# Patient Record
Sex: FEMALE | Race: WHITE | NOT HISPANIC OR LATINO
[De-identification: names, ages, dates, MRNs, and addresses within clinical notes are randomized per-mention and may not be internally consistent; named-entity substitution may affect disease eponyms.]

---

## 2017-01-31 ENCOUNTER — APPOINTMENT (OUTPATIENT)
Dept: DERMATOLOGY | Facility: CLINIC | Age: 82
End: 2017-01-31

## 2018-05-21 ENCOUNTER — EMERGENCY (EMERGENCY)
Facility: HOSPITAL | Age: 83
LOS: 1 days | Discharge: HOME | End: 2018-05-21
Attending: STUDENT IN AN ORGANIZED HEALTH CARE EDUCATION/TRAINING PROGRAM
Payer: MEDICARE

## 2018-05-21 VITALS
HEART RATE: 81 BPM | TEMPERATURE: 98 F | RESPIRATION RATE: 20 BRPM | DIASTOLIC BLOOD PRESSURE: 72 MMHG | HEIGHT: 66 IN | SYSTOLIC BLOOD PRESSURE: 110 MMHG | OXYGEN SATURATION: 97 % | WEIGHT: 156.97 LBS

## 2018-05-21 VITALS
OXYGEN SATURATION: 99 % | HEART RATE: 80 BPM | SYSTOLIC BLOOD PRESSURE: 112 MMHG | RESPIRATION RATE: 16 BRPM | DIASTOLIC BLOOD PRESSURE: 71 MMHG | TEMPERATURE: 98 F

## 2018-05-21 DIAGNOSIS — Z95.0 PRESENCE OF CARDIAC PACEMAKER: Chronic | ICD-10-CM

## 2018-05-21 LAB
ALBUMIN SERPL ELPH-MCNC: 3 G/DL — LOW (ref 3.3–5.2)
ALP SERPL-CCNC: 63 U/L — SIGNIFICANT CHANGE UP (ref 40–120)
ALT FLD-CCNC: 11 U/L — SIGNIFICANT CHANGE UP
ANION GAP SERPL CALC-SCNC: 14 MMOL/L — SIGNIFICANT CHANGE UP (ref 5–17)
AST SERPL-CCNC: 33 U/L — HIGH
BASOPHILS # BLD AUTO: 0 K/UL — SIGNIFICANT CHANGE UP (ref 0–0.2)
BASOPHILS NFR BLD AUTO: 0.3 % — SIGNIFICANT CHANGE UP (ref 0–2)
BILIRUB SERPL-MCNC: 0.6 MG/DL — SIGNIFICANT CHANGE UP (ref 0.4–2)
BUN SERPL-MCNC: 16 MG/DL — SIGNIFICANT CHANGE UP (ref 8–20)
CALCIUM SERPL-MCNC: 9.2 MG/DL — SIGNIFICANT CHANGE UP (ref 8.6–10.2)
CHLORIDE SERPL-SCNC: 100 MMOL/L — SIGNIFICANT CHANGE UP (ref 98–107)
CK SERPL-CCNC: 55 U/L — SIGNIFICANT CHANGE UP (ref 25–170)
CO2 SERPL-SCNC: 25 MMOL/L — SIGNIFICANT CHANGE UP (ref 22–29)
CREAT SERPL-MCNC: 0.74 MG/DL — SIGNIFICANT CHANGE UP (ref 0.5–1.3)
EOSINOPHIL # BLD AUTO: 0.1 K/UL — SIGNIFICANT CHANGE UP (ref 0–0.5)
EOSINOPHIL NFR BLD AUTO: 0.9 % — SIGNIFICANT CHANGE UP (ref 0–6)
GLUCOSE SERPL-MCNC: 89 MG/DL — SIGNIFICANT CHANGE UP (ref 70–115)
HCT VFR BLD CALC: 42.4 % — SIGNIFICANT CHANGE UP (ref 37–47)
HGB BLD-MCNC: 13.5 G/DL — SIGNIFICANT CHANGE UP (ref 12–16)
LYMPHOCYTES # BLD AUTO: 2.6 K/UL — SIGNIFICANT CHANGE UP (ref 1–4.8)
LYMPHOCYTES # BLD AUTO: 22.4 % — SIGNIFICANT CHANGE UP (ref 20–55)
MCHC RBC-ENTMCNC: 30.1 PG — SIGNIFICANT CHANGE UP (ref 27–31)
MCHC RBC-ENTMCNC: 31.8 G/DL — LOW (ref 32–36)
MCV RBC AUTO: 94.6 FL — SIGNIFICANT CHANGE UP (ref 81–99)
MONOCYTES # BLD AUTO: 1 K/UL — HIGH (ref 0–0.8)
MONOCYTES NFR BLD AUTO: 9.2 % — SIGNIFICANT CHANGE UP (ref 3–10)
NEUTROPHILS # BLD AUTO: 7.7 K/UL — SIGNIFICANT CHANGE UP (ref 1.8–8)
NEUTROPHILS NFR BLD AUTO: 67.1 % — SIGNIFICANT CHANGE UP (ref 37–73)
PLATELET # BLD AUTO: 193 K/UL — SIGNIFICANT CHANGE UP (ref 150–400)
POTASSIUM SERPL-MCNC: 5.4 MMOL/L — HIGH (ref 3.5–5.3)
POTASSIUM SERPL-SCNC: 5.4 MMOL/L — HIGH (ref 3.5–5.3)
PROT SERPL-MCNC: 6.9 G/DL — SIGNIFICANT CHANGE UP (ref 6.6–8.7)
RBC # BLD: 4.48 M/UL — SIGNIFICANT CHANGE UP (ref 4.4–5.2)
RBC # FLD: 14.2 % — SIGNIFICANT CHANGE UP (ref 11–15.6)
SODIUM SERPL-SCNC: 139 MMOL/L — SIGNIFICANT CHANGE UP (ref 135–145)
TROPONIN T SERPL-MCNC: <0.01 NG/ML — SIGNIFICANT CHANGE UP (ref 0–0.06)
WBC # BLD: 11.4 K/UL — HIGH (ref 4.8–10.8)
WBC # FLD AUTO: 11.4 K/UL — HIGH (ref 4.8–10.8)

## 2018-05-21 PROCEDURE — 84484 ASSAY OF TROPONIN QUANT: CPT

## 2018-05-21 PROCEDURE — 93010 ELECTROCARDIOGRAM REPORT: CPT

## 2018-05-21 PROCEDURE — 71046 X-RAY EXAM CHEST 2 VIEWS: CPT

## 2018-05-21 PROCEDURE — 85027 COMPLETE CBC AUTOMATED: CPT

## 2018-05-21 PROCEDURE — 71046 X-RAY EXAM CHEST 2 VIEWS: CPT | Mod: 26

## 2018-05-21 PROCEDURE — 93005 ELECTROCARDIOGRAM TRACING: CPT

## 2018-05-21 PROCEDURE — 36415 COLL VENOUS BLD VENIPUNCTURE: CPT

## 2018-05-21 PROCEDURE — 80053 COMPREHEN METABOLIC PANEL: CPT

## 2018-05-21 PROCEDURE — 82550 ASSAY OF CK (CPK): CPT

## 2018-05-21 PROCEDURE — 99284 EMERGENCY DEPT VISIT MOD MDM: CPT | Mod: 25

## 2018-05-21 PROCEDURE — 99285 EMERGENCY DEPT VISIT HI MDM: CPT

## 2018-05-21 RX ORDER — SODIUM CHLORIDE 9 MG/ML
3 INJECTION INTRAMUSCULAR; INTRAVENOUS; SUBCUTANEOUS ONCE
Qty: 0 | Refills: 0 | Status: COMPLETED | OUTPATIENT
Start: 2018-05-21 | End: 2018-05-21

## 2018-05-21 RX ORDER — LIDOCAINE 4 G/100G
10 CREAM TOPICAL ONCE
Qty: 0 | Refills: 0 | Status: COMPLETED | OUTPATIENT
Start: 2018-05-21 | End: 2018-05-21

## 2018-05-21 RX ADMIN — SODIUM CHLORIDE 3 MILLILITER(S): 9 INJECTION INTRAMUSCULAR; INTRAVENOUS; SUBCUTANEOUS at 20:32

## 2018-05-21 RX ADMIN — Medication 30 MILLILITER(S): at 20:32

## 2018-05-21 RX ADMIN — LIDOCAINE 10 MILLILITER(S): 4 CREAM TOPICAL at 20:32

## 2018-05-21 NOTE — ED PROVIDER NOTE - MEDICAL DECISION MAKING DETAILS
Pt with likely throat discomfort causing chest pain. Due to cardiac risk factors will evaluated for ACS.

## 2018-05-21 NOTE — ED ADULT TRIAGE NOTE - CHIEF COMPLAINT QUOTE
Patient brought in by ambulance A/Ox3, c/o midsternal chest pain that started last night, patient given 4 baby aspirin.

## 2018-05-21 NOTE — ED ADULT NURSE NOTE - OBJECTIVE STATEMENT
Assumed pt care at 1900.  PT awake, alert and oriented x3 c/o chest pain last night, resolved at this time.  Pt describes pain as "sitting in her throat" non radiating.  Denies shortness of breath.  Respirations even and unlabored.  Skin warm and dry, color good.  Moving all extremities well.  No edema.  No signs of acute distress at this time.  Patient placed on cardiac monitor, paced rhythm of 67.

## 2018-05-21 NOTE — ED PROVIDER NOTE - OBJECTIVE STATEMENT
83 y/o F pt with a pmhx of Crohn's disease, cardiac disease, incontinence, chronic back pain, high cholesterol, rheumatoid arthritis and pacemaker presents to the ED c/o chest pain that onset yesterday. Admits to associated chest congestion, difficulty breathing and producing  phlegm. Localizes the pain to the L upper sternal region. Describes a green phlegm that she is coughing up. Difficulty breathing and pain is exacerbated when she takes a deep breath. Denies radiation of pain, improving factors, difficulty speaking, sob, HA, fever, chills, cough, rash, recent travel or any other complaints. Allergic to sulfur drugs.   Cardiologist: Dr. Martinez  PMD: Dr. Stein 83 y/o F pt with a pmhx of Crohn's disease, cardiac disease, incontinence, chronic back pain, high cholesterol, rheumatoid arthritis and pacemaker presents to the ED c/o constant chest pain that onset yesterday. Admits to associated chest congestion, discomfort with deep breathing in her throat and sporadically producing phlegm. Localizes the pain just above the sternum. Describes a green phlegm that she is coughing up. Difficulty breathing and pain is exacerbated when she takes a deep breath. Denies radiation of pain, improving factors, difficulty speaking, sob, HA, fever, chills, cough, rash, recent travel or any other complaints. Allergic to sulfur drugs.   Cardiologist: Dr. Martinez  PMD: Dr. Stein

## 2018-05-21 NOTE — ED ADULT NURSE REASSESSMENT NOTE - NS ED NURSE REASSESS COMMENT FT1
Pt able to ambulate safely and steadily w/out assistance, denies dizziness/weakness upon standing, IV removed, pt d/c home w/ family, d/c by previous RN Lavern.

## 2018-06-16 ENCOUNTER — EMERGENCY (EMERGENCY)
Facility: HOSPITAL | Age: 83
LOS: 1 days | Discharge: HOME | End: 2018-06-16
Attending: EMERGENCY MEDICINE
Payer: MEDICARE

## 2018-06-16 VITALS
HEART RATE: 90 BPM | HEIGHT: 66 IN | WEIGHT: 175.05 LBS | OXYGEN SATURATION: 95 % | SYSTOLIC BLOOD PRESSURE: 138 MMHG | DIASTOLIC BLOOD PRESSURE: 88 MMHG | TEMPERATURE: 98 F | RESPIRATION RATE: 16 BRPM

## 2018-06-16 VITALS
HEART RATE: 86 BPM | TEMPERATURE: 98 F | SYSTOLIC BLOOD PRESSURE: 138 MMHG | DIASTOLIC BLOOD PRESSURE: 81 MMHG | OXYGEN SATURATION: 95 % | RESPIRATION RATE: 18 BRPM

## 2018-06-16 DIAGNOSIS — Z95.0 PRESENCE OF CARDIAC PACEMAKER: Chronic | ICD-10-CM

## 2018-06-16 LAB
ALBUMIN SERPL ELPH-MCNC: 2.9 G/DL — LOW (ref 3.3–5.2)
ALP SERPL-CCNC: 66 U/L — SIGNIFICANT CHANGE UP (ref 40–120)
ALT FLD-CCNC: 12 U/L — SIGNIFICANT CHANGE UP
ANION GAP SERPL CALC-SCNC: 14 MMOL/L — SIGNIFICANT CHANGE UP (ref 5–17)
APTT BLD: 28.2 SEC — SIGNIFICANT CHANGE UP (ref 27.5–37.4)
AST SERPL-CCNC: 31 U/L — SIGNIFICANT CHANGE UP
BILIRUB SERPL-MCNC: 0.5 MG/DL — SIGNIFICANT CHANGE UP (ref 0.4–2)
BUN SERPL-MCNC: 14 MG/DL — SIGNIFICANT CHANGE UP (ref 8–20)
CALCIUM SERPL-MCNC: 8.8 MG/DL — SIGNIFICANT CHANGE UP (ref 8.6–10.2)
CHLORIDE SERPL-SCNC: 100 MMOL/L — SIGNIFICANT CHANGE UP (ref 98–107)
CO2 SERPL-SCNC: 25 MMOL/L — SIGNIFICANT CHANGE UP (ref 22–29)
CREAT SERPL-MCNC: 0.59 MG/DL — SIGNIFICANT CHANGE UP (ref 0.5–1.3)
GLUCOSE SERPL-MCNC: 105 MG/DL — SIGNIFICANT CHANGE UP (ref 70–115)
HCT VFR BLD CALC: 41 % — SIGNIFICANT CHANGE UP (ref 37–47)
HGB BLD-MCNC: 12.8 G/DL — SIGNIFICANT CHANGE UP (ref 12–16)
INR BLD: 1.12 RATIO — SIGNIFICANT CHANGE UP (ref 0.88–1.16)
MCHC RBC-ENTMCNC: 29.8 PG — SIGNIFICANT CHANGE UP (ref 27–31)
MCHC RBC-ENTMCNC: 31.2 G/DL — LOW (ref 32–36)
MCV RBC AUTO: 95.3 FL — SIGNIFICANT CHANGE UP (ref 81–99)
PLATELET # BLD AUTO: 252 K/UL — SIGNIFICANT CHANGE UP (ref 150–400)
POTASSIUM SERPL-MCNC: 4.6 MMOL/L — SIGNIFICANT CHANGE UP (ref 3.5–5.3)
POTASSIUM SERPL-SCNC: 4.6 MMOL/L — SIGNIFICANT CHANGE UP (ref 3.5–5.3)
PROT SERPL-MCNC: 7.1 G/DL — SIGNIFICANT CHANGE UP (ref 6.6–8.7)
PROTHROM AB SERPL-ACNC: 12.3 SEC — SIGNIFICANT CHANGE UP (ref 9.8–12.7)
RBC # BLD: 4.3 M/UL — LOW (ref 4.4–5.2)
RBC # FLD: 14.1 % — SIGNIFICANT CHANGE UP (ref 11–15.6)
SODIUM SERPL-SCNC: 139 MMOL/L — SIGNIFICANT CHANGE UP (ref 135–145)
TROPONIN T SERPL-MCNC: <0.01 NG/ML — SIGNIFICANT CHANGE UP (ref 0–0.06)
WBC # BLD: 12.6 K/UL — HIGH (ref 4.8–10.8)
WBC # FLD AUTO: 12.6 K/UL — HIGH (ref 4.8–10.8)

## 2018-06-16 PROCEDURE — 71250 CT THORAX DX C-: CPT | Mod: 26

## 2018-06-16 PROCEDURE — 71045 X-RAY EXAM CHEST 1 VIEW: CPT

## 2018-06-16 PROCEDURE — 80053 COMPREHEN METABOLIC PANEL: CPT

## 2018-06-16 PROCEDURE — 36415 COLL VENOUS BLD VENIPUNCTURE: CPT

## 2018-06-16 PROCEDURE — 85730 THROMBOPLASTIN TIME PARTIAL: CPT

## 2018-06-16 PROCEDURE — 84484 ASSAY OF TROPONIN QUANT: CPT

## 2018-06-16 PROCEDURE — 96374 THER/PROPH/DIAG INJ IV PUSH: CPT

## 2018-06-16 PROCEDURE — 85610 PROTHROMBIN TIME: CPT

## 2018-06-16 PROCEDURE — 99284 EMERGENCY DEPT VISIT MOD MDM: CPT | Mod: 25

## 2018-06-16 PROCEDURE — 93005 ELECTROCARDIOGRAM TRACING: CPT

## 2018-06-16 PROCEDURE — 71045 X-RAY EXAM CHEST 1 VIEW: CPT | Mod: 26

## 2018-06-16 PROCEDURE — 99285 EMERGENCY DEPT VISIT HI MDM: CPT

## 2018-06-16 PROCEDURE — 85027 COMPLETE CBC AUTOMATED: CPT

## 2018-06-16 PROCEDURE — 93010 ELECTROCARDIOGRAM REPORT: CPT

## 2018-06-16 PROCEDURE — 72125 CT NECK SPINE W/O DYE: CPT

## 2018-06-16 PROCEDURE — 71250 CT THORAX DX C-: CPT

## 2018-06-16 PROCEDURE — 72125 CT NECK SPINE W/O DYE: CPT | Mod: 26

## 2018-06-16 RX ORDER — KETOROLAC TROMETHAMINE 30 MG/ML
15 SYRINGE (ML) INJECTION ONCE
Qty: 0 | Refills: 0 | Status: DISCONTINUED | OUTPATIENT
Start: 2018-06-16 | End: 2018-06-16

## 2018-06-16 RX ORDER — LIDOCAINE 4 G/100G
1 CREAM TOPICAL ONCE
Qty: 0 | Refills: 0 | Status: COMPLETED | OUTPATIENT
Start: 2018-06-16 | End: 2018-06-16

## 2018-06-16 RX ADMIN — Medication 15 MILLIGRAM(S): at 11:50

## 2018-06-16 RX ADMIN — Medication 15 MILLIGRAM(S): at 11:04

## 2018-06-16 NOTE — ED PROVIDER NOTE - MUSCULOSKELETAL, MLM
Spine appears normal, range of motion is not limited, tenderness to right chest wall, reproducible to palpation, no redness or signs of infection

## 2018-06-16 NOTE — ED PROVIDER NOTE - PROGRESS NOTE DETAILS
Pt states that she is feeling better.  CXR reviewed, based upon pt's clinical signs and sx less likely to be PNA, no fever, no white count, no cough Pt declines the Lidoderm patch Pt declined the Lidoderm patch Case discussed with radiologist. CT of chest showed subluxation of C4 on C5.  Radiologist recommending CT of neck.  Pt with no radiculopathy or neurological sx.  CT scan of chest as noted. Pt is resting comfortably.  Attempting to reach family  at the bedside. Labs and CT scans result discussed with both of them. Pt. will follow up with her PMD this monday.

## 2018-06-16 NOTE — ED PROVIDER NOTE - MEDICAL DECISION MAKING DETAILS
81 y/o with right chest pain, appears to be muscular, reproducible to palpation, will obtain labs, CXR, and reeval

## 2018-06-16 NOTE — ED PROVIDER NOTE - OBJECTIVE STATEMENT
81 y/o F, with hx of CAD, Chron' s disease, HLD, rheumatoid arteritis, with a pacemaker in place, presents to the ED c/o right sided chest pain, onset 3 days ago.  Pt states pain is constant and located beneath her right breast.  Pain is worse with movement, deep breathing, and touching.  Denies recent trauma or falls.  Pt states that she self medicated with Percocet at 6:30AM, with no relief.  Upon arrival EMS gave pt 4x 81mg Aspirin.  Pt states aspirin provided mild relief.  Denies similar sx in the past.  Denies calf pain, cough, SOB, back pain, N/V/D, fever, or chills.  Pt notes that for the past month she has been in and out of the hospital, for recent mental health issues.  PCP: Dr. Trav Stein

## 2018-06-16 NOTE — ED ADULT NURSE NOTE - OBJECTIVE STATEMENT
Patient states that she has been having mid sternal chest pain intermittently for the last 10 days. Patient states that this morning the pain was severe. Patient states that she took Oxycodone and the pain subsided. Patient denies any SOB at this time. NAD noted.

## 2018-06-16 NOTE — ED ADULT TRIAGE NOTE - CHIEF COMPLAINT QUOTE
C/O 10/10 CP x days, worse with inspiration. States took an oxycodone this morning and pain improved. Denies SOB, fevers, or chills.

## 2018-06-16 NOTE — ED PROVIDER NOTE - NS ED SCRIBE STATEMENT
Attending

## 2018-06-16 NOTE — ED ADULT NURSE NOTE - CHPI ED SYMPTOMS NEG
no cough/no back pain/no syncope/no dizziness/no diaphoresis/no chills/no fever/no nausea/no vomiting/no shortness of breath

## 2018-06-16 NOTE — ED PROVIDER NOTE - PMH
Cardiac disease  pacemaker 2007  Crohn disease    Depression    High cholesterol    Rheumatoid arteritis

## 2018-06-17 ENCOUNTER — INPATIENT (INPATIENT)
Facility: HOSPITAL | Age: 83
LOS: 8 days | Discharge: NURSING, REHAB OR LONG-TERM CARE | DRG: 546 | End: 2018-06-26
Attending: FAMILY MEDICINE | Admitting: HOSPITALIST
Payer: MEDICARE

## 2018-06-17 VITALS
WEIGHT: 151.9 LBS | DIASTOLIC BLOOD PRESSURE: 77 MMHG | RESPIRATION RATE: 18 BRPM | OXYGEN SATURATION: 92 % | SYSTOLIC BLOOD PRESSURE: 136 MMHG | HEART RATE: 86 BPM | HEIGHT: 66 IN | TEMPERATURE: 99 F

## 2018-06-17 DIAGNOSIS — Z95.0 PRESENCE OF CARDIAC PACEMAKER: Chronic | ICD-10-CM

## 2018-06-17 PROCEDURE — 99285 EMERGENCY DEPT VISIT HI MDM: CPT | Mod: 25

## 2018-06-17 RX ORDER — OXYCODONE AND ACETAMINOPHEN 5; 325 MG/1; MG/1
1 TABLET ORAL ONCE
Qty: 0 | Refills: 0 | Status: DISCONTINUED | OUTPATIENT
Start: 2018-06-17 | End: 2018-06-17

## 2018-06-17 NOTE — ED PROVIDER NOTE - OBJECTIVE STATEMENT
83 y/o F pt with hx of pacemaker, Crohn's disease, cardiac disease, depression, high cholesterol, rheumatoid arthritis presents to ED c/o b/l lateral chest wall pain. She states the pain started on right and now crosses across breast to other side. Pt was in ED yesterday for same symptoms. She states she was fine when she left last night but woke up in severe pain and it was constant all day. Pain is worse with movement and palpation. She took acetaminophen for pain relief. She was given Tramadol yesterday in ED with minimal relief. Denies fever, chills. Pt suffers from urinary incontinence.   Dr. Gupta

## 2018-06-17 NOTE — ED PROVIDER NOTE - CONSTITUTIONAL, MLM
normal... Well appearing, well nourished, awake, alert, oriented to person, place, time/situation and severe painful distress

## 2018-06-17 NOTE — ED PROVIDER NOTE - PROGRESS NOTE DETAILS
Recent ED visit noted. Patient with improved but not resolved pain. Patient will be admitted for further management. Will hold antibiotics pending pleural aspiration later today. Patient stable. Will continue to observe and treat pain.

## 2018-06-17 NOTE — ED PROVIDER NOTE - MEDICAL DECISION MAKING DETAILS
Will evaluate for cause of pt's pain by reviewing recent visit  and determine if intervention is needed for further pain

## 2018-06-17 NOTE — ED ADULT NURSE NOTE - CHPI ED SYMPTOMS NEG
no dizziness/no cough/no fever/no shortness of breath/no back pain/no vomiting/no chills/no diaphoresis/no syncope/no nausea

## 2018-06-17 NOTE — ED ADULT TRIAGE NOTE - CHIEF COMPLAINT QUOTE
Patient A&Ox4, complaining of pain to right flank area. Patient was in ED yesterday for same complaints, arrives with hospital gown on.

## 2018-06-18 DIAGNOSIS — K50.919 CROHN'S DISEASE, UNSPECIFIED, WITH UNSPECIFIED COMPLICATIONS: ICD-10-CM

## 2018-06-18 DIAGNOSIS — D72.829 ELEVATED WHITE BLOOD CELL COUNT, UNSPECIFIED: ICD-10-CM

## 2018-06-18 DIAGNOSIS — F32.89 OTHER SPECIFIED DEPRESSIVE EPISODES: ICD-10-CM

## 2018-06-18 DIAGNOSIS — Z29.9 ENCOUNTER FOR PROPHYLACTIC MEASURES, UNSPECIFIED: ICD-10-CM

## 2018-06-18 DIAGNOSIS — R07.9 CHEST PAIN, UNSPECIFIED: ICD-10-CM

## 2018-06-18 DIAGNOSIS — J90 PLEURAL EFFUSION, NOT ELSEWHERE CLASSIFIED: ICD-10-CM

## 2018-06-18 DIAGNOSIS — R07.82 INTERCOSTAL PAIN: ICD-10-CM

## 2018-06-18 DIAGNOSIS — R07.89 OTHER CHEST PAIN: ICD-10-CM

## 2018-06-18 LAB
ALBUMIN SERPL ELPH-MCNC: 3.2 G/DL — LOW (ref 3.3–5.2)
ALP SERPL-CCNC: 80 U/L — SIGNIFICANT CHANGE UP (ref 40–120)
ALT FLD-CCNC: 12 U/L — SIGNIFICANT CHANGE UP
ANION GAP SERPL CALC-SCNC: 15 MMOL/L — SIGNIFICANT CHANGE UP (ref 5–17)
AST SERPL-CCNC: 26 U/L — SIGNIFICANT CHANGE UP
BASOPHILS # BLD AUTO: 0 K/UL — SIGNIFICANT CHANGE UP (ref 0–0.2)
BASOPHILS NFR BLD AUTO: 0.1 % — SIGNIFICANT CHANGE UP (ref 0–2)
BILIRUB SERPL-MCNC: 0.5 MG/DL — SIGNIFICANT CHANGE UP (ref 0.4–2)
BUN SERPL-MCNC: 15 MG/DL — SIGNIFICANT CHANGE UP (ref 8–20)
CALCIUM SERPL-MCNC: 9 MG/DL — SIGNIFICANT CHANGE UP (ref 8.6–10.2)
CHLORIDE SERPL-SCNC: 99 MMOL/L — SIGNIFICANT CHANGE UP (ref 98–107)
CK SERPL-CCNC: 47 U/L — SIGNIFICANT CHANGE UP (ref 25–170)
CO2 SERPL-SCNC: 25 MMOL/L — SIGNIFICANT CHANGE UP (ref 22–29)
CREAT SERPL-MCNC: 0.61 MG/DL — SIGNIFICANT CHANGE UP (ref 0.5–1.3)
EOSINOPHIL # BLD AUTO: 0.1 K/UL — SIGNIFICANT CHANGE UP (ref 0–0.5)
EOSINOPHIL NFR BLD AUTO: 0.8 % — SIGNIFICANT CHANGE UP (ref 0–6)
GLUCOSE SERPL-MCNC: 116 MG/DL — HIGH (ref 70–115)
HCT VFR BLD CALC: 43.3 % — SIGNIFICANT CHANGE UP (ref 37–47)
HGB BLD-MCNC: 13.9 G/DL — SIGNIFICANT CHANGE UP (ref 12–16)
INR BLD: 1.23 RATIO — HIGH (ref 0.88–1.16)
LDH SERPL L TO P-CCNC: 218 U/L — HIGH (ref 98–192)
LIDOCAIN IGE QN: 10 U/L — LOW (ref 22–51)
LYMPHOCYTES # BLD AUTO: 1.3 K/UL — SIGNIFICANT CHANGE UP (ref 1–4.8)
LYMPHOCYTES # BLD AUTO: 8.8 % — LOW (ref 20–55)
MCHC RBC-ENTMCNC: 30.7 PG — SIGNIFICANT CHANGE UP (ref 27–31)
MCHC RBC-ENTMCNC: 32.1 G/DL — SIGNIFICANT CHANGE UP (ref 32–36)
MCV RBC AUTO: 95.6 FL — SIGNIFICANT CHANGE UP (ref 81–99)
MONOCYTES # BLD AUTO: 0.8 K/UL — SIGNIFICANT CHANGE UP (ref 0–0.8)
MONOCYTES NFR BLD AUTO: 5.6 % — SIGNIFICANT CHANGE UP (ref 3–10)
NEUTROPHILS # BLD AUTO: 12.3 K/UL — HIGH (ref 1.8–8)
NEUTROPHILS NFR BLD AUTO: 84.5 % — HIGH (ref 37–73)
PLATELET # BLD AUTO: 294 K/UL — SIGNIFICANT CHANGE UP (ref 150–400)
POTASSIUM SERPL-MCNC: 4.3 MMOL/L — SIGNIFICANT CHANGE UP (ref 3.5–5.3)
POTASSIUM SERPL-SCNC: 4.3 MMOL/L — SIGNIFICANT CHANGE UP (ref 3.5–5.3)
PROCALCITONIN SERPL-MCNC: 0.11 NG/ML — HIGH (ref 0–0.04)
PROT SERPL-MCNC: 7.3 G/DL — SIGNIFICANT CHANGE UP (ref 6.6–8.7)
PROTHROM AB SERPL-ACNC: 13.6 SEC — HIGH (ref 9.8–12.7)
RBC # BLD: 4.53 M/UL — SIGNIFICANT CHANGE UP (ref 4.4–5.2)
RBC # FLD: 14 % — SIGNIFICANT CHANGE UP (ref 11–15.6)
SODIUM SERPL-SCNC: 139 MMOL/L — SIGNIFICANT CHANGE UP (ref 135–145)
TROPONIN T SERPL-MCNC: <0.01 NG/ML — SIGNIFICANT CHANGE UP (ref 0–0.06)
WBC # BLD: 14.5 K/UL — HIGH (ref 4.8–10.8)
WBC # FLD AUTO: 14.5 K/UL — HIGH (ref 4.8–10.8)

## 2018-06-18 PROCEDURE — 71275 CT ANGIOGRAPHY CHEST: CPT | Mod: 26

## 2018-06-18 PROCEDURE — 12345: CPT | Mod: NC,GC

## 2018-06-18 PROCEDURE — 99223 1ST HOSP IP/OBS HIGH 75: CPT

## 2018-06-18 PROCEDURE — 93010 ELECTROCARDIOGRAM REPORT: CPT

## 2018-06-18 PROCEDURE — 71046 X-RAY EXAM CHEST 2 VIEWS: CPT | Mod: 26

## 2018-06-18 PROCEDURE — 99222 1ST HOSP IP/OBS MODERATE 55: CPT

## 2018-06-18 RX ORDER — OXYBUTYNIN CHLORIDE 5 MG
5 TABLET ORAL
Qty: 0 | Refills: 0 | Status: DISCONTINUED | OUTPATIENT
Start: 2018-06-18 | End: 2018-06-26

## 2018-06-18 RX ORDER — HYDROMORPHONE HYDROCHLORIDE 2 MG/ML
0.25 INJECTION INTRAMUSCULAR; INTRAVENOUS; SUBCUTANEOUS ONCE
Qty: 0 | Refills: 0 | Status: DISCONTINUED | OUTPATIENT
Start: 2018-06-18 | End: 2018-06-18

## 2018-06-18 RX ORDER — PANTOPRAZOLE SODIUM 20 MG/1
40 TABLET, DELAYED RELEASE ORAL
Qty: 0 | Refills: 0 | Status: DISCONTINUED | OUTPATIENT
Start: 2018-06-18 | End: 2018-06-26

## 2018-06-18 RX ORDER — DULOXETINE HYDROCHLORIDE 30 MG/1
20 CAPSULE, DELAYED RELEASE ORAL DAILY
Qty: 0 | Refills: 0 | Status: DISCONTINUED | OUTPATIENT
Start: 2018-06-18 | End: 2018-06-26

## 2018-06-18 RX ORDER — ZOLPIDEM TARTRATE 10 MG/1
5 TABLET ORAL AT BEDTIME
Qty: 0 | Refills: 0 | Status: DISCONTINUED | OUTPATIENT
Start: 2018-06-18 | End: 2018-06-24

## 2018-06-18 RX ORDER — HYDROMORPHONE HYDROCHLORIDE 2 MG/ML
0.5 INJECTION INTRAMUSCULAR; INTRAVENOUS; SUBCUTANEOUS EVERY 4 HOURS
Qty: 0 | Refills: 0 | Status: DISCONTINUED | OUTPATIENT
Start: 2018-06-18 | End: 2018-06-20

## 2018-06-18 RX ORDER — OXYCODONE HYDROCHLORIDE 5 MG/1
5 TABLET ORAL EVERY 4 HOURS
Qty: 0 | Refills: 0 | Status: DISCONTINUED | OUTPATIENT
Start: 2018-06-18 | End: 2018-06-20

## 2018-06-18 RX ORDER — MESALAMINE 400 MG
400 TABLET, DELAYED RELEASE (ENTERIC COATED) ORAL THREE TIMES A DAY
Qty: 0 | Refills: 0 | Status: DISCONTINUED | OUTPATIENT
Start: 2018-06-18 | End: 2018-06-26

## 2018-06-18 RX ORDER — ONDANSETRON 8 MG/1
4 TABLET, FILM COATED ORAL EVERY 4 HOURS
Qty: 0 | Refills: 0 | Status: DISCONTINUED | OUTPATIENT
Start: 2018-06-18 | End: 2018-06-26

## 2018-06-18 RX ORDER — KETOROLAC TROMETHAMINE 30 MG/ML
30 SYRINGE (ML) INJECTION ONCE
Qty: 0 | Refills: 0 | Status: DISCONTINUED | OUTPATIENT
Start: 2018-06-18 | End: 2018-06-18

## 2018-06-18 RX ORDER — OXYCODONE HYDROCHLORIDE 5 MG/1
10 TABLET ORAL EVERY 4 HOURS
Qty: 0 | Refills: 0 | Status: DISCONTINUED | OUTPATIENT
Start: 2018-06-18 | End: 2018-06-25

## 2018-06-18 RX ADMIN — PANTOPRAZOLE SODIUM 40 MILLIGRAM(S): 20 TABLET, DELAYED RELEASE ORAL at 06:36

## 2018-06-18 RX ADMIN — HYDROMORPHONE HYDROCHLORIDE 0.5 MILLIGRAM(S): 2 INJECTION INTRAMUSCULAR; INTRAVENOUS; SUBCUTANEOUS at 17:39

## 2018-06-18 RX ADMIN — DULOXETINE HYDROCHLORIDE 20 MILLIGRAM(S): 30 CAPSULE, DELAYED RELEASE ORAL at 13:00

## 2018-06-18 RX ADMIN — Medication 30 MILLIGRAM(S): at 02:54

## 2018-06-18 RX ADMIN — OXYCODONE AND ACETAMINOPHEN 1 TABLET(S): 5; 325 TABLET ORAL at 01:00

## 2018-06-18 RX ADMIN — Medication 400 MILLIGRAM(S): at 13:00

## 2018-06-18 RX ADMIN — OXYCODONE AND ACETAMINOPHEN 1 TABLET(S): 5; 325 TABLET ORAL at 00:04

## 2018-06-18 RX ADMIN — OXYCODONE HYDROCHLORIDE 10 MILLIGRAM(S): 5 TABLET ORAL at 21:20

## 2018-06-18 RX ADMIN — OXYCODONE HYDROCHLORIDE 10 MILLIGRAM(S): 5 TABLET ORAL at 17:39

## 2018-06-18 RX ADMIN — HYDROMORPHONE HYDROCHLORIDE 0.5 MILLIGRAM(S): 2 INJECTION INTRAMUSCULAR; INTRAVENOUS; SUBCUTANEOUS at 15:35

## 2018-06-18 RX ADMIN — OXYCODONE HYDROCHLORIDE 10 MILLIGRAM(S): 5 TABLET ORAL at 14:38

## 2018-06-18 RX ADMIN — Medication 400 MILLIGRAM(S): at 21:29

## 2018-06-18 RX ADMIN — Medication 5 MILLIGRAM(S): at 17:14

## 2018-06-18 RX ADMIN — Medication 30 MILLIGRAM(S): at 03:00

## 2018-06-18 RX ADMIN — Medication 5 MILLIGRAM(S): at 06:36

## 2018-06-18 RX ADMIN — OXYCODONE HYDROCHLORIDE 10 MILLIGRAM(S): 5 TABLET ORAL at 20:22

## 2018-06-18 RX ADMIN — HYDROMORPHONE HYDROCHLORIDE 0.25 MILLIGRAM(S): 2 INJECTION INTRAMUSCULAR; INTRAVENOUS; SUBCUTANEOUS at 05:25

## 2018-06-18 NOTE — H&P ADULT - NSHPPHYSICALEXAM_GEN_ALL_CORE
General appearance: NAD, Awake, Alert  HEENT: NCAT, Conjunctiva clear, EOMI, Pupils reactive  Neck: Supple, No JVD, No tenderness  Lungs:  Decreased Breath sounds B/L R > L  Cardiovascular: S1S2, Regular rhythm  Abdomen: Soft, Nontender, Nondistended, No guarding/rebound, Positive bowel sounds  Extremities: No clubbing, No cyanosis, No edema, No calf tenderness  Neuro: Strength equal bilaterally, No tremors  Skin: No new Rash  Psychiatric: Appropriate mood, Normal affect

## 2018-06-18 NOTE — PROGRESS NOTE ADULT - SUBJECTIVE AND OBJECTIVE BOX
CC: Rt sided chest Pain (18 Jun 2018 06:06)    HPI: 83 y/o Female with PMHx of Crohn's disease, Depression presented with severe Right sided chest wall pain - pt was evaluated in ER day prior for same pain - pleuritic in nature, + tenderness on exam and with movements - found to have moderate right pleural effusion with atelectasis on CT chest. Pt minimally mobile as per her as she does not want to.    INTERVAL HPI/OVERNIGHT EVENTS: decreased pain now, + depression, "will consider thoracocentesis in 24h"  Other ROS reviewed and neg     Vital Signs Last 24 Hrs  T(C): 36.7 (18 Jun 2018 09:51), Max: 37 (17 Jun 2018 23:06)  T(F): 98 (18 Jun 2018 09:51), Max: 98.6 (17 Jun 2018 23:06)  HR: 60 (18 Jun 2018 09:51) (60 - 95)  BP: 98/53 (18 Jun 2018 09:51) (98/53 - 145/74)  RR: 18 (18 Jun 2018 09:51) (18 - 20)  SpO2: 96% (18 Jun 2018 09:51) (92% - 96%)    CARDIAC MARKERS ( 18 Jun 2018 01:14 )  x     / <0.01 ng/mL / 47 U/L / x     / x                            13.9   14.5  )-----------( 294      ( 18 Jun 2018 01:14 )             43.3     18 Jun 2018 01:14    139    |  99     |  15.0   ----------------------------<  116    4.3     |  25.0   |  0.61     Ca    9.0        18 Jun 2018 01:14    TPro  7.3    /  Alb  3.2    /  TBili  0.5    /  DBili  x      /  AST  26     /  ALT  12     /  AlkPhos  80     18 Jun 2018 01:14    PT/INR - ( 18 Jun 2018 01:14 )   PT: 13.6 sec;   INR: 1.23 ratio      LIVER FUNCTIONS - ( 18 Jun 2018 01:14 )  Alb: 3.2 g/dL / Pro: 7.3 g/dL / ALK PHOS: 80 U/L / ALT: 12 U/L / AST: 26 U/L / GGT: x           MEDICATIONS  (STANDING):  DULoxetine 20 milliGRAM(s) Oral daily  mesalamine DR Capsule 400 milliGRAM(s) Oral three times a day  oxybutynin 5 milliGRAM(s) Oral two times a day  pantoprazole    Tablet 40 milliGRAM(s) Oral before breakfast    MEDICATIONS  (PRN):  HYDROmorphone  Injectable 0.5 milliGRAM(s) IV Push every 4 hours PRN Severe Pain (7 - 10)  ondansetron Injectable 4 milliGRAM(s) IV Push every 4 hours PRN Nausea and/or Vomiting  oxyCODONE    IR 5 milliGRAM(s) Oral every 4 hours PRN Mild Pain (1 - 3)  oxyCODONE    IR 10 milliGRAM(s) Oral every 4 hours PRN Moderate Pain (4 - 6)  zolpidem 5 milliGRAM(s) Oral at bedtime PRN Insomnia    RADIOLOGY & ADDITIONAL TESTS: perosnally visualized    PHYSICAL EXAM:    General: elderly female in no acute distress  Eyes: PERRLA, EOMI; conjunctiva and sclera clear  Head: Normocephalic; atraumatic  ENMT: No nasal discharge; airway clear  Neck: Supple; non tender; no masses  Respiratory: decreased BS Bibasilarly R>L with bibasilar crackles, + tenderness on palpation of right chest  Cardiovascular: Regular rate and rhythm. S1 and S2  Gastrointestinal: Soft non-tender non-distended; Normal bowel sounds  Genitourinary: No costovertebral angle tenderness  Extremities: Normal range of motion, No clubbing, cyanosis or edema  Vascular: Peripheral pulses palpable 2+ bilaterally  Neurological: Alert and oriented x4  Skin: Warm and dry.   Musculoskeletal: Normal gait, tone, without deformities  Psychiatric: Cooperative

## 2018-06-18 NOTE — CONSULT NOTE ADULT - ASSESSMENT
82 year old female with pmhx noted above a/w right sided chest pain and SOB found to have bilateral pleural effusion R>L.

## 2018-06-18 NOTE — PROGRESS NOTE ADULT - PROBLEM SELECTOR PLAN 2
-  Repeat CTA chest today pending  - ER already called CT surgery to evaluate for Possible empyema  - Depending on CTA chest results, consider diagnostic/therapeutic Pleural tap  - saturating well on room air  - Hold off on Abx, as Pt is afebrile and Normotensive - likely reactive from pain

## 2018-06-18 NOTE — H&P ADULT - PROBLEM SELECTOR PLAN 4
- continue Home Cymbalta - continue Home medication. We don't have Apriso in Hospital, and so started pentasa while she is in Hospital.  - currently not in flare

## 2018-06-18 NOTE — H&P ADULT - PROBLEM SELECTOR PROBLEM 3
Crohn's disease with complication, unspecified gastrointestinal tract location Leukocytosis, unspecified type

## 2018-06-18 NOTE — H&P ADULT - PROBLEM SELECTOR PLAN 3
- continue Home medication pentasa  - currently not in flare - likely reactive from pain  - wait for CTA chest results  - Hold off on Abx, as Pt afebrile and stable  - check serum Procalcitonin levels  - repeat CBC In am

## 2018-06-18 NOTE — H&P ADULT - PROBLEM SELECTOR PLAN 2
-  Repeat CTA chest today pending  - ER already called CT surgery to evaluate for Possible empyema  - Depending on CTA chest results, consider diagnostic/therapeutic Pleural tap  - saturating well on room air  - Hold off on Abx, as Pt is afebrile and Normotensive

## 2018-06-18 NOTE — PROGRESS NOTE ADULT - PROBLEM SELECTOR PLAN 3
- likely reactive from pain  - wait for CTA chest results  - Hold off on Abx, as Pt afebrile and stable  - check serum Procalcitonin levels  - repeat CBC In am no exacerbation, continue pentasa

## 2018-06-18 NOTE — PROGRESS NOTE ADULT - PROBLEM SELECTOR PLAN 4
- continue Home medication. We don't have Apriso in Hospital, and so started pentasa while she is in Hospital.  - currently not in flare continue Cymbalta

## 2018-06-18 NOTE — H&P ADULT - NSHPSOCIALHISTORY_GEN_ALL_CORE
Pt is  and Lives at Home with her   Denies smoking cigarettes  Denies alcohol  Denies using recreational drugs

## 2018-06-18 NOTE — PROGRESS NOTE ADULT - ASSESSMENT
Ms Johnson, she is a very pleasant 83 y/o Female with the Past medical H/O Crohn's disease, depression, presented to  to ED c/o Severe Rt sided  chest wall pain. She states that she was in ED yesterday for same symptoms.  She had CT chest with out contrast, which showed Moderate Rt sided Pleural effusion. She states she was fine when she left last night but woke up in severe pain and it was constant all day. Pain is worse with movement and palpation. She took Tylenol for pain relief. She was given Tramadol yesterday in ED with minimal relief. Denies fever, chills.  Today workup in ER showed Increasing WBC counts 14.5. After giving Toradol, IV Dilaudid and Oxycodone IR, her Pain was relieved.   Because her pain was not controlled outpatient and with increasing wbc counts, Hospitalist consulted for admission 83 y/o Female with PMHx of Crohn's disease, Depression presented with severe Right sided chest wall pain - pt was evaluated in ER day prior for same pain - pleuritic in nature, + tenderness on exam and with movements - found to have moderate right pleural effusion with atelectasis on CT chest. Pt minimally mobile as per her as she does not want to.

## 2018-06-18 NOTE — PROGRESS NOTE ADULT - PROBLEM SELECTOR PLAN 1
- Rt sided Chest wall pain  - CT chest from yesterday showing Moderate Pl effusion  - Repeat CTA chest today pending  - ER already called CT surgery to evaluate for Possible empyema  - Depending on CTA chest results, consider diagnostic/therapeutic Pleural tap  - saturating well on room air  - Hold off on Abx, as Pt is afebrile and Normotensive on right, pleuritic etiology with no fever, negative procalcitonin and mildly elevated WBC - moderate pleural effusion on CT noted - as per CTS - IR for diagnostic and therapeutic thoracocentesis, no abx for now, f/u BCx

## 2018-06-18 NOTE — PROGRESS NOTE ADULT - PROBLEM SELECTOR PROBLEM 3
Leukocytosis, unspecified type Crohn's disease with complication, unspecified gastrointestinal tract location

## 2018-06-18 NOTE — CONSULT NOTE ADULT - SUBJECTIVE AND OBJECTIVE BOX
HISTORY OF PRESENT ILLNESS:  82y Female with a pmhx of depression, cardiac disease s/p PPM, HLD, RA, crohn disease who presents and is being admitted with 9 days of right sided chest pain. Patient was seen in ED day prior with same presentation. States pain with palpation and movement. Pt found to have moderate right pleural effusion with atelectasis on CT chest.    PAST MEDICAL & SURGICAL HISTORY:  Depression  Cardiac disease: pacemaker 2007  High cholesterol  Rheumatoid arteritis  Crohn disease  Pacemaker    SOCIAL HISTORY:  Smoker: [ ] Yes  [ ] No        PACK YEARS:                         WHEN QUIT?  ETOH use: [ ] Yes  [ ] No              FREQUENCY / QUANTITY:  Ilicit Drug use:  [ ] Yes  [ ] No  Occupation:  Live with:  Assisted device use:    FAMILY HISTORY:  No pertinent family history in first degree relatives    MEDICATIONS  (STANDING):  DULoxetine 20 milliGRAM(s) Oral daily  mesalamine DR Capsule 400 milliGRAM(s) Oral three times a day  oxybutynin 5 milliGRAM(s) Oral two times a day  pantoprazole    Tablet 40 milliGRAM(s) Oral before breakfast    MEDICATIONS  (PRN):  HYDROmorphone  Injectable 0.5 milliGRAM(s) IV Push every 4 hours PRN Severe Pain (7 - 10)  ondansetron Injectable 4 milliGRAM(s) IV Push every 4 hours PRN Nausea and/or Vomiting  oxyCODONE    IR 5 milliGRAM(s) Oral every 4 hours PRN Mild Pain (1 - 3)  oxyCODONE    IR 10 milliGRAM(s) Oral every 4 hours PRN Moderate Pain (4 - 6)  zolpidem 5 milliGRAM(s) Oral at bedtime PRN Insomnia      Allergies  sulfa drugs (Anaphylaxis)      Review of Systems  CONSTITUTIONAL:  Fevers[ ] chills[ ] sweats[ ] fatigue[ ] weight loss[ ] weight gain [ ]                                     NEGATIVE [x]   NEURO:  parathesias[ ] seizures [ ]  syncope [ ]  confusion [ ]                                                                                NEGATIVE[x]   EYES: glasses[ ]  blurry vision[ ]  discharge[ ] pain[ ] glaucoma [ ]                                                                          NEGATIVE[x]   CV:  chest pain[x] palpitations[ ] DAVIS [ ] diaphoresis [ ]                                                                                           NEGATIVE[ ]   RESPIRATORY:  wheezing[ ] SOB[x] cough [x] sputum[ ] hemoptysis[ ]                                                                  NEGATIVE[ ]   GI:  nausea[ ]  vommiting [ ]  diarrhea[ ] constipation [ ] melena [ ]                                                                      NEGATIVE[x]   : hematuria[ ]  dysuria[ ] urgency[ ] incontinence[ ]                                                                                            NEGATIVE[x]   MUSKULOSKELETAL:  arthritis[ ]  joint swelling [ ] muscle weakness [ ]                                                                NEGATIVE[x]   SKIN/BREAST:  rash[ ] itching [ ]  hair loss[ ] masses[ ]                                                                                              NEGATIVE[x]       PHYSICAL EXAM  Vital Signs Last 24 Hrs  T(C): 36.7 (18 Jun 2018 09:51), Max: 37 (17 Jun 2018 23:06)  T(F): 98 (18 Jun 2018 09:51), Max: 98.6 (17 Jun 2018 23:06)  HR: 60 (18 Jun 2018 09:51) (60 - 95)  BP: 98/53 (18 Jun 2018 09:51) (98/53 - 145/74)  RR: 18 (18 Jun 2018 09:51) (18 - 20)  SpO2: 96% (18 Jun 2018 09:51) (92% - 96%) on RA    CONSTITUTIONAL:                                                                          WNL[x]   Neuro: WNL[x] Normal exam oriented to person/place & time with no focal motor or sensory  deficits. Other __________                    Eyes: WNL[x]   Normal exam of conjunctiva & lids, pupils equally reactive. Other______________________________     Neck: WNL[x]  Normal exam of jugular veins, trachea & thyroid. Other_________________________________________  Chest: WNL[x] Normal lung exam with good air movement absence of wheezes, rales, or rhonchi: Other Diminished breath sounds R>L                                                                                CV:  Auscultation: normal [x] S3[ ] S4[ ] Irregular [ ] Rub[ ] Clicks[ ]    Murmurs none:[x]systolic [ ]  diastolic [ ] holosystolic [ ]                                                                                  GI: WNL[x] Normal exam of abdomen, liver & spleen with no noted masses or tenderness. Other______________________                                                                                                        Extremities: WNL[x] Normal no evidence of cyanosis or deformity Edema: none[ ]trace[ ]1+[ ]2+[ ]3+[ ]4+[ ]  Lower Extremity Pulses: Right[1+] Left[1+]Varicosities[ ]  SKIN :WNL[x] Normal exam to inspection & palation. Other:____________________                                                          LABS:                        13.9   14.5  )-----------( 294      ( 18 Jun 2018 01:14 )             43.3     06-18    139  |  99  |  15.0  ----------------------------<  116<H>  4.3   |  25.0  |  0.61    Ca    9.0      18 Jun 2018 01:14    TPro  7.3  /  Alb  3.2<L>  /  TBili  0.5  /  DBili  x   /  AST  26  /  ALT  12  /  AlkPhos  80  06-18    PT/INR - ( 18 Jun 2018 01:14 )   PT: 13.6 sec;   INR: 1.23 ratio       CARDIAC MARKERS ( 18 Jun 2018 01:14 )  x     / <0.01 ng/mL / 47 U/L / x     / x          CXR:  < from: Xray Chest 2 Views PA/Lat (06.18.18 @ 00:50) >  Comparisons:  Chest x-ray dated 6/16/2018    Findings:     Airspace disease right lower lobe, atelectasis and right   pleural effusion. Lungs otherwise clear. No change in pacemaker wire   positions in right atrium and ventricle.  .    The pulmonary vasculature   and aorta are normal for age. Heart size is unremarkable.     The thorax is normal for age.    Impression: Pacemaker. Airspace disease right lower lobe and right   pleural effusion suggesting pneumonia.          CT Chest:  < from: CT Angio Chest w/ IV Cont (06.18.18 @ 06:09) >  TECHNIQUE:    Axial computed tomographic angiography images of the chest with   intravenous   contrast using CT angiography protocol. Patient received 72 cc   intravenous Omnipaque 350 with no immediate adverse reaction.   Coronal and sagittal reformatted images were created and reviewed.    MIP reconstructed images were created and reviewed.     COMPARISON:    CT CHEST 2018-06-16 12:25     FINDINGS:     Limited due to respiratory motion artifact     Pulmonary arteries: No definite pulmonary emboli.    Aorta: No definite aortic aneurysm. No aortic dissection.    Lungs: Mild subsegmental atelectasis. Question mild interstitial edema   No   masses.    Pleural space:  Small right and trace left pleural effusions No   pneumothorax.    Heart: Moderate cardiomegaly. Cardiac pacemaker leads noted No   pericardial   effusion.    Bones/joints: Unremarkable. No acute fracture.    Soft tissues: Unremarkable.    Lymph nodes: Unremarkable. No enlarged lymph nodes.     IMPRESSION:     No definite pulmonary emboli    Moderate cardiomegaly and question mild edema. Right greater than left   pleural   effusions.    Subsegmental atelectasis right lower lobe    Filmsand report from [facility] were reviewed and approved.    CARLOS ALBERTO PARK M.D., ATTENDING RADIOLOGIST  This document has been electronically signed. Jun 18 2018  9:05AM        EKG  < from: 12 Lead ECG (06.16.18 @ 08:46) >  Ventricular Rate 85 BPM  Atrial Rate 80 BPM  QRS Duration 186 ms  Q-T Interval 434 ms  QTC Calculation(Bezet) 516 ms  R Axis -71 degrees  T Axis 96 degrees  Diagnosis Line *** Poor data quality, interpretation may be adversely affected  Ventricular-paced rhythm  Abnormal ECG HISTORY OF PRESENT ILLNESS:  82y Female with a pmhx of depression, cardiac disease s/p PPM, HLD, RA, crohn disease who presents and is being admitted with 9 days of right sided chest pain. Patient was seen in ED day prior with same presentation. States pain with palpation and movement. Pt found to have moderate right pleural effusion with atelectasis on CT chest.    PAST MEDICAL & SURGICAL HISTORY:  Depression  Cardiac disease: pacemaker 2007  High cholesterol  Rheumatoid arteritis  Crohn disease  Pacemaker    SOCIAL HISTORY:  Smoker: [ ] Yes  [x] No        PACK YEARS:                         WHEN QUIT?  ETOH use: [ ] Yes  [x] No              FREQUENCY / QUANTITY:  Ilicit Drug use:  [ ] Yes  [x] No    FAMILY HISTORY:  No pertinent family history in first degree relatives    MEDICATIONS  (STANDING):  DULoxetine 20 milliGRAM(s) Oral daily  mesalamine DR Capsule 400 milliGRAM(s) Oral three times a day  oxybutynin 5 milliGRAM(s) Oral two times a day  pantoprazole    Tablet 40 milliGRAM(s) Oral before breakfast    MEDICATIONS  (PRN):  HYDROmorphone  Injectable 0.5 milliGRAM(s) IV Push every 4 hours PRN Severe Pain (7 - 10)  ondansetron Injectable 4 milliGRAM(s) IV Push every 4 hours PRN Nausea and/or Vomiting  oxyCODONE    IR 5 milliGRAM(s) Oral every 4 hours PRN Mild Pain (1 - 3)  oxyCODONE    IR 10 milliGRAM(s) Oral every 4 hours PRN Moderate Pain (4 - 6)  zolpidem 5 milliGRAM(s) Oral at bedtime PRN Insomnia      Allergies  sulfa drugs (Anaphylaxis)      Review of Systems  CONSTITUTIONAL:  Fevers[ ] chills[ ] sweats[ ] fatigue[ ] weight loss[ ] weight gain [ ]                                     NEGATIVE [x]   NEURO:  parathesias[ ] seizures [ ]  syncope [ ]  confusion [ ]                                                                                NEGATIVE[x]   EYES: glasses[ ]  blurry vision[ ]  discharge[ ] pain[ ] glaucoma [ ]                                                                          NEGATIVE[x]   CV:  chest pain[x] palpitations[ ] DAVIS [ ] diaphoresis [ ]                                                                                           NEGATIVE[ ]   RESPIRATORY:  wheezing[ ] SOB[x] cough [x] sputum[ ] hemoptysis[ ]                                                                  NEGATIVE[ ]   GI:  nausea[ ]  vommiting [ ]  diarrhea[ ] constipation [ ] melena [ ]                                                                      NEGATIVE[x]   : hematuria[ ]  dysuria[ ] urgency[ ] incontinence[ ]                                                                                            NEGATIVE[x]   MUSKULOSKELETAL:  arthritis[ ]  joint swelling [ ] muscle weakness [ ]                                                                NEGATIVE[x]   SKIN/BREAST:  rash[ ] itching [ ]  hair loss[ ] masses[ ]                                                                                              NEGATIVE[x]       PHYSICAL EXAM  Vital Signs Last 24 Hrs  T(C): 36.7 (18 Jun 2018 09:51), Max: 37 (17 Jun 2018 23:06)  T(F): 98 (18 Jun 2018 09:51), Max: 98.6 (17 Jun 2018 23:06)  HR: 60 (18 Jun 2018 09:51) (60 - 95)  BP: 98/53 (18 Jun 2018 09:51) (98/53 - 145/74)  RR: 18 (18 Jun 2018 09:51) (18 - 20)  SpO2: 96% (18 Jun 2018 09:51) (92% - 96%) on RA    CONSTITUTIONAL:                                                                          WNL[x]   Neuro: WNL[x] Normal exam oriented to person/place & time with no focal motor or sensory  deficits. Other __________                    Eyes: WNL[x]   Normal exam of conjunctiva & lids, pupils equally reactive. Other______________________________     Neck: WNL[x]  Normal exam of jugular veins, trachea & thyroid. Other_________________________________________  Chest: WNL[x] Normal lung exam with good air movement absence of wheezes, rales, or rhonchi: Other Diminished breath sounds R>L                                                                                CV:  Auscultation: normal [x] S3[ ] S4[ ] Irregular [ ] Rub[ ] Clicks[ ]    Murmurs none:[x]systolic [ ]  diastolic [ ] holosystolic [ ]                                                                                  GI: WNL[x] Normal exam of abdomen, liver & spleen with no noted masses or tenderness. Other______________________                                                                                                        Extremities: WNL[x] Normal no evidence of cyanosis or deformity Edema: none[ ]trace[ ]1+[ ]2+[ ]3+[ ]4+[ ]  Lower Extremity Pulses: Right[1+] Left[1+]Varicosities[ ]  SKIN :WNL[x] Normal exam to inspection & palation. Other:____________________                                                          LABS:                        13.9   14.5  )-----------( 294      ( 18 Jun 2018 01:14 )             43.3     06-18    139  |  99  |  15.0  ----------------------------<  116<H>  4.3   |  25.0  |  0.61    Ca    9.0      18 Jun 2018 01:14    TPro  7.3  /  Alb  3.2<L>  /  TBili  0.5  /  DBili  x   /  AST  26  /  ALT  12  /  AlkPhos  80  06-18    PT/INR - ( 18 Jun 2018 01:14 )   PT: 13.6 sec;   INR: 1.23 ratio       CARDIAC MARKERS ( 18 Jun 2018 01:14 )  x     / <0.01 ng/mL / 47 U/L / x     / x          CXR:  < from: Xray Chest 2 Views PA/Lat (06.18.18 @ 00:50) >  Comparisons:  Chest x-ray dated 6/16/2018    Findings:     Airspace disease right lower lobe, atelectasis and right   pleural effusion. Lungs otherwise clear. No change in pacemaker wire   positions in right atrium and ventricle.  .    The pulmonary vasculature   and aorta are normal for age. Heart size is unremarkable.     The thorax is normal for age.    Impression: Pacemaker. Airspace disease right lower lobe and right   pleural effusion suggesting pneumonia.        CT Chest:  < from: CT Angio Chest w/ IV Cont (06.18.18 @ 06:09) >  TECHNIQUE:    Axial computed tomographic angiography images of the chest with   intravenous   contrast using CT angiography protocol. Patient received 72 cc   intravenous Omnipaque 350 with no immediate adverse reaction.   Coronal and sagittal reformatted images were created and reviewed.    MIP reconstructed images were created and reviewed.     COMPARISON:    CT CHEST 2018-06-16 12:25     FINDINGS:     Limited due to respiratory motion artifact     Pulmonary arteries: No definite pulmonary emboli.    Aorta: No definite aortic aneurysm. No aortic dissection.    Lungs: Mild subsegmental atelectasis. Question mild interstitial edema   No   masses.    Pleural space:  Small right and trace left pleural effusions No   pneumothorax.    Heart: Moderate cardiomegaly. Cardiac pacemaker leads noted No   pericardial   effusion.    Bones/joints: Unremarkable. No acute fracture.    Soft tissues: Unremarkable.    Lymph nodes: Unremarkable. No enlarged lymph nodes.     IMPRESSION:     No definite pulmonary emboli    Moderate cardiomegaly and question mild edema. Right greater than left   pleural   effusions.    Subsegmental atelectasis right lower lobe    Filmsand report from [facility] were reviewed and approved.    CARLOS ALBERTO PARK M.D., ATTENDING RADIOLOGIST  This document has been electronically signed. Jun 18 2018  9:05AM        EKG  < from: 12 Lead ECG (06.16.18 @ 08:46) >  Ventricular Rate 85 BPM  Atrial Rate 80 BPM  QRS Duration 186 ms  Q-T Interval 434 ms  QTC Calculation(Bezet) 516 ms  R Axis -71 degrees  T Axis 96 degrees  Diagnosis Line *** Poor data quality, interpretation may be adversely affected  Ventricular-paced rhythm  Abnormal ECG

## 2018-06-18 NOTE — CONSULT NOTE ADULT - PROBLEM SELECTOR RECOMMENDATION 9
Continue care as per admitting team. Thoracic surgery recommending IR thoracentesis for drainage of right sided effusion. Plan discussed with both Dr. Quintero and Dr. Dick. Thoracic surgery will continue to follow patient.

## 2018-06-18 NOTE — H&P ADULT - PROBLEM SELECTOR PLAN 1
- Rt sided Chest wall pain  - CT chest from yesterday showing Moderate Pl effusion  - Repeat CTA chest today pending  - ER already called CT surgery to evaluate for Possible empyema  - Depending on CTA chest results, consider diagnostic/therapeutic Pleural tap  - saturating well on room air  - Hold off on Abx, as Pt is afebrile and Normotensive

## 2018-06-18 NOTE — H&P ADULT - ASSESSMENT
Ms Johnson, she is a very pleasant 81 y/o Female with the Past medical H/O Crohn's disease, depression, presented to  to ED c/o Severe Rt sided  chest wall pain. She states that she was in ED yesterday for same symptoms.  She had CT chest with out contrast, which showed Moderate Rt sided Pleural effusion. She states she was fine when she left last night but woke up in severe pain and it was constant all day. Pain is worse with movement and palpation. She took Tylenol for pain relief. She was given Tramadol yesterday in ED with minimal relief. Denies fever, chills.  Today workup in ER showed Increasing WBC counts 14.5. After giving Toradol, IV Dilaudid and Oxycodone IR, her Pain was relieved.   Because her pain was not controlled outpatient and with increasing wbc counts, Hospitalist consulted for admission

## 2018-06-19 ENCOUNTER — RESULT REVIEW (OUTPATIENT)
Age: 83
End: 2018-06-19

## 2018-06-19 LAB
-  STREPTOCOCCUS SP. (NOT GRP A, B OR S PNEUMONIAE): SIGNIFICANT CHANGE UP
ALBUMIN FLD-MCNC: 1.8 G/DL — SIGNIFICANT CHANGE UP
B PERT IGG+IGM PNL SER: ABNORMAL
COLOR FLD: ABNORMAL
EOSINOPHIL # FLD: 4 % — SIGNIFICANT CHANGE UP
FLUID INTAKE SUBSTANCE CLASS: SIGNIFICANT CHANGE UP
FLUID SEGMENTED GRANULOCYTES: 84 % — SIGNIFICANT CHANGE UP
GLUCOSE FLD-MCNC: 79 MG/DL — SIGNIFICANT CHANGE UP
GRAM STN FLD: SIGNIFICANT CHANGE UP
HCT VFR BLD CALC: 39.4 % — SIGNIFICANT CHANGE UP (ref 37–47)
HGB BLD-MCNC: 12.2 G/DL — SIGNIFICANT CHANGE UP (ref 12–16)
LDH SERPL L TO P-CCNC: 220 U/L — HIGH (ref 98–192)
LDH SERPL L TO P-CCNC: 974 U/L — SIGNIFICANT CHANGE UP
LYMPHOCYTES # FLD: 7 % — SIGNIFICANT CHANGE UP
MCHC RBC-ENTMCNC: 29.5 PG — SIGNIFICANT CHANGE UP (ref 27–31)
MCHC RBC-ENTMCNC: 31 G/DL — LOW (ref 32–36)
MCV RBC AUTO: 95.2 FL — SIGNIFICANT CHANGE UP (ref 81–99)
METHOD TYPE: SIGNIFICANT CHANGE UP
MONOS+MACROS # FLD: 5 % — SIGNIFICANT CHANGE UP
PH FLD: 8.5 — SIGNIFICANT CHANGE UP
PLATELET # BLD AUTO: 266 K/UL — SIGNIFICANT CHANGE UP (ref 150–400)
PROT FLD-MCNC: 3.5 G/DL — SIGNIFICANT CHANGE UP
PROT SERPL-MCNC: 6.7 G/DL — SIGNIFICANT CHANGE UP (ref 6.6–8.7)
RBC # BLD: 4.14 M/UL — LOW (ref 4.4–5.2)
RBC # FLD: 14.2 % — SIGNIFICANT CHANGE UP (ref 11–15.6)
RCV VOL RI: 2050 /UL — HIGH (ref 0–5)
SPECIMEN SOURCE: SIGNIFICANT CHANGE UP
TOTAL NUCLEATED CELL COUNT, BODY FLUID: 3056 /UL — HIGH (ref 0–5)
TUBE TYPE: SIGNIFICANT CHANGE UP
WBC # BLD: 11.7 K/UL — HIGH (ref 4.8–10.8)
WBC # FLD AUTO: 11.7 K/UL — HIGH (ref 4.8–10.8)

## 2018-06-19 PROCEDURE — 99223 1ST HOSP IP/OBS HIGH 75: CPT

## 2018-06-19 PROCEDURE — 88342 IMHCHEM/IMCYTCHM 1ST ANTB: CPT | Mod: 26

## 2018-06-19 PROCEDURE — 71045 X-RAY EXAM CHEST 1 VIEW: CPT | Mod: 26

## 2018-06-19 PROCEDURE — 99233 SBSQ HOSP IP/OBS HIGH 50: CPT | Mod: GC

## 2018-06-19 PROCEDURE — 88341 IMHCHEM/IMCYTCHM EA ADD ANTB: CPT | Mod: 26

## 2018-06-19 PROCEDURE — 88305 TISSUE EXAM BY PATHOLOGIST: CPT | Mod: 26

## 2018-06-19 PROCEDURE — 88112 CYTOPATH CELL ENHANCE TECH: CPT | Mod: 26

## 2018-06-19 PROCEDURE — 32555 ASPIRATE PLEURA W/ IMAGING: CPT | Mod: RT

## 2018-06-19 RX ORDER — CEFTRIAXONE 500 MG/1
2 INJECTION, POWDER, FOR SOLUTION INTRAMUSCULAR; INTRAVENOUS EVERY 24 HOURS
Qty: 0 | Refills: 0 | Status: DISCONTINUED | OUTPATIENT
Start: 2018-06-20 | End: 2018-06-26

## 2018-06-19 RX ORDER — CEFTRIAXONE 500 MG/1
INJECTION, POWDER, FOR SOLUTION INTRAMUSCULAR; INTRAVENOUS
Qty: 0 | Refills: 0 | Status: DISCONTINUED | OUTPATIENT
Start: 2018-06-19 | End: 2018-06-26

## 2018-06-19 RX ORDER — CEFTRIAXONE 500 MG/1
2 INJECTION, POWDER, FOR SOLUTION INTRAMUSCULAR; INTRAVENOUS ONCE
Qty: 0 | Refills: 0 | Status: COMPLETED | OUTPATIENT
Start: 2018-06-19 | End: 2018-06-19

## 2018-06-19 RX ORDER — HYDROMORPHONE HYDROCHLORIDE 2 MG/ML
0.5 INJECTION INTRAMUSCULAR; INTRAVENOUS; SUBCUTANEOUS ONCE
Qty: 0 | Refills: 0 | Status: DISCONTINUED | OUTPATIENT
Start: 2018-06-19 | End: 2018-06-19

## 2018-06-19 RX ORDER — AZITHROMYCIN 500 MG/1
500 TABLET, FILM COATED ORAL DAILY
Qty: 0 | Refills: 0 | Status: DISCONTINUED | OUTPATIENT
Start: 2018-06-19 | End: 2018-06-26

## 2018-06-19 RX ADMIN — OXYCODONE HYDROCHLORIDE 10 MILLIGRAM(S): 5 TABLET ORAL at 04:56

## 2018-06-19 RX ADMIN — CEFTRIAXONE 100 GRAM(S): 500 INJECTION, POWDER, FOR SOLUTION INTRAMUSCULAR; INTRAVENOUS at 16:36

## 2018-06-19 RX ADMIN — Medication 5 MILLIGRAM(S): at 18:00

## 2018-06-19 RX ADMIN — AZITHROMYCIN 500 MILLIGRAM(S): 500 TABLET, FILM COATED ORAL at 17:59

## 2018-06-19 RX ADMIN — Medication 400 MILLIGRAM(S): at 05:06

## 2018-06-19 RX ADMIN — PANTOPRAZOLE SODIUM 40 MILLIGRAM(S): 20 TABLET, DELAYED RELEASE ORAL at 05:06

## 2018-06-19 RX ADMIN — OXYCODONE HYDROCHLORIDE 10 MILLIGRAM(S): 5 TABLET ORAL at 23:01

## 2018-06-19 RX ADMIN — OXYCODONE HYDROCHLORIDE 10 MILLIGRAM(S): 5 TABLET ORAL at 22:22

## 2018-06-19 RX ADMIN — Medication 400 MILLIGRAM(S): at 22:22

## 2018-06-19 RX ADMIN — Medication 400 MILLIGRAM(S): at 13:34

## 2018-06-19 RX ADMIN — Medication 5 MILLIGRAM(S): at 05:06

## 2018-06-19 RX ADMIN — OXYCODONE HYDROCHLORIDE 10 MILLIGRAM(S): 5 TABLET ORAL at 05:50

## 2018-06-19 RX ADMIN — HYDROMORPHONE HYDROCHLORIDE 0.5 MILLIGRAM(S): 2 INJECTION INTRAMUSCULAR; INTRAVENOUS; SUBCUTANEOUS at 11:52

## 2018-06-19 RX ADMIN — DULOXETINE HYDROCHLORIDE 20 MILLIGRAM(S): 30 CAPSULE, DELAYED RELEASE ORAL at 18:00

## 2018-06-19 NOTE — PROGRESS NOTE ADULT - ASSESSMENT
81 y/o Female with PMHx of Crohn's disease, Depression, Cardiac Dx s/p PPM presented with severe Right sided chest wall pain - pt was evaluated in ER day prior for same pain - pleuritic in nature, + tenderness on exam and with movements - found to have moderate right pleural effusion with atelectasis on CT chest. Pt minimally mobile as per her as she does not want to as she extremely depressed: "I want it all to be over". Underwent thoracocentesis on 6/19 with 500cc of clear yellow fluid under local anesthesia.

## 2018-06-19 NOTE — BRIEF OPERATIVE NOTE - PROCEDURE
<<-----Click on this checkbox to enter Procedure Thoracentesis of right pleural cavity with ultrasound guidance with insertion of chest tube  06/19/2018    Active  FABIAN

## 2018-06-19 NOTE — PROGRESS NOTE ADULT - SUBJECTIVE AND OBJECTIVE BOX
CC: Rt sided chest Pain (18 Jun 2018 06:06)    HPI: 81 y/o Female with PMHx of Crohn's disease, Depression presented with severe Right sided chest wall pain - pt was evaluated in ER day prior for same pain - pleuritic in nature, + tenderness on exam and with movements - found to have moderate right pleural effusion with atelectasis on CT chest. Pt minimally mobile as per her as she does not want to.    INTERVAL HPI/OVERNIGHT EVENTS: pain controlled, + severe depression, agreed for thoracocentesis after second attempt and encouragement  Other ROS reviewed and neg     Vital Signs Last 24 Hrs  T(C): 37 (19 Jun 2018 12:43), Max: 37 (19 Jun 2018 12:43)  T(F): 98.6 (19 Jun 2018 12:43), Max: 98.6 (19 Jun 2018 12:43)  HR: 78 (19 Jun 2018 12:43) (52 - 93)  BP: 112/68 (19 Jun 2018 12:43) (105/65 - 137/80)  RR: 18 (19 Jun 2018 12:43) (18 - 18)  SpO2: 98% (19 Jun 2018 12:43) (92% - 99%)    CARDIAC MARKERS ( 18 Jun 2018 01:14 )  x     / <0.01 ng/mL / 47 U/L / x     / x                          12.2   11.7  )-----------( 266      ( 19 Jun 2018 08:08 )             39.4     18 Jun 2018 01:14    139    |  99     |  15.0   ----------------------------<  116    4.3     |  25.0   |  0.61     Ca    9.0        18 Jun 2018 01:14    TPro  7.3    /  Alb  3.2    /  TBili  0.5    /  DBili  x      /  AST  26     /  ALT  12     /  AlkPhos  80     18 Jun 2018 01:14    PT/INR - ( 18 Jun 2018 01:14 )   PT: 13.6 sec;   INR: 1.23 ratio      LIVER FUNCTIONS - ( 18 Jun 2018 01:14 )  Alb: 3.2 g/dL / Pro: 7.3 g/dL / ALK PHOS: 80 U/L / ALT: 12 U/L / AST: 26 U/L / GGT: x           MEDICATIONS  (STANDING):  DULoxetine 20 milliGRAM(s) Oral daily  mesalamine DR Capsule 400 milliGRAM(s) Oral three times a day  oxybutynin 5 milliGRAM(s) Oral two times a day  pantoprazole    Tablet 40 milliGRAM(s) Oral before breakfast    MEDICATIONS  (PRN):  HYDROmorphone  Injectable 0.5 milliGRAM(s) IV Push every 4 hours PRN Severe Pain (7 - 10)  ondansetron Injectable 4 milliGRAM(s) IV Push every 4 hours PRN Nausea and/or Vomiting  oxyCODONE    IR 5 milliGRAM(s) Oral every 4 hours PRN Mild Pain (1 - 3)  oxyCODONE    IR 10 milliGRAM(s) Oral every 4 hours PRN Moderate Pain (4 - 6)  zolpidem 5 milliGRAM(s) Oral at bedtime PRN Insomnia    RADIOLOGY & ADDITIONAL TESTS: perosnally visualized    PHYSICAL EXAM:    General: elderly female in no acute distress  Eyes: PERRLA, EOMI; conjunctiva and sclera clear  Head: Normocephalic; atraumatic  ENMT: No nasal discharge; airway clear  Neck: Supple; non tender; no masses  Respiratory: decreased BS Bibasilarly R>L with bibasilar crackles, + tenderness on palpation of right chest  Cardiovascular: Regular rate and rhythm. S1 and S2  Gastrointestinal: Soft non-tender non-distended; Normal bowel sounds  Genitourinary: No costovertebral angle tenderness  Extremities: Normal range of motion, No clubbing, cyanosis or edema  Vascular: Peripheral pulses palpable 2+ bilaterally  Neurological: Alert and oriented x4  Skin: Warm and dry.   Musculoskeletal: Normal gait, tone, without deformities  Psychiatric: depressed

## 2018-06-19 NOTE — PROGRESS NOTE ADULT - PROBLEM SELECTOR PLAN 1
on right, pleuritic etiology with no fever, negative procalcitonin and mildly elevated WBC - moderate pleural effusion on CT noted - as per CTS - IR for diagnostic and therapeutic thoracocentesis done 6/19 with 500 cc of clear yellow fluid drained, no abx for now, f/u BCx positive - will have ID evaluation

## 2018-06-19 NOTE — CONSULT NOTE ADULT - SUBJECTIVE AND OBJECTIVE BOX
[facility]  INFECTIOUS DISEASES AND INTERNAL MEDICINE at [place]  =======================================================  Nathan Campbell MD  Diplomates American Board of Internal Medicine and Infectious Diseases  =======================================================    MRN-[id number]  KARSTEN HALE     CC: ID was called for positive blood culture    HPI:  83y/o woman with PMH of  major depression, RA, Crohn's disease and cardiac disease s/p pace maker was admitted on 6/16 with R sided pleuritic chest pain and was found to have a large pleural effusion.   On admission had mild leukocytosis around 14k and thoracostenosis of pleural fluid showed high WBC neutrophil predominant.   Today complaining of mild SOB but no chest pain or cough. no fever or chills at home. no URI at home.   No recent travel. Never been smoker.   Blood culture from admission grew 1/2 strep viridans.     PAST MEDICAL & SURGICAL HISTORY:  Depression  Cardiac disease: pacemaker 2007  High cholesterol  Rheumatoid arteritis  Crohn disease  Pacemaker    Social Hx: no smoking, or drinking habits    FAMILY HISTORY:  No pertinent family history in first degree relatives    Allergies  sulfa drugs (Anaphylaxis)    Antibiotics:  None     REVIEW OF SYSTEMS:  CONSTITUTIONAL:  No Fever or chills  HEENT:  No diplopia or blurred vision.  No earache, sore throat or runny nose.  CARDIOVASCULAR:  No chest pain or SOB.  RESPIRATORY:  No cough, +shortness of breath, no PND or orthopnea.  GASTROINTESTINAL:  No nausea, vomiting or diarrhea.  GENITOURINARY:  No dysuria, frequency or urgency. No Blood in urine  MUSCULOSKELETAL:  no joint aches, no muscle pain  SKIN:  No change in skin, hair or nails.  NEUROLOGIC:  No paresthesias, fasciculations, seizures or weakness.  PSYCHIATRIC:  depressed mood   ENDOCRINE:  No heat or cold intolerance, polyuria or polydipsia.  HEMATOLOGICAL:  No easy bruising or bleeding.     Physical Exam:  Vital Signs Last 24 Hrs  T(C): 37 (19 Jun 2018 12:43), Max: 37 (19 Jun 2018 12:43)  T(F): 98.6 (19 Jun 2018 12:43), Max: 98.6 (19 Jun 2018 12:43)  HR: 78 (19 Jun 2018 12:43) (52 - 93)  BP: 112/68 (19 Jun 2018 12:43) (105/65 - 137/80)  RR: 18 (19 Jun 2018 12:43) (18 - 18)  SpO2: 98% (19 Jun 2018 12:43) (92% - 99%)  GEN: NAD  HEENT: normocephalic and atraumatic. EOMI. PERRL.    NECK: Supple.  No lymphadenopathy   LUNGS: decreased BS in R lower half of lung.   HEART: Regular rate and rhythm without murmur.  ABDOMEN: Soft, nontender, and nondistended.  Positive bowel sounds.    : No CVA tenderness  EXTREMITIES: Without any cyanosis, clubbing, rash, lesions or edema.  NEUROLOGIC: grossly intact.  PSYCHIATRIC: depressed mood, doesn't talk much, eyes closed during interview   SKIN: No ulceration or induration present.    Labs:  06-18    139  |  99  |  15.0  ----------------------------<  116<H>  4.3   |  25.0  |  0.61    Ca    9.0      18 Jun 2018 01:14    TPro  7.3  /  Alb  3.2<L>  /  TBili  0.5  /  DBili  x   /  AST  26  /  ALT  12  /  AlkPhos  80  06-18                          12.2   11.7  )-----------( 266      ( 19 Jun 2018 08:08 )             39.4     PT/INR - ( 18 Jun 2018 01:14 )   PT: 13.6 sec;   INR: 1.23 ratio      LIVER FUNCTIONS - ( 18 Jun 2018 01:14 )  Alb: 3.2 g/dL / Pro: 7.3 g/dL / ALK PHOS: 80 U/L / ALT: 12 U/L / AST: 26 U/L / GGT: x           CARDIAC MARKERS ( 18 Jun 2018 01:14 )  x     / <0.01 ng/mL / 47 U/L / x     / x        RECENT CULTURES:  06-18 @ 07:01 .Blood Blood Blood Culture PCR    Growth in aerobic bottle: Gram Positive Cocci in Pairs and Chains  Aerobic Bottle: 22:54 Hours to positivity  Anaerobic Bottle: No growth to date    All imaging and other data have been reviewed.

## 2018-06-19 NOTE — CONSULT NOTE ADULT - ASSESSMENT
83y/o woman with PMH of major depression, RA, Crohn's disease and cardiac disease s/p pace maker was admitted on 6/16 with R sided pleuritic chest pain and was found to have a large pleural effusion.    Blood culture from admission grew 1/2 strep viridans. It could be contamination, if we had 2 sets of blood culture, it would be easier to decide between contamination and real infection.   At this point prefer to treat for bacteremia and possible pneumonia causing pleural effusion.   No fever but mild leukocytosis.     1-Bacteremia:  -Start ceftriaxine 2gm daily  -send blood culture today.   -TTE if negative BALTA    2-Pleural effusion:  -The effusion has high PMN it could be infectious, PH was high that goes against infection but no prot and LDH from fluid was sent(they are more helpful to differentiate between exudate and transudate based on Light's criteria).  -Possible undelying pneumonia  -Will be treated with ceftriaxone for 2 weeks for bacteremia and will add azithromycin 500mg daily for 5 days.   -Follow up pleural fluid culture.   -Will treat for possible CAP if no improvement in large pleural effusion then will need more work up.

## 2018-06-19 NOTE — CHART NOTE - NSCHARTNOTEFT_GEN_A_CORE
Pt seen and examined.  S/p thoracocentesis in IR to drain R pleural effusion.  Post op cxr stable, no ptx.  Pt reports sob improved.  D/w dr Quintero.  Cts will sign off, please reconsult if needed.

## 2018-06-20 DIAGNOSIS — R41.0 DISORIENTATION, UNSPECIFIED: ICD-10-CM

## 2018-06-20 LAB
ANION GAP SERPL CALC-SCNC: 10 MMOL/L — SIGNIFICANT CHANGE UP (ref 5–17)
BUN SERPL-MCNC: 20 MG/DL — SIGNIFICANT CHANGE UP (ref 8–20)
CALCIUM SERPL-MCNC: 9.1 MG/DL — SIGNIFICANT CHANGE UP (ref 8.6–10.2)
CHLORIDE SERPL-SCNC: 97 MMOL/L — LOW (ref 98–107)
CO2 SERPL-SCNC: 30 MMOL/L — HIGH (ref 22–29)
CREAT SERPL-MCNC: 0.68 MG/DL — SIGNIFICANT CHANGE UP (ref 0.5–1.3)
CRP SERPL-MCNC: 26.8 MG/DL — HIGH (ref 0–0.4)
ERYTHROCYTE [SEDIMENTATION RATE] IN BLOOD: 48 MM/HR — HIGH (ref 0–20)
GLUCOSE SERPL-MCNC: 89 MG/DL — SIGNIFICANT CHANGE UP (ref 70–115)
HCT VFR BLD CALC: 38.2 % — SIGNIFICANT CHANGE UP (ref 37–47)
HGB BLD-MCNC: 11.8 G/DL — LOW (ref 12–16)
MCHC RBC-ENTMCNC: 29.9 PG — SIGNIFICANT CHANGE UP (ref 27–31)
MCHC RBC-ENTMCNC: 30.9 G/DL — LOW (ref 32–36)
MCV RBC AUTO: 97 FL — SIGNIFICANT CHANGE UP (ref 81–99)
PLATELET # BLD AUTO: 297 K/UL — SIGNIFICANT CHANGE UP (ref 150–400)
POTASSIUM SERPL-MCNC: 4.9 MMOL/L — SIGNIFICANT CHANGE UP (ref 3.5–5.3)
POTASSIUM SERPL-SCNC: 4.9 MMOL/L — SIGNIFICANT CHANGE UP (ref 3.5–5.3)
RBC # BLD: 3.94 M/UL — LOW (ref 4.4–5.2)
RBC # FLD: 14 % — SIGNIFICANT CHANGE UP (ref 11–15.6)
SODIUM SERPL-SCNC: 137 MMOL/L — SIGNIFICANT CHANGE UP (ref 135–145)
WBC # BLD: 10.3 K/UL — SIGNIFICANT CHANGE UP (ref 4.8–10.8)
WBC # FLD AUTO: 10.3 K/UL — SIGNIFICANT CHANGE UP (ref 4.8–10.8)

## 2018-06-20 PROCEDURE — 99233 SBSQ HOSP IP/OBS HIGH 50: CPT | Mod: GC

## 2018-06-20 PROCEDURE — 99223 1ST HOSP IP/OBS HIGH 75: CPT

## 2018-06-20 PROCEDURE — 99232 SBSQ HOSP IP/OBS MODERATE 35: CPT

## 2018-06-20 RX ORDER — SACCHAROMYCES BOULARDII 250 MG
250 POWDER IN PACKET (EA) ORAL
Qty: 0 | Refills: 0 | Status: DISCONTINUED | OUTPATIENT
Start: 2018-06-20 | End: 2018-06-26

## 2018-06-20 RX ORDER — ENOXAPARIN SODIUM 100 MG/ML
40 INJECTION SUBCUTANEOUS EVERY 24 HOURS
Qty: 0 | Refills: 0 | Status: DISCONTINUED | OUTPATIENT
Start: 2018-06-20 | End: 2018-06-26

## 2018-06-20 RX ORDER — INDOMETHACIN 50 MG
25 CAPSULE ORAL
Qty: 0 | Refills: 0 | Status: DISCONTINUED | OUTPATIENT
Start: 2018-06-20 | End: 2018-06-26

## 2018-06-20 RX ADMIN — PANTOPRAZOLE SODIUM 40 MILLIGRAM(S): 20 TABLET, DELAYED RELEASE ORAL at 05:34

## 2018-06-20 RX ADMIN — Medication 250 MILLIGRAM(S): at 12:13

## 2018-06-20 RX ADMIN — OXYCODONE HYDROCHLORIDE 5 MILLIGRAM(S): 5 TABLET ORAL at 22:00

## 2018-06-20 RX ADMIN — ENOXAPARIN SODIUM 40 MILLIGRAM(S): 100 INJECTION SUBCUTANEOUS at 12:12

## 2018-06-20 RX ADMIN — Medication 25 MILLIGRAM(S): at 18:25

## 2018-06-20 RX ADMIN — Medication 400 MILLIGRAM(S): at 05:33

## 2018-06-20 RX ADMIN — Medication 25 MILLIGRAM(S): at 18:12

## 2018-06-20 RX ADMIN — OXYCODONE HYDROCHLORIDE 10 MILLIGRAM(S): 5 TABLET ORAL at 10:00

## 2018-06-20 RX ADMIN — HYDROMORPHONE HYDROCHLORIDE 0.5 MILLIGRAM(S): 2 INJECTION INTRAMUSCULAR; INTRAVENOUS; SUBCUTANEOUS at 12:13

## 2018-06-20 RX ADMIN — AZITHROMYCIN 500 MILLIGRAM(S): 500 TABLET, FILM COATED ORAL at 12:13

## 2018-06-20 RX ADMIN — Medication 5 MILLIGRAM(S): at 05:33

## 2018-06-20 RX ADMIN — CEFTRIAXONE 100 GRAM(S): 500 INJECTION, POWDER, FOR SOLUTION INTRAMUSCULAR; INTRAVENOUS at 14:44

## 2018-06-20 RX ADMIN — DULOXETINE HYDROCHLORIDE 20 MILLIGRAM(S): 30 CAPSULE, DELAYED RELEASE ORAL at 12:13

## 2018-06-20 RX ADMIN — HYDROMORPHONE HYDROCHLORIDE 0.5 MILLIGRAM(S): 2 INJECTION INTRAMUSCULAR; INTRAVENOUS; SUBCUTANEOUS at 12:54

## 2018-06-20 RX ADMIN — Medication 250 MILLIGRAM(S): at 18:12

## 2018-06-20 RX ADMIN — OXYCODONE HYDROCHLORIDE 10 MILLIGRAM(S): 5 TABLET ORAL at 08:33

## 2018-06-20 RX ADMIN — Medication 400 MILLIGRAM(S): at 14:44

## 2018-06-20 RX ADMIN — Medication 400 MILLIGRAM(S): at 21:30

## 2018-06-20 RX ADMIN — Medication 5 MILLIGRAM(S): at 18:12

## 2018-06-20 RX ADMIN — OXYCODONE HYDROCHLORIDE 5 MILLIGRAM(S): 5 TABLET ORAL at 21:30

## 2018-06-20 NOTE — PHYSICAL THERAPY INITIAL EVALUATION ADULT - CRITERIA FOR SKILLED THERAPEUTIC INTERVENTIONS
functional limitations in following categories/anticipated discharge recommendation/impairments found/therapy frequency/rehab potential/predicted duration of therapy intervention

## 2018-06-20 NOTE — PROGRESS NOTE ADULT - SUBJECTIVE AND OBJECTIVE BOX
CC: Rt sided chest Pain (18 Jun 2018 06:06)    HPI: 83 y/o Female with PMHx of Crohn's disease, Depression presented with severe Right sided chest wall pain - pt was evaluated in ER day prior for same pain - pleuritic in nature, + tenderness on exam and with movements - found to have moderate right pleural effusion with atelectasis on CT chest. Pt minimally mobile as per her as she does not want to.    INTERVAL HPI/OVERNIGHT EVENTS: pain controlled, + severe depression, agreed for thoracocentesis after second attempt and encouragement  Other ROS reviewed and neg     Vital Signs Last 24 Hrs  T(C): 37.1 (20 Jun 2018 08:23), Max: 37.2 (19 Jun 2018 22:25)  T(F): 98.7 (20 Jun 2018 08:23), Max: 99 (19 Jun 2018 22:25)  HR: 75 (20 Jun 2018 08:23) (64 - 87)  BP: 122/63 (20 Jun 2018 08:23) (105/65 - 122/63)  RR: 18 (20 Jun 2018 08:23) (16 - 18)  SpO2: 92% (20 Jun 2018 04:41) (92% - 99%)                        11.8   10.3  )-----------( 297      ( 20 Jun 2018 07:49 )             38.2     20 Jun 2018 07:49    137    |  97     |  20.0   ----------------------------<  89     4.9     |  30.0   |  0.68     Ca    9.1        20 Jun 2018 07:49    TPro  6.7    /  Alb  x      /  TBili  x      /  DBili  x      /  AST  x      /  ALT  x      /  AlkPhos  x      19 Jun 2018 17:05    LIVER FUNCTIONS - ( 19 Jun 2018 17:05 )  Alb: x     / Pro: 6.7 g/dL / ALK PHOS: x     / ALT: x     / AST: x     / GGT: x           MEDICATIONS  (STANDING):  DULoxetine 20 milliGRAM(s) Oral daily  mesalamine DR Capsule 400 milliGRAM(s) Oral three times a day  oxybutynin 5 milliGRAM(s) Oral two times a day  pantoprazole    Tablet 40 milliGRAM(s) Oral before breakfast    MEDICATIONS  (PRN):  HYDROmorphone  Injectable 0.5 milliGRAM(s) IV Push every 4 hours PRN Severe Pain (7 - 10)  ondansetron Injectable 4 milliGRAM(s) IV Push every 4 hours PRN Nausea and/or Vomiting  oxyCODONE    IR 5 milliGRAM(s) Oral every 4 hours PRN Mild Pain (1 - 3)  oxyCODONE    IR 10 milliGRAM(s) Oral every 4 hours PRN Moderate Pain (4 - 6)  zolpidem 5 milliGRAM(s) Oral at bedtime PRN Insomnia    RADIOLOGY & ADDITIONAL TESTS: perosnally visualized    PHYSICAL EXAM:    General: elderly female in no acute distress  Eyes: PERRLA, EOMI; conjunctiva and sclera clear  Head: Normocephalic; atraumatic  ENMT: No nasal discharge; airway clear  Neck: Supple; non tender; no masses  Respiratory: decreased BS Bibasilarly R>L with bibasilar crackles, + tenderness on palpation of right chest  Cardiovascular: Regular rate and rhythm. S1 and S2  Gastrointestinal: Soft non-tender non-distended; Normal bowel sounds  Genitourinary: No costovertebral angle tenderness  Extremities: Normal range of motion, No clubbing, cyanosis or edema  Vascular: Peripheral pulses palpable 2+ bilaterally  Neurological: Alert and oriented x4  Skin: Warm and dry.   Musculoskeletal: Normal gait, tone, without deformities  Psychiatric: depressed CC: Rt sided chest Pain (18 Jun 2018 06:06)    HPI: 83 y/o Female with PMHx of Crohn's disease, Depression presented with severe Right sided chest wall pain - pt was evaluated in ER day prior for same pain - pleuritic in nature, + tenderness on exam and with movements - found to have moderate right pleural effusion with atelectasis on CT chest. Pt minimally mobile as per her as she does not want to.    INTERVAL HPI/OVERNIGHT EVENTS: pain controlled, + severe depression, s/p thoracocentesis, forgetful, continue to c/o CP on and off, refusing indomethacin due to mucosal ulcerations in the past  Other ROS reviewed and neg     Vital Signs Last 24 Hrs  T(C): 37.1 (20 Jun 2018 08:23), Max: 37.2 (19 Jun 2018 22:25)  T(F): 98.7 (20 Jun 2018 08:23), Max: 99 (19 Jun 2018 22:25)  HR: 75 (20 Jun 2018 08:23) (64 - 87)  BP: 122/63 (20 Jun 2018 08:23) (105/65 - 122/63)  RR: 18 (20 Jun 2018 08:23) (16 - 18)  SpO2: 92% (20 Jun 2018 04:41) (92% - 99%)                        11.8   10.3  )-----------( 297      ( 20 Jun 2018 07:49 )             38.2     20 Jun 2018 07:49    137    |  97     |  20.0   ----------------------------<  89     4.9     |  30.0   |  0.68     Ca    9.1        20 Jun 2018 07:49    TPro  6.7    /  Alb  x      /  TBili  x      /  DBili  x      /  AST  x      /  ALT  x      /  AlkPhos  x      19 Jun 2018 17:05    LIVER FUNCTIONS - ( 19 Jun 2018 17:05 )  Alb: x     / Pro: 6.7 g/dL / ALK PHOS: x     / ALT: x     / AST: x     / GGT: x           MEDICATIONS  (STANDING):  azithromycin   Tablet 500 milliGRAM(s) Oral daily  cefTRIAXone   IVPB 2 Gram(s) IV Intermittent every 24 hours  cefTRIAXone   IVPB      DULoxetine 20 milliGRAM(s) Oral daily  enoxaparin Injectable 40 milliGRAM(s) SubCutaneous every 24 hours  indomethacin 25 milliGRAM(s) Oral two times a day  mesalamine DR Capsule 400 milliGRAM(s) Oral three times a day  oxybutynin 5 milliGRAM(s) Oral two times a day  pantoprazole    Tablet 40 milliGRAM(s) Oral before breakfast    MEDICATIONS  (PRN):  HYDROmorphone  Injectable 0.5 milliGRAM(s) IV Push every 4 hours PRN Severe Pain (7 - 10)  ondansetron Injectable 4 milliGRAM(s) IV Push every 4 hours PRN Nausea and/or Vomiting  oxyCODONE    IR 5 milliGRAM(s) Oral every 4 hours PRN Mild Pain (1 - 3)  oxyCODONE    IR 10 milliGRAM(s) Oral every 4 hours PRN Moderate Pain (4 - 6)  zolpidem 5 milliGRAM(s) Oral at bedtime PRN Insomnia    RADIOLOGY & ADDITIONAL TESTS: perosnally visualized    PHYSICAL EXAM:    General: elderly female in no acute distress  Eyes: PERRLA, EOMI; conjunctiva and sclera clear  Head: Normocephalic; atraumatic  ENMT: No nasal discharge; airway clear  Neck: Supple; non tender; no masses  Respiratory: decreased BS Bibasilarly R>L with bibasilar crackles, + tenderness on palpation of right chest  Cardiovascular: Regular rate and rhythm. S1 and S2  Gastrointestinal: Soft non-tender non-distended; Normal bowel sounds  Genitourinary: No costovertebral angle tenderness  Extremities: Normal range of motion, No clubbing, cyanosis or edema  Vascular: Peripheral pulses palpable 2+ bilaterally  Neurological: Alert and oriented x4  Skin: Warm and dry.   Musculoskeletal: Normal tone, without deformities  Psychiatric: depressed

## 2018-06-20 NOTE — PROGRESS NOTE ADULT - SUBJECTIVE AND OBJECTIVE BOX
[facility]  INFECTIOUS DISEASES AND INTERNAL MEDICINE at [place]  =======================================================  Nathan Campbell MD  Diplomates American Board of Internal Medicine and Infectious Diseases  =======================================================    KARSTEN HALE [id number]    Follow up:  81y/o woman with PMH of  major depression, RA, Crohn's disease and cardiac disease s/p pace maker was admitted on 6/16 with R sided pleuritic chest pain and was found to have a large pleural effusion.   s/p 500cc therapeutic and diagnostic thoracostenosis, with yellow fluid removed.    Blood culture from admission grew 1/2 strep viridans.   She breaths normally with no distress, and has no complaint today.     PAST MEDICAL & SURGICAL HISTORY:  Depression  Cardiac disease: pacemaker 2007  High cholesterol  Rheumatoid arteritis  Crohn disease  Pacemaker    Social Hx: no smoking, or drinking habits    FAMILY HISTORY:  No pertinent family history in first degree relatives    Allergies  sulfa drugs (Anaphylaxis)    Antibiotics:  None     REVIEW OF SYSTEMS:  CONSTITUTIONAL:  No Fever or chills  HEENT:  No diplopia or blurred vision.  No earache, sore throat or runny nose.  CARDIOVASCULAR:  No chest pain or SOB.  RESPIRATORY:  No cough, +shortness of breath, no PND or orthopnea.  GASTROINTESTINAL:  No nausea, vomiting or diarrhea.  GENITOURINARY:  No dysuria, frequency or urgency. No Blood in urine  MUSCULOSKELETAL:  no joint aches, no muscle pain  SKIN:  No change in skin, hair or nails.  NEUROLOGIC:  No paresthesias, fasciculations, seizures or weakness.  PSYCHIATRIC:  depressed mood   ENDOCRINE:  No heat or cold intolerance, polyuria or polydipsia.  HEMATOLOGICAL:  No easy bruising or bleeding.     Physical Exam:  Vital Signs Last 24 Hrs  T(C): 37.1 (20 Jun 2018 08:23), Max: 37.2 (19 Jun 2018 22:25)  T(F): 98.7 (20 Jun 2018 08:23), Max: 99 (19 Jun 2018 22:25)  HR: 75 (20 Jun 2018 08:23) (64 - 87)  BP: 122/63 (20 Jun 2018 08:23) (110/64 - 122/63)  RR: 18 (20 Jun 2018 08:23) (16 - 18)  SpO2: 92% (20 Jun 2018 04:41) (92% - 94%)  GEN: NAD  HEENT: normocephalic and atraumatic. EOMI. PERRL.    NECK: Supple.  No lymphadenopathy   LUNGS: decreased BS in R lower half of lung.   HEART: Regular rate and rhythm without murmur.  ABDOMEN: Soft, nontender, and nondistended.  Positive bowel sounds.    : No CVA tenderness  EXTREMITIES: Without any cyanosis, clubbing, rash, lesions or edema.  NEUROLOGIC: grossly intact.  PSYCHIATRIC: depressed mood, doesn't talk much, eyes closed during interview   SKIN: No ulceration or induration present.      Labs:  06-20    137  |  97<L>  |  20.0  ----------------------------<  89  4.9   |  30.0<H>  |  0.68    Ca    9.1      20 Jun 2018 07:49    TPro  6.7  /  Alb  x   /  TBili  x   /  DBili  x   /  AST  x   /  ALT  x   /  AlkPhos  x   06-19                        11.8   10.3  )-----------( 297      ( 20 Jun 2018 07:49 )             38.2     LIVER FUNCTIONS - ( 19 Jun 2018 17:05 )  Alb: x     / Pro: 6.7 g/dL / ALK PHOS: x     / ALT: x     / AST: x     / GGT: x           RECENT CULTURES:  06-19 @ 12:50 .Body Fluid Pleural Fluid     No growth at 1 day.  Culture in progress    Few White blood cells  No organisms seen    06-18 @ 07:03 .Blood Blood     No growth at 48 hours    06-18 @ 07:01 .Blood Blood Blood Culture PCR    Growth in aerobic bottle: Streptococcus mutans Susceptibility to follow.  Aerobic Bottle: 22:54 Hours to positivity  Anaerobic Bottle: No growth to date    All imaging and data are reviewed.

## 2018-06-20 NOTE — BEHAVIORAL HEALTH ASSESSMENT NOTE - NSBHCONSULTMEDS_PSY_A_CORE FT
No changes recommended at this time.  May consider short term course of low dose antipsychotic if patient becomes agitated or delirium worsens

## 2018-06-20 NOTE — PHYSICAL THERAPY INITIAL EVALUATION ADULT - GAIT PATTERN USED, PT EVAL
unsteadiness throughout, assist for safety, verbal cues for sequencing,  O2 sat p ambulation 87% on room air, 94% when placed back on 2.5 L of O2 in semifowler position in bed

## 2018-06-20 NOTE — PROGRESS NOTE ADULT - ASSESSMENT
81y/o woman with PMH of major depression, RA, Crohn's disease and cardiac disease s/p pace maker, was admitted on 6/16 with R sided pleuritic chest pain and was found to have a large pleural effusion possibly  related to RA since procalcitonin is noraml and she is not septic. fluid culture is pending.   Blood culture from admission grew 1/2 strep viridans.     1-Bacteremia:  -cont ceftriaxine 2gm daily  -repeat blood culture result pending.   -TTE     2-Pleural effusion:  -follow up the fluid culture and prot and LDH.   -Questionable underlying pneumonia since procalcitonin is not high.   -Cont ceftriaxone for now since leukocytosis is resolving there is still possibility of infectious causes.   -ceftriaxone covers bacteremia and pneumonia both.

## 2018-06-20 NOTE — PROGRESS NOTE ADULT - ATTENDING COMMENTS
Full code Full code  Hx of RA - awaiting call back from pt's rheumatologist Dr. Leija  Message left to

## 2018-06-20 NOTE — BEHAVIORAL HEALTH ASSESSMENT NOTE - SUMMARY
Patient is an 82 year old, female; domiciled with ; with past psychiatric history of depression, not currently following psychiatrist or therapist; no known past psychiatric  hospitalizations; no known suicide attempts; no known history of violence or arrests; no active substance abuse or known history of complicated withdrawal;  PMHx of Crohn's disease,  Cardiac Dx s/p PPM presented with severe Right sided chest wall pain,. pleuretic in nature.  Patient found to have moderate right pleural effusion with atelectasis on CT chest. Pt made a statement to the effect that she minimally mobile she does not want to as she extremely depressed: "I want it all to be over". Asked to evaluate for depressive sx's and possible suicidal ideation .  Patient with current delirium superimposed on likely dementia.  She is intermittently confused, perseverative with fluctuating attention, distractibility.  She scored a 10/30 on MMSE.  She denies any current S/H I/I/P and is not a reliable historian.  No psychotic sx's were elicited.  She has been compliant with treatment. She has not been aggressive or agitated.  Passive suicidal statements are likely made in context of confusion and not an indicator of acute dangerousness.  Will continue to evaluate patient's mental status.

## 2018-06-20 NOTE — BEHAVIORAL HEALTH ASSESSMENT NOTE - OTHER PAST PSYCHIATRIC HISTORY (INCLUDE DETAILS REGARDING ONSET, COURSE OF ILLNESS, INPATIENT/OUTPATIENT TREATMENT)
Unclear past psychiatric history of depression, no known inpatient hospitalizations, no known suicide attempts.  Currently on Cymbalta and Ambien prescribed by PMD. Patient reports she has followed by psychiatrists in the past.

## 2018-06-20 NOTE — BEHAVIORAL HEALTH ASSESSMENT NOTE - NSBHCHARTREVIEWVS_PSY_A_CORE FT
Vital Signs Last 24 Hrs  T(C): 36.8 (20 Jun 2018 15:48), Max: 37.2 (19 Jun 2018 22:25)  T(F): 98.3 (20 Jun 2018 15:48), Max: 99 (19 Jun 2018 22:25)  HR: 95 (20 Jun 2018 15:48) (64 - 95)  BP: 113/71 (20 Jun 2018 15:48) (110/64 - 122/63)  BP(mean): --  RR: 18 (20 Jun 2018 15:48) (16 - 18)  SpO2: 95% (20 Jun 2018 15:48) (92% - 95%)

## 2018-06-20 NOTE — BEHAVIORAL HEALTH ASSESSMENT NOTE - NSBHCHARTREVIEWLAB_PSY_A_CORE FT
11.8   10.3  )-----------( 297      ( 20 Jun 2018 07:49 )             38.2     06-20    137  |  97<L>  |  20.0  ----------------------------<  89  4.9   |  30.0<H>  |  0.68    Ca    9.1      20 Jun 2018 07:49    TPro  6.7  /  Alb  x   /  TBili  x   /  DBili  x   /  AST  x   /  ALT  x   /  AlkPhos  x   06-19    LIVER FUNCTIONS - ( 19 Jun 2018 17:05 )  Alb: x     / Pro: 6.7 g/dL / ALK PHOS: x     / ALT: x     / AST: x     / GGT: x

## 2018-06-20 NOTE — BEHAVIORAL HEALTH ASSESSMENT NOTE - HPI (INCLUDE ILLNESS QUALITY, SEVERITY, DURATION, TIMING, CONTEXT, MODIFYING FACTORS, ASSOCIATED SIGNS AND SYMPTOMS)
Patient is an 82 year old, female; domiciled with ; with past psychiatric history of depression, not currently following psychiatrist or therapist; no known past psychiatric  hospitalizations; no known suicide attempts; no known history of violence or arrests; no active substance abuse or known history of complicated withdrawal;  PMHx of Crohn's disease,  Cardiac Dx s/p PPM presented with severe Right sided chest wall pain,. pleuretic in nature.  Patient found to have moderate right pleural effusion with atelectasis on CT chest. Pt made a statement to the effect that she minimally mobile she does not want to as she extremely depressed: "I want it all to be over". Asked to evaluate for depressive sx's and possible suicidal ideation .      Patient has been reportedly confused with nurse.  She often answer questions when nurse is speaking to other patient. She has been AOx2 and as per nurse unable to state name of hospital.  She  has not been aggressive, or agitated and has been compliant with treatment.  Her baseline cognitive functioning is currently unclear. Patient is unable to describe events that brought her to hospital. She focused on making statements about her  and that she had some "cardiovascular circulation" issues.  Patient scored a 10/30 in MMSE.  She was able to correctly name year and month.  She was unable to state name of hospital.  She repeated 1/3 words after 5 minutes.  She was unable to spell WORLD backwards. She was unable to follow 3 step command, unable to write a sentence, and unable to copy intersecting pentagons.  On interview, she was perseverative and easily distractible.  She was unable to sustain attention, and at times fell back to sleep.  She exhibited poor eye contact.       She admits that she felt depressed at times but is unable to specify how long.  She denies any S/H I/I/P.  She states she would not hurt herself because of her children and grandchildren.  Concerning other psychiatric symptoms, pt any aggressive or violent behavior towards others. Pt denies any episodes of bizarre happiness, unusual energy, unusual nightime excitation or other common symptoms of ayala. Pt denies hearing voices or seeing things.  No delusions were elicited.  Patient denies any anxiety at this time.         Attempted to call pt's  to get more information about her baseline functioning and left voicemail as he is currently unavailable. Patient is taking Cymbalta 20 mg daily and Ambien 5 mg prescribed by PMD

## 2018-06-20 NOTE — PROGRESS NOTE ADULT - PROBLEM SELECTOR PLAN 1
on right, pleuritic etiology with no fever, negative procalcitonin and mildly elevated WBC - moderate pleural effusion on CT noted - as per CTS - IR for diagnostic and therapeutic thoracocentesis done 6/19 with 500 cc of clear yellow fluid drained, no abx for now, f/u BCx positive - will have ID evaluation on right, pleuritic etiology with no fever, negative procalcitonin and mildly elevated WBC - moderate pleural effusion on CT noted - as per CTS - IR for diagnostic and therapeutic thoracocentesis done 6/19 with 500 cc of clear yellow fluid drained, started on IV abx as BCx was positive, but fluid is more consistent with rheumatoid effusion - will continue IV abx until flud Cx and repeat BCx are back to determine if further IV abx needed, discussed with ID

## 2018-06-21 LAB
-  CEFTRIAXONE: SIGNIFICANT CHANGE UP
-  CLINDAMYCIN: SIGNIFICANT CHANGE UP
-  ERYTHROMYCIN: SIGNIFICANT CHANGE UP
-  PENICILLIN: SIGNIFICANT CHANGE UP
-  VANCOMYCIN: SIGNIFICANT CHANGE UP
ANION GAP SERPL CALC-SCNC: 15 MMOL/L — SIGNIFICANT CHANGE UP (ref 5–17)
BUN SERPL-MCNC: 20 MG/DL — SIGNIFICANT CHANGE UP (ref 8–20)
CALCIUM SERPL-MCNC: 9.4 MG/DL — SIGNIFICANT CHANGE UP (ref 8.6–10.2)
CHLORIDE SERPL-SCNC: 98 MMOL/L — SIGNIFICANT CHANGE UP (ref 98–107)
CO2 SERPL-SCNC: 27 MMOL/L — SIGNIFICANT CHANGE UP (ref 22–29)
CREAT SERPL-MCNC: 0.68 MG/DL — SIGNIFICANT CHANGE UP (ref 0.5–1.3)
GLUCOSE SERPL-MCNC: 103 MG/DL — SIGNIFICANT CHANGE UP (ref 70–115)
HCT VFR BLD CALC: 37 % — SIGNIFICANT CHANGE UP (ref 37–47)
HGB BLD-MCNC: 11.5 G/DL — LOW (ref 12–16)
MCHC RBC-ENTMCNC: 29.6 PG — SIGNIFICANT CHANGE UP (ref 27–31)
MCHC RBC-ENTMCNC: 31.1 G/DL — LOW (ref 32–36)
MCV RBC AUTO: 95.1 FL — SIGNIFICANT CHANGE UP (ref 81–99)
METHOD TYPE: SIGNIFICANT CHANGE UP
PLATELET # BLD AUTO: 321 K/UL — SIGNIFICANT CHANGE UP (ref 150–400)
POTASSIUM SERPL-MCNC: 4.9 MMOL/L — SIGNIFICANT CHANGE UP (ref 3.5–5.3)
POTASSIUM SERPL-SCNC: 4.9 MMOL/L — SIGNIFICANT CHANGE UP (ref 3.5–5.3)
RBC # BLD: 3.89 M/UL — LOW (ref 4.4–5.2)
RBC # FLD: 13.8 % — SIGNIFICANT CHANGE UP (ref 11–15.6)
SODIUM SERPL-SCNC: 140 MMOL/L — SIGNIFICANT CHANGE UP (ref 135–145)
WBC # BLD: 8.7 K/UL — SIGNIFICANT CHANGE UP (ref 4.8–10.8)
WBC # FLD AUTO: 8.7 K/UL — SIGNIFICANT CHANGE UP (ref 4.8–10.8)

## 2018-06-21 PROCEDURE — 99223 1ST HOSP IP/OBS HIGH 75: CPT

## 2018-06-21 PROCEDURE — 99233 SBSQ HOSP IP/OBS HIGH 50: CPT | Mod: GC

## 2018-06-21 PROCEDURE — 93306 TTE W/DOPPLER COMPLETE: CPT | Mod: 26

## 2018-06-21 PROCEDURE — 90792 PSYCH DIAG EVAL W/MED SRVCS: CPT

## 2018-06-21 PROCEDURE — 99232 SBSQ HOSP IP/OBS MODERATE 35: CPT

## 2018-06-21 RX ADMIN — Medication 25 MILLIGRAM(S): at 17:03

## 2018-06-21 RX ADMIN — Medication 400 MILLIGRAM(S): at 21:29

## 2018-06-21 RX ADMIN — Medication 400 MILLIGRAM(S): at 17:03

## 2018-06-21 RX ADMIN — DULOXETINE HYDROCHLORIDE 20 MILLIGRAM(S): 30 CAPSULE, DELAYED RELEASE ORAL at 17:03

## 2018-06-21 RX ADMIN — Medication 25 MILLIGRAM(S): at 18:36

## 2018-06-21 RX ADMIN — Medication 5 MILLIGRAM(S): at 17:03

## 2018-06-21 RX ADMIN — PANTOPRAZOLE SODIUM 40 MILLIGRAM(S): 20 TABLET, DELAYED RELEASE ORAL at 05:00

## 2018-06-21 RX ADMIN — CEFTRIAXONE 100 GRAM(S): 500 INJECTION, POWDER, FOR SOLUTION INTRAMUSCULAR; INTRAVENOUS at 17:02

## 2018-06-21 RX ADMIN — Medication 250 MILLIGRAM(S): at 05:00

## 2018-06-21 RX ADMIN — Medication 400 MILLIGRAM(S): at 05:00

## 2018-06-21 RX ADMIN — Medication 250 MILLIGRAM(S): at 17:03

## 2018-06-21 RX ADMIN — AZITHROMYCIN 500 MILLIGRAM(S): 500 TABLET, FILM COATED ORAL at 17:03

## 2018-06-21 RX ADMIN — ENOXAPARIN SODIUM 40 MILLIGRAM(S): 100 INJECTION SUBCUTANEOUS at 14:27

## 2018-06-21 RX ADMIN — Medication 5 MILLIGRAM(S): at 05:00

## 2018-06-21 RX ADMIN — Medication 25 MILLIGRAM(S): at 05:00

## 2018-06-21 NOTE — CONSULT NOTE ADULT - ASSESSMENT
82 year old female with hx of RA currently admitted for pleural effusion, which is possibly due to the RA (fluid studies overall c/w RA except for pH) though other etiologies also possible (e.g. malignancy).  Currently feeling much better.  No evidence of active arthritis at this time.   - Agree with indomethacin, though would monitor Cr closely.   - If effusion recurs, would start oral steroids (prednisone 20mg daily).    - f/u fluid cytology.

## 2018-06-21 NOTE — PROGRESS NOTE ADULT - SUBJECTIVE AND OBJECTIVE BOX
CC: Rt sided chest Pain (18 Jun 2018 06:06)    HPI: 83 y/o Female with PMHx of Crohn's disease, Depression presented with severe Right sided chest wall pain - pt was evaluated in ER day prior for same pain - pleuritic in nature, + tenderness on exam and with movements - found to have moderate right pleural effusion with atelectasis on CT chest. Pt minimally mobile as per her as she does not want to.    INTERVAL HPI/OVERNIGHT EVENTS: pain controlled, + severe depression, s/p thoracocentesis, forgetful, continue to c/o CP on and off, refusing indomethacin due to mucosal ulcerations in the past  Other ROS reviewed and neg     Vital Signs Last 24 Hrs  T(C): 36.7 (21 Jun 2018 08:18), Max: 36.9 (20 Jun 2018 22:00)  T(F): 98 (21 Jun 2018 08:18), Max: 98.4 (20 Jun 2018 22:00)  HR: 72 (21 Jun 2018 08:18) (72 - 95)  BP: 120/72 (21 Jun 2018 08:18) (113/71 - 124/68)  RR: 19 (21 Jun 2018 08:18) (18 - 19)  SpO2: 95% (20 Jun 2018 22:00) (86% - 95%)                                11.5   8.7   )-----------( 321      ( 21 Jun 2018 07:55 )             37.0     21 Jun 2018 07:55    140    |  98     |  20.0   ----------------------------<  103    4.9     |  27.0   |  0.68     Ca    9.4        21 Jun 2018 07:55    TPro  6.7    /  Alb  x      /  TBili  x      /  DBili  x      /  AST  x      /  ALT  x      /  AlkPhos  x      19 Jun 2018 17:05    LIVER FUNCTIONS - ( 19 Jun 2018 17:05 )  Alb: x     / Pro: 6.7 g/dL / ALK PHOS: x     / ALT: x     / AST: x     / GGT: x           MEDICATIONS  (STANDING):  azithromycin   Tablet 500 milliGRAM(s) Oral daily  cefTRIAXone   IVPB 2 Gram(s) IV Intermittent every 24 hours  cefTRIAXone   IVPB      DULoxetine 20 milliGRAM(s) Oral daily  enoxaparin Injectable 40 milliGRAM(s) SubCutaneous every 24 hours  indomethacin 25 milliGRAM(s) Oral two times a day  mesalamine DR Capsule 400 milliGRAM(s) Oral three times a day  oxybutynin 5 milliGRAM(s) Oral two times a day  pantoprazole    Tablet 40 milliGRAM(s) Oral before breakfast  saccharomyces boulardii 250 milliGRAM(s) Oral two times a day    MEDICATIONS  (PRN):  HYDROmorphone  Injectable 0.5 milliGRAM(s) IV Push every 4 hours PRN Severe Pain (7 - 10)  ondansetron Injectable 4 milliGRAM(s) IV Push every 4 hours PRN Nausea and/or Vomiting  oxyCODONE    IR 5 milliGRAM(s) Oral every 4 hours PRN Mild Pain (1 - 3)  oxyCODONE    IR 10 milliGRAM(s) Oral every 4 hours PRN Moderate Pain (4 - 6)  zolpidem 5 milliGRAM(s) Oral at bedtime PRN Insomnia    RADIOLOGY & ADDITIONAL TESTS: perosnally visualized    PHYSICAL EXAM:    General: elderly female in no acute distress  Eyes: PERRLA, EOMI; conjunctiva and sclera clear  Head: Normocephalic; atraumatic  ENMT: No nasal discharge; airway clear  Neck: Supple; non tender; no masses  Respiratory: decreased BS Bibasilarly R>L with bibasilar crackles, + tenderness on palpation of right chest  Cardiovascular: Regular rate and rhythm. S1 and S2  Gastrointestinal: Soft non-tender non-distended; Normal bowel sounds  Genitourinary: No costovertebral angle tenderness  Extremities: Normal range of motion, No clubbing, cyanosis or edema  Vascular: Peripheral pulses palpable 2+ bilaterally  Neurological: Alert and oriented x4  Skin: Warm and dry.   Musculoskeletal: Normal tone, without deformities  Psychiatric: depressed

## 2018-06-21 NOTE — CONSULT NOTE ADULT - SUBJECTIVE AND OBJECTIVE BOX
HISTORY OF PRESENT ILLNESS:    Patient unable to provide hx due to dementia.  History therefore obtained from chart and from most recent note from her rheumatologist.  HPI:  Ms Johnson, she is a very pleasant 83 y/o Female with the Past medical H/O Crohn's disease, depression, presented to  to ED c/o Severe Rt sided  chest wall paithatlast night but woke up in severe pain and it was constant all day. Pain was worse with movement and palpation. She took Tylenol for pain relief. She was given Tramadol in ED with minimal relief. Denies fever, chills.  Workup in ER showed Increasing WBC counts 14.5. After giving Toradol, IV Dilaudid and Oxycodone IR, her Pain was relieved.   Since admission, pt underwent thoracentesis.  Currently feeling "much better."  Of note, pt w/ long-standing hx of RA.  Was previously on MTX, Enbrel, Xeljanz though RA has been quiescent for the past 2 years off all DMARDs/biologics.  She denies any current joint pain.    PAST MEDICAL & SURGICAL HISTORY:  Depression  Cardiac disease: pacemaker 2007  High cholesterol  Rheumatoid arteritis  Crohn disease  Pacemaker      ROS: unable to obtain    MEDICATIONS  (STANDING):  azithromycin   Tablet 500 milliGRAM(s) Oral daily  cefTRIAXone   IVPB 2 Gram(s) IV Intermittent every 24 hours  cefTRIAXone   IVPB      DULoxetine 20 milliGRAM(s) Oral daily  enoxaparin Injectable 40 milliGRAM(s) SubCutaneous every 24 hours  indomethacin 25 milliGRAM(s) Oral two times a day  mesalamine DR Capsule 400 milliGRAM(s) Oral three times a day  oxybutynin 5 milliGRAM(s) Oral two times a day  pantoprazole    Tablet 40 milliGRAM(s) Oral before breakfast  saccharomyces boulardii 250 milliGRAM(s) Oral two times a day    MEDICATIONS  (PRN):  HYDROmorphone  Injectable 0.5 milliGRAM(s) IV Push every 4 hours PRN Severe Pain (7 - 10)  ondansetron Injectable 4 milliGRAM(s) IV Push every 4 hours PRN Nausea and/or Vomiting  oxyCODONE    IR 5 milliGRAM(s) Oral every 4 hours PRN Mild Pain (1 - 3)  oxyCODONE    IR 10 milliGRAM(s) Oral every 4 hours PRN Moderate Pain (4 - 6)  zolpidem 5 milliGRAM(s) Oral at bedtime PRN Insomnia      Allergies    sulfa drugs (Anaphylaxis)      FAMILY HISTORY:  No pertinent family history in first degree relatives      SOCIAL HISTORY:  Tobacco--   none            Vital Signs Last 24 Hrs  T(C): 36.6 (21 Jun 2018 15:43), Max: 36.9 (20 Jun 2018 22:00)  T(F): 97.9 (21 Jun 2018 15:43), Max: 98.4 (20 Jun 2018 22:00)  HR: 70 (21 Jun 2018 15:43) (70 - 77)  BP: 141/63 (21 Jun 2018 15:43) (120/72 - 141/63)  BP(mean): --  RR: 18 (21 Jun 2018 15:43) (18 - 19)  SpO2: 98% (21 Jun 2018 15:43) (95% - 98%)    PHYSICAL EXAM:  General :  NAD  HEENT--  no oral ulcers  Nodes--   LAD  Lungs--  decreased BS and crackles at B/L bases  Heart--  RRR, nlS1 &S2 normal;   Abdomen--  soft, NT, ND +BS  Skin:  no rashes  Musculoskeletal exam:  (+)swelling in B/L 2nd-5th MCP's though non-tender, no tender joints; normal ROM in all joints    LABS:                        11.5   8.7   )-----------( 321      ( 21 Jun 2018 07:55 )             37.0     06-21    140  |  98  |  20.0  ----------------------------<  103  4.9   |  27.0  |  0.68    Ca    9.4      21 Jun 2018 07:55      Cell Count, Body Fluid (06.19.18 @ 12:33)    Eosinophil Count - Body Fluid: 4 %    Monocyte/Macrophage Count - Body Fluid: 5 %    Fluid Segmented Granulocytes: 84 %    Fluid Type: Pleural    Body Fluid Appearance: Cloudy    BF Lymphocytes: 7 %    Tube Type: Tube 2    Color - Body Fluid: Straw    Total Nucleated Cell Count, Body Fluid: 3056 /uL    Total RBC Count: 2050 /uL    Protein Total, Fluid (06.19.18 @ 17:48)    Protein Total, Fluid: 3.5: Test Repeated  Reference Ranges have NOT been established for analytes in body fluids  because of variability in body fluid composition.  The  has not determined the efficacy of this test when  performed on fluid specimens. The performance characteristics of this  test [facility] g/dL    Glucose, Fluid (06.19.18 @ 17:48)    Glucose, Fluid: 79: Reference Ranges have NOT  been established for analytes in body fluids  because of variability in body fluid composition.  The  has not determined the efficacy of this test when  performed on fluid specimens. The performance characteristics of this  test were determined by [facility] [employer]. mg/dL    Lactate Dehydrogenase, Fluid (06.19.18 @ 17:48)    Lactate Dehydrogenase, Fluid: 974: Test Repeated  Reference Ranges have NOT been established for analytes in body fluids  because of variability in body fluid composition.  The  has not determined the efficacy of this test when  performed on fluid specimens. The performance characteristics of this  test were determined by [facility] [employer]. U/L    pH, Fluid (06.19.18 @ 12:33)    pH, Fluid: 8.5    Culture - Body Fluid with Gram Stain (06.19.18 @ 12:50)    Gram Stain:   Few White blood cells  No organisms seen    Specimen Source: .Body Fluid Pleural Fluid    Culture Results:   No growth at 2 days.  Culture in progress

## 2018-06-21 NOTE — PROGRESS NOTE ADULT - PROBLEM SELECTOR PLAN 1
on right, pleuritic etiology with no fever, negative procalcitonin and mildly elevated WBC - moderate pleural effusion on CT noted - as per CTS - IR for diagnostic and therapeutic thoracocentesis done 6/19 with 500 cc of clear yellow fluid drained, started on IV abx as BCx was positive, but fluid is more consistent with rheumatoid effusion - will continue IV abx until flud Cx and repeat BCx are back to determine if further IV abx needed, discussed with ID  - rheumatology evaluation, noted elevated ESR/CRP

## 2018-06-21 NOTE — PROGRESS NOTE ADULT - ASSESSMENT
83y/o woman with PMH of major depression, RA, Crohn's disease and cardiac disease s/p pace maker, was admitted on 6/16 with R sided pleuritic chest pain and was found to have a large pleural effusion possibly  related to RA since procalcitonin is normal and she is not septic. fluid culture is negative so far but based on LDH levels it is exudate.   Blood culture from admission grew 1/2 strep viridans.     1-Bacteremia:  -cont ceftriaxine 2gm daily  -repeat blood culture from 6/19 so far negative   -TTE negative for vegetation   -Last day of treatment for bacteremia would be 7/2/18  -Can place midline if plan for discharge     2-Pleural effusion:  -Questionable underlying pneumonia since procalcitonin is not high but at the same time based on LDH level it is exudate that can happen with RA too.   -ceftriaxone covers bacteremia and pneumonia both.   -Rheumatology follow up for possible serositis

## 2018-06-21 NOTE — PROGRESS NOTE ADULT - SUBJECTIVE AND OBJECTIVE BOX
[facility]  INFECTIOUS DISEASES AND INTERNAL MEDICINE at [place]  =======================================================  Nathan Campbell MD  Diplomates American Board of Internal Medicine and Infectious Diseases  =======================================================    KARSTEN HALE [id number]    Follow up:    83y/o woman with PMH of  major depression, RA, Crohn's disease and cardiac disease s/p pace maker was admitted on 6/16 with R sided pleuritic chest pain and was found to have a large pleural effusion.   s/p 500cc therapeutic and diagnostic thoracostenosis, with yellow fluid removed.    Blood culture from admission grew 1/2 strep viridans.   She is very weak and talking to herself, but seems comfortable. afebrile.      PAST MEDICAL & SURGICAL HISTORY:  Depression  Cardiac disease: pacemaker 2007  High cholesterol  Rheumatoid arteritis  Crohn disease  Pacemaker    Social Hx: no smoking, or drinking habits    FAMILY HISTORY:  No pertinent family history in first degree relatives    Allergies  sulfa drugs (Anaphylaxis)    Antibiotics:  None     REVIEW OF SYSTEMS:  CONSTITUTIONAL:  No Fever or chills  HEENT:  No diplopia or blurred vision.  No earache, sore throat or runny nose.  CARDIOVASCULAR:  No chest pain or SOB.  RESPIRATORY:  No cough, +shortness of breath, no PND or orthopnea.  GASTROINTESTINAL:  No nausea, vomiting or diarrhea.  GENITOURINARY:  No dysuria, frequency or urgency. No Blood in urine  MUSCULOSKELETAL:  no joint aches, no muscle pain  SKIN:  No change in skin, hair or nails.  NEUROLOGIC:  No paresthesias, fasciculations, seizures or weakness.  PSYCHIATRIC:  depressed mood   ENDOCRINE:  No heat or cold intolerance, polyuria or polydipsia.  HEMATOLOGICAL:  No easy bruising or bleeding.     Physical Exam:  Vital Signs Last 24 Hrs  T(C): 36.6 (21 Jun 2018 15:43), Max: 36.9 (20 Jun 2018 22:00)  T(F): 97.9 (21 Jun 2018 15:43), Max: 98.4 (20 Jun 2018 22:00)  HR: 70 (21 Jun 2018 15:43) (70 - 77)  BP: 141/63 (21 Jun 2018 15:43) (120/72 - 141/63)  RR: 18 (21 Jun 2018 15:43) (18 - 19)  SpO2: 98% (21 Jun 2018 15:43) (95% - 98%)  GEN: NAD  HEENT: normocephalic and atraumatic. EOMI. PERRL.    NECK: Supple.  No lymphadenopathy   LUNGS: decreased BS in R lower half of lung.   HEART: Regular rate and rhythm without murmur.  ABDOMEN: Soft, nontender, and nondistended.  Positive bowel sounds.    : No CVA tenderness  EXTREMITIES: Without any cyanosis, clubbing, rash, lesions or edema.  NEUROLOGIC: grossly intact.  PSYCHIATRIC: depressed mood, doesn't talk much, eyes closed during interview   SKIN: No ulceration or induration present.    Labs:  06-21    140  |  98  |  20.0  ----------------------------<  103  4.9   |  27.0  |  0.68    Ca    9.4      21 Jun 2018 07:55                        11.5   8.7   )-----------( 321      ( 21 Jun 2018 07:55 )             37.0       RECENT CULTURES:  06-19 @ 17:05 .Blood Blood     No growth at 48 hours    06-19 @ 12:50 .Body Fluid Pleural Fluid     No growth at 2 days.  Culture in progress    Few White blood cells  No organisms seen    06-18 @ 07:03 .Blood Blood     No growth at 48 hours    06-18 @ 07:01 .Blood Blood Streptococcus mutans  Blood Culture PCR    Growth in aerobic bottle: Streptococcus mutans  Aerobic Bottle: 22:54 Hours to positivity  Anaerobic Bottle: No growth to date      All imaging and data are reviewed.

## 2018-06-21 NOTE — PROGRESS NOTE ADULT - ATTENDING COMMENTS
Full code  Hx of RA - awaiting call back from pt's rheumatologist Dr. Leija   Message left to   Rheumatology consult requested

## 2018-06-22 ENCOUNTER — TRANSCRIPTION ENCOUNTER (OUTPATIENT)
Age: 83
End: 2018-06-22

## 2018-06-22 PROCEDURE — 99233 SBSQ HOSP IP/OBS HIGH 50: CPT | Mod: GC

## 2018-06-22 PROCEDURE — 99232 SBSQ HOSP IP/OBS MODERATE 35: CPT

## 2018-06-22 PROCEDURE — 71045 X-RAY EXAM CHEST 1 VIEW: CPT | Mod: 26

## 2018-06-22 PROCEDURE — 93010 ELECTROCARDIOGRAM REPORT: CPT

## 2018-06-22 RX ORDER — MESALAMINE 400 MG
1 TABLET, DELAYED RELEASE (ENTERIC COATED) ORAL
Qty: 0 | Refills: 0 | COMMUNITY
Start: 2018-06-22

## 2018-06-22 RX ORDER — ZOLPIDEM TARTRATE 10 MG/1
1 TABLET ORAL
Qty: 0 | Refills: 0 | COMMUNITY
Start: 2018-06-22

## 2018-06-22 RX ORDER — PANTOPRAZOLE SODIUM 20 MG/1
1 TABLET, DELAYED RELEASE ORAL
Qty: 0 | Refills: 0 | DISCHARGE
Start: 2018-06-22

## 2018-06-22 RX ORDER — DULOXETINE HYDROCHLORIDE 30 MG/1
1 CAPSULE, DELAYED RELEASE ORAL
Qty: 0 | Refills: 0 | DISCHARGE
Start: 2018-06-22

## 2018-06-22 RX ORDER — QUETIAPINE FUMARATE 200 MG/1
12.5 TABLET, FILM COATED ORAL AT BEDTIME
Qty: 0 | Refills: 0 | Status: DISCONTINUED | OUTPATIENT
Start: 2018-06-22 | End: 2018-06-24

## 2018-06-22 RX ORDER — SACCHAROMYCES BOULARDII 250 MG
1 POWDER IN PACKET (EA) ORAL
Qty: 0 | Refills: 0 | COMMUNITY
Start: 2018-06-22

## 2018-06-22 RX ORDER — QUETIAPINE FUMARATE 200 MG/1
12.5 TABLET, FILM COATED ORAL
Qty: 0 | Refills: 0 | COMMUNITY
Start: 2018-06-22

## 2018-06-22 RX ORDER — INDOMETHACIN 50 MG
1 CAPSULE ORAL
Qty: 0 | Refills: 0 | COMMUNITY
Start: 2018-06-22

## 2018-06-22 RX ADMIN — ENOXAPARIN SODIUM 40 MILLIGRAM(S): 100 INJECTION SUBCUTANEOUS at 12:12

## 2018-06-22 RX ADMIN — QUETIAPINE FUMARATE 12.5 MILLIGRAM(S): 200 TABLET, FILM COATED ORAL at 21:56

## 2018-06-22 RX ADMIN — Medication 25 MILLIGRAM(S): at 05:18

## 2018-06-22 RX ADMIN — Medication 250 MILLIGRAM(S): at 17:58

## 2018-06-22 RX ADMIN — DULOXETINE HYDROCHLORIDE 20 MILLIGRAM(S): 30 CAPSULE, DELAYED RELEASE ORAL at 12:12

## 2018-06-22 RX ADMIN — PANTOPRAZOLE SODIUM 40 MILLIGRAM(S): 20 TABLET, DELAYED RELEASE ORAL at 05:19

## 2018-06-22 RX ADMIN — Medication 250 MILLIGRAM(S): at 05:19

## 2018-06-22 RX ADMIN — CEFTRIAXONE 100 GRAM(S): 500 INJECTION, POWDER, FOR SOLUTION INTRAMUSCULAR; INTRAVENOUS at 15:14

## 2018-06-22 RX ADMIN — OXYCODONE HYDROCHLORIDE 10 MILLIGRAM(S): 5 TABLET ORAL at 20:55

## 2018-06-22 RX ADMIN — Medication 5 MILLIGRAM(S): at 05:19

## 2018-06-22 RX ADMIN — Medication 5 MILLIGRAM(S): at 17:58

## 2018-06-22 RX ADMIN — AZITHROMYCIN 500 MILLIGRAM(S): 500 TABLET, FILM COATED ORAL at 13:17

## 2018-06-22 RX ADMIN — Medication 400 MILLIGRAM(S): at 05:19

## 2018-06-22 RX ADMIN — Medication 25 MILLIGRAM(S): at 17:59

## 2018-06-22 RX ADMIN — Medication 25 MILLIGRAM(S): at 17:58

## 2018-06-22 RX ADMIN — OXYCODONE HYDROCHLORIDE 10 MILLIGRAM(S): 5 TABLET ORAL at 20:26

## 2018-06-22 RX ADMIN — Medication 400 MILLIGRAM(S): at 15:13

## 2018-06-22 RX ADMIN — Medication 400 MILLIGRAM(S): at 21:56

## 2018-06-22 NOTE — DISCHARGE NOTE ADULT - HOSPITAL COURSE
Assessment and Plan:   · Assessment		  81 y/o Female with PMHx of Crohn's disease, Depression, Cardiac Dx s/p PPM presented with severe Right sided chest wall pain - pt was evaluated in ER day prior for same pain - pleuritic in nature, + tenderness on exam and with movements - found to have moderate right pleural effusion with atelectasis on CT chest. Pt minimally mobile as per her as she does not want to as she extremely depressed: "I want it all to be over". Underwent thoracocentesis on 6/19 with 500cc of clear yellow fluid under local anesthesia with significant improvement on CXR and clinically and elevated LDH to 900's. Plan to treat with indomethacin as it's likely RA related pleural effusion and complete 2 week course of po abx as initial BC was positive for Strep Viridans. If fluid reaccumulates will need po steroids course.    Problem/Plan - 1:  ·  Problem: Chest wall pain.  Plan: on right, pleuritic etiology with no fever, negative procalcitonin and mildly elevated WBC - moderate pleural effusion on CT noted - as per CTS - IR for diagnostic and therapeutic thoracocentesis done 6/19 with 500 cc of clear yellow fluid drained, started on IV abx as BCx was positive, but fluid is more consistent with rheumatoid effusion - flud Cx and repeat BCx are negative, discussed with ID - will change to po vantin to complete 2 weeks course  - rheumatology evaluation appreciated, noted elevated ESR/CRP, continue indomethacin with PPI prophylaxis, if fluid reaccumulate will need po prednisone course.     Problem/Plan - 2:  ·  Problem: Leukocytosis, unspecified type.  Plan: - likely reactive from pain.     Problem/Plan - 3:  ·  Problem: Crohn's disease with complication, unspecified gastrointestinal tract location.  Plan: no exacerbation, continue pentasa.     Problem/Plan - 4:  ·  Problem: Other depression.  Plan: will obtain psychiatry evaluation as ? SI - advised haldol prn.     Problem/Plan - 5:  ·  Problem: Preventive measure.  Plan: - SQ Lovenox.     Attending Attestation:   Full code  Hx of RA - f/u with outpatient rheumatologist Dr. Leija   CXR reviewed - improved Right pleural effusion  EKG reviewed - V-paced, will start po seroquel at night with recommendation to titrate to effect  DC planning when JULIAN arranged.     45 minutes spent on total encounter; more than 50% of the visit was spent counseling and/or coordinating care by the attending physician.     Plan discussed with ID, RN, Psychiatry. Assessment and Plan:   · Assessment		  83 y/o Female with PMHx of Crohn's disease, Depression, Cardiac Dx s/p PPM presented with severe Right sided chest wall pain - pt was evaluated in ER day prior for same pain - pleuritic in nature, + tenderness on exam and with movements - found to have moderate right pleural effusion with atelectasis on CT chest. Pt minimally mobile as per her as she does not want to as she extremely depressed: "I want it all to be over". Underwent thoracocentesis on 6/19 with 500cc of clear yellow fluid under local anesthesia with significant improvement on CXR and clinically and elevated LDH to 900's. Plan to treat with indomethacin as it's likely RA related pleural effusion and complete 2 week course of po abx as initial BC was positive for Strep Viridans. If fluid reaccumulates will need po steroids course.    Problem/Plan - 1:  ·  Problem: Chest wall pain.  Plan: on right, pleuritic etiology with no fever, negative procalcitonin and mildly elevated WBC - moderate pleural effusion on CT noted - as per CTS - IR for diagnostic and therapeutic thoracocentesis done 6/19 with 500 cc of clear yellow fluid drained, started on IV abx as BCx was positive, but fluid is more consistent with rheumatoid effusion - flud Cx and repeat BCx are negative, discussed with ID - will change to po vantin to complete 2 weeks course  - rheumatology evaluation appreciated, noted elevated ESR/CRP, continue indomethacin with PPI prophylaxis, if fluid reaccumulate will need po prednisone course.     Problem/Plan - 2:  ·  Problem: Leukocytosis, unspecified type.  Plan: - likely reactive from pain.     Problem/Plan - 3:  ·  Problem: Crohn's disease with complication, unspecified gastrointestinal tract location.  Plan: no exacerbation, continue pentasa.     Problem/Plan - 4:  ·  Problem: Other depression.  Plan: will obtain psychiatry evaluation as ? SI - advised haldol prn.     Problem/Plan - 5:  ·  Problem: Preventive measure.  Plan: - SQ Lovenox.     Attending Attestation:   Full code  Hx of RA - f/u with outpatient rheumatologist Dr. Leija   CXR reviewed - improved Right pleural effusion  EKG reviewed - V-paced, will start po seroquel at night with recommendation to titrate to effect  HTN - started norvasc  DC planning when JULIAN arranged.     45 minutes spent on total encounter; more than 50% of the visit was spent counseling and/or coordinating care by the attending physician.     Plan discussed with ID, RN, Psychiatry.

## 2018-06-22 NOTE — DISCHARGE NOTE ADULT - CARE PLAN
Principal Discharge DX:	Chest pain on breathing  Goal:	resolution  Assessment and plan of treatment:	continue indomethacin  Secondary Diagnosis:	Rheumatoid arteritis  Assessment and plan of treatment:	f/u with [facility]  Secondary Diagnosis:	Pleural effusion on right  Assessment and plan of treatment:	if reaccumulates will need course of steroids  Secondary Diagnosis:	Crohn's disease with complication, unspecified gastrointestinal tract location  Assessment and plan of treatment:	continue pentasa  Secondary Diagnosis:	High cholesterol  Assessment and plan of treatment:	zocor  Secondary Diagnosis:	Other depression  Assessment and plan of treatment:	duloxetine  Secondary Diagnosis:	Alzheimer's dementia without behavioral disturbance, unspecified timing of dementia onset  Assessment and plan of treatment:	titrate seroquel

## 2018-06-22 NOTE — PROGRESS NOTE ADULT - SUBJECTIVE AND OBJECTIVE BOX
CC: Rt sided chest Pain (18 Jun 2018 06:06)    HPI: 83 y/o Female with PMHx of Crohn's disease, Depression presented with severe Right sided chest wall pain - pt was evaluated in ER day prior for same pain - pleuritic in nature, + tenderness on exam and with movements - found to have moderate right pleural effusion with atelectasis on CT chest. Pt minimally mobile as per her as she does not want to.    INTERVAL HPI/OVERNIGHT EVENTS: pain controlled, + severe depression, s/p thoracocentesis, forgetful, continue to c/o CP on and off, Other ROS reviewed and neg     Vital Signs Last 24 Hrs  T(C): 36.9 (22 Jun 2018 09:06), Max: 36.9 (22 Jun 2018 09:06)  T(F): 98.4 (22 Jun 2018 09:06), Max: 98.4 (22 Jun 2018 09:06)  HR: 80 (22 Jun 2018 09:06) (70 - 80)  BP: 148/88 (22 Jun 2018 09:06) (141/63 - 148/88)  RR: 18 (22 Jun 2018 09:06) (18 - 18)  SpO2: 94% (22 Jun 2018 00:35) (94% - 98%)                        11.5   8.7   )-----------( 321      ( 21 Jun 2018 07:55 )             37.0     21 Jun 2018 07:55    140    |  98     |  20.0   ----------------------------<  103    4.9     |  27.0   |  0.68     Ca    9.4        21 Jun 2018 07:55    MEDICATIONS  (STANDING):  azithromycin   Tablet 500 milliGRAM(s) Oral daily  cefTRIAXone   IVPB 2 Gram(s) IV Intermittent every 24 hours  cefTRIAXone   IVPB      DULoxetine 20 milliGRAM(s) Oral daily  enoxaparin Injectable 40 milliGRAM(s) SubCutaneous every 24 hours  indomethacin 25 milliGRAM(s) Oral two times a day  mesalamine DR Capsule 400 milliGRAM(s) Oral three times a day  oxybutynin 5 milliGRAM(s) Oral two times a day  pantoprazole    Tablet 40 milliGRAM(s) Oral before breakfast  saccharomyces boulardii 250 milliGRAM(s) Oral two times a day    MEDICATIONS  (PRN):  HYDROmorphone  Injectable 0.5 milliGRAM(s) IV Push every 4 hours PRN Severe Pain (7 - 10)  ondansetron Injectable 4 milliGRAM(s) IV Push every 4 hours PRN Nausea and/or Vomiting  oxyCODONE    IR 5 milliGRAM(s) Oral every 4 hours PRN Mild Pain (1 - 3)  oxyCODONE    IR 10 milliGRAM(s) Oral every 4 hours PRN Moderate Pain (4 - 6)  zolpidem 5 milliGRAM(s) Oral at bedtime PRN Insomnia    RADIOLOGY & ADDITIONAL TESTS: perosnally visualized    PHYSICAL EXAM:    General: elderly female in no acute distress  Eyes: PERRLA, EOMI; conjunctiva and sclera clear  Head: Normocephalic; atraumatic  ENMT: No nasal discharge; airway clear  Neck: Supple; non tender; no masses  Respiratory: decreased BS Bibasilarly R>L with bibasilar crackles, + tenderness on palpation of right chest  Cardiovascular: Regular rate and rhythm. S1 and S2  Gastrointestinal: Soft non-tender non-distended; Normal bowel sounds  Genitourinary: No costovertebral angle tenderness  Extremities: Normal range of motion, No clubbing, cyanosis or edema  Vascular: Peripheral pulses palpable 2+ bilaterally  Neurological: Alert and oriented x4  Skin: Warm and dry.   Musculoskeletal: Normal tone, without deformities  Psychiatric: depressed

## 2018-06-22 NOTE — DISCHARGE NOTE ADULT - MEDICATION SUMMARY - MEDICATIONS TO TAKE
I will START or STAY ON the medications listed below when I get home from the hospital:    mesalamine 400 mg oral delayed release capsule  -- 1 cap(s) by mouth 3 times a day  -- Indication: For Crohn's dx    indomethacin 25 mg oral capsule  -- 1 cap(s) by mouth 2 times a day  -- Indication: For RA    DULoxetine 20 mg oral delayed release capsule  -- 1 cap(s) by mouth once a day  -- Indication: For Depression    simvastatin 20 mg oral tablet  -- 1 tab(s) by mouth once a day (at bedtime)  -- Indication: For HLD    QUEtiapine  -- 12.5 milligram(s) by mouth once a day (at bedtime)  -- Indication: For Dementia    zolpidem 5 mg oral tablet  -- 1 tab(s) by mouth once a day (at bedtime), As needed, Insomnia  -- Indication: For Insomnia    Vantin 200 mg oral tablet  -- 1 tab(s) by mouth every 12 hours - 10 days course  -- Indication: For Pleural effusion on right    saccharomyces boulardii lyo 250 mg oral capsule  -- 1 cap(s) by mouth 2 times a day  -- Indication: For Supplement    pantoprazole 40 mg oral delayed release tablet  -- 1 tab(s) by mouth once a day (before a meal)  -- Indication: For GI     Toviaz 4 mg oral tablet, extended release  -- 1 tab(s) by mouth once a day  -- Indication: For  I will START or STAY ON the medications listed below when I get home from the hospital:    mesalamine 400 mg oral delayed release capsule  -- 1 cap(s) by mouth 3 times a day  -- Indication: For Crohn's dx    indomethacin 25 mg oral capsule  -- 1 cap(s) by mouth 2 times a day  -- Indication: For RA    DULoxetine 20 mg oral delayed release capsule  -- 1 cap(s) by mouth once a day  -- Indication: For Depression    simvastatin 20 mg oral tablet  -- 1 tab(s) by mouth once a day (at bedtime)  -- Indication: For HLD    QUEtiapine 25 mg oral tablet  -- 1 tab(s) by mouth once a day (at bedtime)  -- Indication: For Dementia    zolpidem 5 mg oral tablet  -- 1 tab(s) by mouth once a day (at bedtime), As needed, Insomnia  -- Indication: For Insomnia    amLODIPine 5 mg oral tablet  -- 1 tab(s) by mouth once a day  -- Indication: For HTN    Vantin 200 mg oral tablet  -- 1 tab(s) by mouth every 12 hours - 10 days course  -- Indication: For Pleural effusion on right    saccharomyces boulardii lyo 250 mg oral capsule  -- 1 cap(s) by mouth 2 times a day  -- Indication: For Supplement    pantoprazole 40 mg oral delayed release tablet  -- 1 tab(s) by mouth once a day (before a meal)  -- Indication: For GI     Toviaz 4 mg oral tablet, extended release  -- 1 tab(s) by mouth once a day  -- Indication: For

## 2018-06-22 NOTE — DISCHARGE NOTE ADULT - PLAN OF CARE
zocor duloxetine titrate seroquel resolution continue indomethacin f/u with Hel[facility] if reaccumulates will need course of steroids continue pentasa

## 2018-06-22 NOTE — PROGRESS NOTE ADULT - ASSESSMENT
81y/o woman with PMH of major depression, RA, Crohn's disease and cardiac disease s/p pace maker, was admitted on 6/16 with R sided pleuritic chest pain and was found to have a large pleural effusion possibly related to RA.  procalcitonin is not high. fluid culture is negative so far, LDH is high but unlikely that it is infectious since she is clinically stable with no fever and leukocytosis.    Blood culture from admission grew 1/2 strep viridans.     1-Bacteremia:  -cont ceftriaxine 2gm daily while in the hospital  -repeat blood culture from 6/19 so far negative   -TTE negative for vegetation   -Last day of treatment for bacteremia would be 7/2/18, but since there is strong possibility of contamination, can be swtiched to oral cefpodoxime to completer the course.     2-Pleural effusion:  -Questionable underlying pneumonia since procalcitonin is not high but at the same time based on LDH level it is exudate that can happen with RA too.   -3rd gen cephalosporin covers pneumonia even though fluid culture neg.   -Rheumatology follow up for possible serositis

## 2018-06-22 NOTE — PROGRESS NOTE ADULT - PROBLEM SELECTOR PLAN 1
on right, pleuritic etiology with no fever, negative procalcitonin and mildly elevated WBC - moderate pleural effusion on CT noted - as per CTS - IR for diagnostic and therapeutic thoracocentesis done 6/19 with 500 cc of clear yellow fluid drained, started on IV abx as BCx was positive, but fluid is more consistent with rheumatoid effusion - flud Cx and repeat BCx are negative, discussed with ID - will change to po vantin to complete 2 weeks course  - rheumatology evaluation appreciated, noted elevated ESR/CRP, continue indomethacin with PPI prophylaxis, if fluid reaccumulate will need po prednisone course

## 2018-06-22 NOTE — PROGRESS NOTE ADULT - ASSESSMENT
81 y/o Female with PMHx of Crohn's disease, Depression, Cardiac Dx s/p PPM presented with severe Right sided chest wall pain - pt was evaluated in ER day prior for same pain - pleuritic in nature, + tenderness on exam and with movements - found to have moderate right pleural effusion with atelectasis on CT chest. Pt minimally mobile as per her as she does not want to as she extremely depressed: "I want it all to be over". Underwent thoracocentesis on 6/19 with 500cc of clear yellow fluid under local anesthesia with significant improvement on CXR and clinically

## 2018-06-22 NOTE — PROGRESS NOTE BEHAVIORAL HEALTH - RISK ASSESSMENT
Low -Given patient's confusion.  Patient has not been aggressive or agitated, with no known h/o suicide attempts, currently denying any S/H I/I/P and has been compliant with treatment.
Low -Given patient's confusion.  Patient has not been aggressive or agitated, with no known h/o suicide attempts, currently denying any S/H I/I/P and has been compliant with treatment.

## 2018-06-22 NOTE — PROGRESS NOTE ADULT - ATTENDING COMMENTS
Full code  Hx of RA - f/u with outpatient rheumatologist Dr. Leija   CXR reviewed - improved Right pleural effusion  EKG reviewed - V-paced, will start po seroquel at night with recommendation to titrate to effect  DC planning when [facility] arranged

## 2018-06-22 NOTE — PROGRESS NOTE ADULT - SUBJECTIVE AND OBJECTIVE BOX
[facility]  INFECTIOUS DISEASES AND INTERNAL MEDICINE at [place]  =======================================================  Nathan Campbell MD  Diplomates American Board of Internal Medicine and Infectious Diseases  =======================================================    KARSTEN HALE [id number]    Follow up:    81y/o woman with PMH of  major depression, RA, Crohn's disease and cardiac disease s/p pace maker was admitted on 6/16 with R sided pleuritic chest pain and was found to have a large pleural effusion.   s/p 500cc therapeutic and diagnostic thoracostenosis, with yellow fluid removed.    Blood culture from admission grew 1/2 strep viridans.   All cultures remain neg and she is comfortable.      PAST MEDICAL & SURGICAL HISTORY:  Depression  Cardiac disease: pacemaker 2007  High cholesterol  Rheumatoid arteritis  Crohn disease  Pacemaker    Social Hx: no smoking, or drinking habits    FAMILY HISTORY:  No pertinent family history in first degree relatives    Allergies  sulfa drugs (Anaphylaxis)    Antibiotics:  None     REVIEW OF SYSTEMS:  CONSTITUTIONAL:  No Fever or chills  HEENT:  No diplopia or blurred vision.  No earache, sore throat or runny nose.  CARDIOVASCULAR:  No chest pain or SOB.  RESPIRATORY:  No cough, +shortness of breath, no PND or orthopnea.  GASTROINTESTINAL:  No nausea, vomiting or diarrhea.  GENITOURINARY:  No dysuria, frequency or urgency. No Blood in urine  MUSCULOSKELETAL:  no joint aches, no muscle pain  SKIN:  No change in skin, hair or nails.  NEUROLOGIC:  No paresthesias, fasciculations, seizures or weakness.  PSYCHIATRIC:  depressed mood   ENDOCRINE:  No heat or cold intolerance, polyuria or polydipsia.  HEMATOLOGICAL:  No easy bruising or bleeding.     Physical Exam:  Vital Signs Last 24 Hrs  T(C): 36.9 (22 Jun 2018 09:06), Max: 36.9 (22 Jun 2018 09:06)  T(F): 98.4 (22 Jun 2018 09:06), Max: 98.4 (22 Jun 2018 09:06)  HR: 80 (22 Jun 2018 09:06) (70 - 80)  BP: 148/88 (22 Jun 2018 09:06) (141/63 - 148/88)  BP(mean): --  RR: 18 (22 Jun 2018 09:06) (18 - 18)  SpO2: 94% (22 Jun 2018 00:35) (94% - 98%)  GEN: NAD  HEENT: normocephalic and atraumatic. EOMI. PERRL.    NECK: Supple.  No lymphadenopathy   LUNGS: decreased BS in R lower half of lung.   HEART: Regular rate and rhythm without murmur.  ABDOMEN: Soft, nontender, and nondistended.  Positive bowel sounds.    : No CVA tenderness  EXTREMITIES: Without any cyanosis, clubbing, rash, lesions or edema.  NEUROLOGIC: grossly intact.  PSYCHIATRIC: depressed mood, doesn't talk much, eyes closed during interview   SKIN: No ulceration or induration present.    Labs:  06-21    140  |  98  |  20.0  ----------------------------<  103  4.9   |  27.0  |  0.68    Ca    9.4      21 Jun 2018 07:55                        11.5   8.7   )-----------( 321      ( 21 Jun 2018 07:55 )             37.0     RECENT CULTURES:  06-19 @ 17:05 .Blood Blood     No growth at 48 hours    06-19 @ 12:50 .Body Fluid Pleural Fluid     No growth at 3 days.  Culture in progress    Few White blood cells  No organisms seen    06-18 @ 07:03 .Blood Blood     No growth at 48 hours    06-18 @ 07:01 .Blood Blood Streptococcus mutans  Blood Culture PCR    Growth in aerobic bottle: Streptococcus mutans  Aerobic Bottle: 22:54 Hours to positivity  Anaerobic Bottle: No growth to date    All imaging and data are reviewed.

## 2018-06-22 NOTE — PROGRESS NOTE BEHAVIORAL HEALTH - NSBHFUPINTERVALHXFT_PSY_A_CORE
Patient is A&Ox2 with improved attention, memory and awareness of surrounding. She denies S/H/I/I/I/P A/V/H, and does not appear to be responding to internal stimuli. RN reports patient is in good behavioral control and cooperating with treatment. Patient is aware she will be discharged to [facility] for [facility] and reports she is in agreement with plan.   Received collateral from spouse yesterday who reports patient short term memory has been declining for several years. At baseline, patient forgets conversations that occurred 10  minutes prior. Spouse reports patient is more confused since admission to [facility] which he attributes to her pain medication.  Patient is wheel chair bound  x 3 years and requires assistance with ADL's.  She is prescribed Cymbalta by her outpatient neurologist. Patient had previous depressive episode > 15 years ago and received antidepressant from her PCP. Patient has no prior suicide attempts, substance abuse or inpatient psychiatric hospitalizations. Spouse denies patient has recently vervbalized depressed mood or suicidal ideation and does not feel she is a suicide risk. Patient's  works full time and has difficulty caring for his wife and has been seeking long term placement.
Patient is A&Ox1 with waxing and waning attention, memory and awareness of surrounding. She denies S/H/I/I/I/P A/V/H, and does not appear to be responding to internal stimuli. RN reports patient is intermittently irritable and needs frequent redirection.   Received collateral from spouse who reports patient short term memory has been declining for several years. At baseline, patient forgets conversations that occurred 10  minutes prior. Spouse reports patient is more confused since admission to [facility] which he attributes to her pain medication.  Patient is wheel chair bound  x 3 years and requires assistance with ADL's.  She is prescribed Cymbalta by her outpatient neurologist. Patient had previous depressive episode > 15 years ago and received antidepressant from her PCP. Patient has no prior suicide attempts, substance abuse or inpatient psychiatric hospitalizations. Spouse denies patient has recently vervbalized depressed mood or suicidal ideation and does not feel she is a suicide risk. Patient's  works full time and has difficulty caring for his wife and has been seeking long term placement. Patient was recently discharge from [facility].

## 2018-06-22 NOTE — DISCHARGE NOTE ADULT - SECONDARY DIAGNOSIS.
Other depression Alzheimer's dementia without behavioral disturbance, unspecified timing of dementia onset Rheumatoid arteritis Pleural effusion on right Crohn's disease with complication, unspecified gastrointestinal tract location High cholesterol

## 2018-06-22 NOTE — PROGRESS NOTE BEHAVIORAL HEALTH - SUMMARY
Summary: Patient is an 82 year old, female; domiciled with ; with past psychiatric history of depression, not currently following psychiatrist or therapist; no known past psychiatric  hospitalizations; no known suicide attempts; no known history of violence or arrests; no active substance abuse or known history of complicated withdrawal;  PMHx of Crohn's disease,  Cardiac Dx s/p PPM presented with severe Right sided chest wall pain,. pleuretic in nature.  Patient found to have moderate right pleural effusion with atelectasis on CT chest. Pt made a statement to the effect that she minimally mobile she does not want to as she extremely depressed: "I want it all to be over". Asked to evaluate for depressive sx's and possible suicidal ideation .  Patient with current delirium superimposed on likely dementia.  She is intermittently confused, perseverative with fluctuating attention, distractibility.  She scored a 10/30 on MMSE.  She denies any current S/H I/I/P and is not a reliable historian.  No psychotic sx's were elicited.    Passive suicidal statements are likely made in context of confusion and not an indicator of acute dangerousness.  Will continue to evaluate patient's mental status.   Collateral from  supports patient is not an imminent suicide  risk and likely has delirium superimposed on dementia. Patient does not require inpatient psychiatric hospitalization at this time and would benefit from [facility] when medically cleared.   Recommend  Repeat EKG if QTC is < 490 consider standing Seroquel 12.5 mg hs which may decrease anxiety, help delirium and sleep  Consider Haldol 0.5mg IM q6h prn severe agitation.
Summary: Patient is an 82 year old, female; domiciled with ; with past psychiatric history of depression, not currently following psychiatrist or therapist; no known past psychiatric  hospitalizations; no known suicide attempts; no known history of violence or arrests; no active substance abuse or known history of complicated withdrawal;  PMHx of Crohn's disease,  Cardiac Dx s/p PPM presented with severe Right sided chest wall pain,. pleuretic in nature.  Patient found to have moderate right pleural effusion with atelectasis on CT chest. Pt made a statement to the effect that she minimally mobile she does not want to as she extremely depressed: "I want it all to be over". Asked to evaluate for depressive sx's and possible suicidal ideation .  Patient with current delirium superimposed on likely dementia.  She is intermittently confused, perseverative with fluctuating attention, distractibility.  She scored a 10/30 on MMSE.  She denies any current S/H I/I/P and is not a reliable historian.  No psychotic sx's were elicited.    Passive suicidal statements are likely made in context of confusion and not an indicator of acute dangerousness.  Will continue to evaluate patient's mental status.   Collateral from  supports patient is not an imminent suicide  risk and likely has delirium superimposed on dementia. Patient does not require inpatient psychiatric hospitalization at this time and would benefit from [facility] when medically cleared.   Recommend  Patient should be followed by psychiatry at [facility]  continue low dose Cymbalta.

## 2018-06-22 NOTE — DISCHARGE NOTE ADULT - PATIENT PORTAL LINK FT
You can access the [facility] Patient Portal, offered by [facility], by registering with the following website: [email or web address]

## 2018-06-22 NOTE — DISCHARGE NOTE ADULT - MEDICATION SUMMARY - MEDICATIONS TO CHANGE
I will SWITCH the dose or number of times a day I take the medications listed below when I get home from the hospital:    Cymbalta  --  by mouth    Apriso  --  by mouth    simvastatin  --  by mouth    Toviaz  --  by mouth    Ambien  --  by mouth    omeprazole  --  by mouth

## 2018-06-23 LAB
CULTURE RESULTS: SIGNIFICANT CHANGE UP
SPECIMEN SOURCE: SIGNIFICANT CHANGE UP

## 2018-06-23 PROCEDURE — 99232 SBSQ HOSP IP/OBS MODERATE 35: CPT | Mod: GC

## 2018-06-23 RX ORDER — CEFPODOXIME PROXETIL 100 MG
1 TABLET ORAL
Qty: 0 | Refills: 0 | COMMUNITY
Start: 2018-06-23 | End: 2018-07-01

## 2018-06-23 RX ADMIN — Medication 250 MILLIGRAM(S): at 06:16

## 2018-06-23 RX ADMIN — ENOXAPARIN SODIUM 40 MILLIGRAM(S): 100 INJECTION SUBCUTANEOUS at 12:12

## 2018-06-23 RX ADMIN — Medication 250 MILLIGRAM(S): at 17:54

## 2018-06-23 RX ADMIN — Medication 200 MILLIGRAM(S): at 06:15

## 2018-06-23 RX ADMIN — Medication 25 MILLIGRAM(S): at 06:16

## 2018-06-23 RX ADMIN — Medication 400 MILLIGRAM(S): at 21:44

## 2018-06-23 RX ADMIN — AZITHROMYCIN 500 MILLIGRAM(S): 500 TABLET, FILM COATED ORAL at 12:12

## 2018-06-23 RX ADMIN — Medication 25 MILLIGRAM(S): at 17:54

## 2018-06-23 RX ADMIN — Medication 5 MILLIGRAM(S): at 17:54

## 2018-06-23 RX ADMIN — OXYCODONE HYDROCHLORIDE 10 MILLIGRAM(S): 5 TABLET ORAL at 06:49

## 2018-06-23 RX ADMIN — OXYCODONE HYDROCHLORIDE 10 MILLIGRAM(S): 5 TABLET ORAL at 06:20

## 2018-06-23 RX ADMIN — PANTOPRAZOLE SODIUM 40 MILLIGRAM(S): 20 TABLET, DELAYED RELEASE ORAL at 06:16

## 2018-06-23 RX ADMIN — Medication 400 MILLIGRAM(S): at 13:54

## 2018-06-23 RX ADMIN — QUETIAPINE FUMARATE 12.5 MILLIGRAM(S): 200 TABLET, FILM COATED ORAL at 21:44

## 2018-06-23 RX ADMIN — Medication 5 MILLIGRAM(S): at 06:16

## 2018-06-23 RX ADMIN — Medication 400 MILLIGRAM(S): at 06:16

## 2018-06-23 RX ADMIN — CEFTRIAXONE 100 GRAM(S): 500 INJECTION, POWDER, FOR SOLUTION INTRAMUSCULAR; INTRAVENOUS at 15:19

## 2018-06-23 RX ADMIN — DULOXETINE HYDROCHLORIDE 20 MILLIGRAM(S): 30 CAPSULE, DELAYED RELEASE ORAL at 12:12

## 2018-06-23 RX ADMIN — Medication 25 MILLIGRAM(S): at 06:46

## 2018-06-23 NOTE — PROGRESS NOTE ADULT - SUBJECTIVE AND OBJECTIVE BOX
CC: Rt sided chest Pain (18 Jun 2018 06:06)    HPI: 83 y/o Female with PMHx of Crohn's disease, Depression presented with severe Right sided chest wall pain - pt was evaluated in ER day prior for same pain - pleuritic in nature, + tenderness on exam and with movements - found to have moderate right pleural effusion with atelectasis on CT chest. Pt minimally mobile as per her as she does not want to.    INTERVAL HPI/OVERNIGHT EVENTS: pain controlled, + severe depression, s/p thoracocentesis, forgetful, continue to c/o CP on and off, Other ROS reviewed and neg   Pt is awaiting insurance authorization for DC to rehab    Vital Signs Last 24 Hrs  T(C): 36.7 (23 Jun 2018 07:16), Max: 36.8 (22 Jun 2018 23:07)  T(F): 98 (23 Jun 2018 07:16), Max: 98.3 (22 Jun 2018 23:07)  HR: 76 (23 Jun 2018 07:16) (76 - 92)  BP: 150/90 (23 Jun 2018 07:16) (127/75 - 150/90)  RR: 18 (23 Jun 2018 07:16) (18 - 18)  SpO2: 97% (22 Jun 2018 23:07) (97% - 97%)    MEDICATIONS  (STANDING):  azithromycin   Tablet 500 milliGRAM(s) Oral daily  cefTRIAXone   IVPB 2 Gram(s) IV Intermittent every 24 hours  cefTRIAXone   IVPB      DULoxetine 20 milliGRAM(s) Oral daily  enoxaparin Injectable 40 milliGRAM(s) SubCutaneous every 24 hours  indomethacin 25 milliGRAM(s) Oral two times a day  mesalamine DR Capsule 400 milliGRAM(s) Oral three times a day  oxybutynin 5 milliGRAM(s) Oral two times a day  pantoprazole    Tablet 40 milliGRAM(s) Oral before breakfast  saccharomyces boulardii 250 milliGRAM(s) Oral two times a day    MEDICATIONS  (PRN):  HYDROmorphone  Injectable 0.5 milliGRAM(s) IV Push every 4 hours PRN Severe Pain (7 - 10)  ondansetron Injectable 4 milliGRAM(s) IV Push every 4 hours PRN Nausea and/or Vomiting  oxyCODONE    IR 5 milliGRAM(s) Oral every 4 hours PRN Mild Pain (1 - 3)  oxyCODONE    IR 10 milliGRAM(s) Oral every 4 hours PRN Moderate Pain (4 - 6)  zolpidem 5 milliGRAM(s) Oral at bedtime PRN Insomnia    RADIOLOGY & ADDITIONAL TESTS: perosnally visualized    PHYSICAL EXAM:    General: elderly female in no acute distress  Eyes: PERRLA, EOMI; conjunctiva and sclera clear  Head: Normocephalic; atraumatic  ENMT: No nasal discharge; airway clear  Neck: Supple; non tender; no masses  Respiratory: decreased BS Bibasilarly R>L with bibasilar crackles, + tenderness on palpation of right chest  Cardiovascular: Regular rate and rhythm. S1 and S2  Gastrointestinal: Soft non-tender non-distended; Normal bowel sounds  Genitourinary: No costovertebral angle tenderness  Extremities: Normal range of motion, No clubbing, cyanosis or edema  Vascular: Peripheral pulses palpable 2+ bilaterally  Neurological: Alert and oriented x4  Skin: Warm and dry.   Musculoskeletal: Normal tone, without deformities  Psychiatric: depressed

## 2018-06-23 NOTE — PROGRESS NOTE ADULT - ASSESSMENT
81 y/o Female with PMHx of Crohn's disease, Depression, Cardiac Dx s/p PPM presented with severe Right sided chest wall pain - pt was evaluated in ER day prior for same pain - pleuritic in nature, + tenderness on exam and with movements - found to have moderate right pleural effusion with atelectasis on CT chest. Pt minimally mobile as per her as she does not want to as she extremely depressed: "I want it all to be over". Underwent thoracocentesis on 6/19 with 500cc of clear yellow fluid under local anesthesia with significant improvement on CXR and clinically. Awaiting insurance authorization for DC to rehab.

## 2018-06-24 LAB
CULTURE RESULTS: SIGNIFICANT CHANGE UP
HCT VFR BLD CALC: 39.5 % — SIGNIFICANT CHANGE UP (ref 37–47)
HGB BLD-MCNC: 12.9 G/DL — SIGNIFICANT CHANGE UP (ref 12–16)
MCHC RBC-ENTMCNC: 30.3 PG — SIGNIFICANT CHANGE UP (ref 27–31)
MCHC RBC-ENTMCNC: 32.7 G/DL — SIGNIFICANT CHANGE UP (ref 32–36)
MCV RBC AUTO: 92.7 FL — SIGNIFICANT CHANGE UP (ref 81–99)
ORGANISM # SPEC MICROSCOPIC CNT: SIGNIFICANT CHANGE UP
PLATELET # BLD AUTO: 382 K/UL — SIGNIFICANT CHANGE UP (ref 150–400)
RBC # BLD: 4.26 M/UL — LOW (ref 4.4–5.2)
RBC # FLD: 13.8 % — SIGNIFICANT CHANGE UP (ref 11–15.6)
SPECIMEN SOURCE: SIGNIFICANT CHANGE UP
WBC # BLD: 11.1 K/UL — HIGH (ref 4.8–10.8)
WBC # FLD AUTO: 11.1 K/UL — HIGH (ref 4.8–10.8)

## 2018-06-24 PROCEDURE — 99232 SBSQ HOSP IP/OBS MODERATE 35: CPT | Mod: GC

## 2018-06-24 RX ORDER — QUETIAPINE FUMARATE 200 MG/1
25 TABLET, FILM COATED ORAL AT BEDTIME
Qty: 0 | Refills: 0 | Status: DISCONTINUED | OUTPATIENT
Start: 2018-06-24 | End: 2018-06-26

## 2018-06-24 RX ADMIN — ZOLPIDEM TARTRATE 5 MILLIGRAM(S): 10 TABLET ORAL at 21:04

## 2018-06-24 RX ADMIN — ENOXAPARIN SODIUM 40 MILLIGRAM(S): 100 INJECTION SUBCUTANEOUS at 12:33

## 2018-06-24 RX ADMIN — DULOXETINE HYDROCHLORIDE 20 MILLIGRAM(S): 30 CAPSULE, DELAYED RELEASE ORAL at 12:32

## 2018-06-24 RX ADMIN — CEFTRIAXONE 100 GRAM(S): 500 INJECTION, POWDER, FOR SOLUTION INTRAMUSCULAR; INTRAVENOUS at 16:20

## 2018-06-24 RX ADMIN — Medication 400 MILLIGRAM(S): at 21:04

## 2018-06-24 RX ADMIN — Medication 25 MILLIGRAM(S): at 17:53

## 2018-06-24 RX ADMIN — PANTOPRAZOLE SODIUM 40 MILLIGRAM(S): 20 TABLET, DELAYED RELEASE ORAL at 05:08

## 2018-06-24 RX ADMIN — QUETIAPINE FUMARATE 25 MILLIGRAM(S): 200 TABLET, FILM COATED ORAL at 21:04

## 2018-06-24 RX ADMIN — Medication 25 MILLIGRAM(S): at 06:07

## 2018-06-24 RX ADMIN — Medication 5 MILLIGRAM(S): at 05:08

## 2018-06-24 RX ADMIN — Medication 250 MILLIGRAM(S): at 17:53

## 2018-06-24 RX ADMIN — Medication 25 MILLIGRAM(S): at 05:07

## 2018-06-24 RX ADMIN — OXYCODONE HYDROCHLORIDE 10 MILLIGRAM(S): 5 TABLET ORAL at 21:30

## 2018-06-24 RX ADMIN — AZITHROMYCIN 500 MILLIGRAM(S): 500 TABLET, FILM COATED ORAL at 12:32

## 2018-06-24 RX ADMIN — OXYCODONE HYDROCHLORIDE 10 MILLIGRAM(S): 5 TABLET ORAL at 21:04

## 2018-06-24 RX ADMIN — Medication 400 MILLIGRAM(S): at 05:07

## 2018-06-24 RX ADMIN — Medication 400 MILLIGRAM(S): at 13:52

## 2018-06-24 RX ADMIN — Medication 5 MILLIGRAM(S): at 17:53

## 2018-06-24 RX ADMIN — Medication 250 MILLIGRAM(S): at 05:07

## 2018-06-24 NOTE — PROGRESS NOTE ADULT - SUBJECTIVE AND OBJECTIVE BOX
CC: Rt sided chest Pain (18 Jun 2018 06:06)    HPI: 83 y/o Female with PMHx of Crohn's disease, Depression presented with severe Right sided chest wall pain - pt was evaluated in ER day prior for same pain - pleuritic in nature, + tenderness on exam and with movements - found to have moderate right pleural effusion with atelectasis on CT chest. Pt minimally mobile as per her as she does not want to.    INTERVAL HPI/OVERNIGHT EVENTS: pain controlled, + severe depression, s/p thoracocentesis, forgetful, continue to c/o CP on and off, Periodically more confused and agitated  Other ROS reviewed and neg   Pt is awaiting insurance authorization for DC to rehab    Vital Signs Last 24 Hrs  T(C): 36.7 (24 Jun 2018 16:11), Max: 36.7 (24 Jun 2018 07:41)  T(F): 98.1 (24 Jun 2018 16:11), Max: 98.1 (24 Jun 2018 16:11)  HR: 91 (24 Jun 2018 16:11) (88 - 106)  BP: 138/80 (24 Jun 2018 16:11) (138/80 - 155/87)  RR: 18 (24 Jun 2018 16:11) (18 - 19)  SpO2: 98% (24 Jun 2018 16:11) (96% - 98%)               12.9   11.1  )-----------( 382      ( 24 Jun 2018 07:10 )             39.5     MEDICATIONS  (STANDING):  azithromycin   Tablet 500 milliGRAM(s) Oral daily  cefTRIAXone   IVPB 2 Gram(s) IV Intermittent every 24 hours  cefTRIAXone   IVPB      DULoxetine 20 milliGRAM(s) Oral daily  enoxaparin Injectable 40 milliGRAM(s) SubCutaneous every 24 hours  indomethacin 25 milliGRAM(s) Oral two times a day  mesalamine DR Capsule 400 milliGRAM(s) Oral three times a day  oxybutynin 5 milliGRAM(s) Oral two times a day  pantoprazole    Tablet 40 milliGRAM(s) Oral before breakfast  saccharomyces boulardii 250 milliGRAM(s) Oral two times a day    MEDICATIONS  (PRN):  HYDROmorphone  Injectable 0.5 milliGRAM(s) IV Push every 4 hours PRN Severe Pain (7 - 10)  ondansetron Injectable 4 milliGRAM(s) IV Push every 4 hours PRN Nausea and/or Vomiting  oxyCODONE    IR 5 milliGRAM(s) Oral every 4 hours PRN Mild Pain (1 - 3)  oxyCODONE    IR 10 milliGRAM(s) Oral every 4 hours PRN Moderate Pain (4 - 6)  zolpidem 5 milliGRAM(s) Oral at bedtime PRN Insomnia    RADIOLOGY & ADDITIONAL TESTS: perosnally visualized    PHYSICAL EXAM:    General: elderly female in no acute distress  Eyes: PERRLA, EOMI; conjunctiva and sclera clear  Head: Normocephalic; atraumatic  ENMT: No nasal discharge; airway clear  Neck: Supple; non tender; no masses  Respiratory: decreased BS Bibasilarly R>L with bibasilar crackles, + tenderness on palpation of right chest  Cardiovascular: Regular rate and rhythm. S1 and S2  Gastrointestinal: Soft non-tender non-distended; Normal bowel sounds  Genitourinary: No costovertebral angle tenderness  Extremities: Normal range of motion, No clubbing, cyanosis or edema  Vascular: Peripheral pulses palpable 2+ bilaterally  Neurological: Alert and oriented x4  Skin: Warm and dry.   Musculoskeletal: Normal tone, without deformities  Psychiatric: depressed

## 2018-06-24 NOTE — PROGRESS NOTE ADULT - ASSESSMENT
83 y/o Female with PMHx of Crohn's disease, Depression, Cardiac Dx s/p PPM presented with severe Right sided chest wall pain - pt was evaluated in ER day prior for same pain - pleuritic in nature, + tenderness on exam and with movements - found to have moderate right pleural effusion with atelectasis on CT chest. Pt minimally mobile as per her as she does not want to as she extremely depressed: "I want it all to be over". Underwent thoracocentesis on 6/19 with 500cc of clear yellow fluid under local anesthesia with significant improvement on CXR and clinically. Awaiting insurance authorization for DC to rehab.

## 2018-06-24 NOTE — PROGRESS NOTE ADULT - ATTENDING COMMENTS
Full code  Hx of RA - f/u with outpatient rheumatologist Dr. Leija   CXR reviewed - improved Right pleural effusion  EKG reviewed - V-paced, started po seroquel at night - titrate to effect  DC planning when [facility] arranged

## 2018-06-25 PROCEDURE — 99232 SBSQ HOSP IP/OBS MODERATE 35: CPT

## 2018-06-25 RX ORDER — AMLODIPINE BESYLATE 2.5 MG/1
5 TABLET ORAL DAILY
Qty: 0 | Refills: 0 | Status: DISCONTINUED | OUTPATIENT
Start: 2018-06-25 | End: 2018-06-26

## 2018-06-25 RX ORDER — QUETIAPINE FUMARATE 200 MG/1
1 TABLET, FILM COATED ORAL
Qty: 0 | Refills: 0 | DISCHARGE
Start: 2018-06-25

## 2018-06-25 RX ORDER — AMLODIPINE BESYLATE 2.5 MG/1
1 TABLET ORAL
Qty: 0 | Refills: 0 | DISCHARGE
Start: 2018-06-25

## 2018-06-25 RX ADMIN — Medication 25 MILLIGRAM(S): at 07:26

## 2018-06-25 RX ADMIN — Medication 5 MILLIGRAM(S): at 06:26

## 2018-06-25 RX ADMIN — Medication 25 MILLIGRAM(S): at 07:00

## 2018-06-25 RX ADMIN — Medication 250 MILLIGRAM(S): at 06:26

## 2018-06-25 RX ADMIN — ENOXAPARIN SODIUM 40 MILLIGRAM(S): 100 INJECTION SUBCUTANEOUS at 11:10

## 2018-06-25 RX ADMIN — AZITHROMYCIN 500 MILLIGRAM(S): 500 TABLET, FILM COATED ORAL at 11:10

## 2018-06-25 RX ADMIN — OXYCODONE HYDROCHLORIDE 10 MILLIGRAM(S): 5 TABLET ORAL at 07:00

## 2018-06-25 RX ADMIN — Medication 25 MILLIGRAM(S): at 06:26

## 2018-06-25 RX ADMIN — DULOXETINE HYDROCHLORIDE 20 MILLIGRAM(S): 30 CAPSULE, DELAYED RELEASE ORAL at 11:10

## 2018-06-25 RX ADMIN — HYDROMORPHONE HYDROCHLORIDE 0.25 MILLIGRAM(S): 2 INJECTION INTRAMUSCULAR; INTRAVENOUS; SUBCUTANEOUS at 07:26

## 2018-06-25 RX ADMIN — OXYCODONE HYDROCHLORIDE 10 MILLIGRAM(S): 5 TABLET ORAL at 06:35

## 2018-06-25 RX ADMIN — Medication 5 MILLIGRAM(S): at 17:01

## 2018-06-25 RX ADMIN — CEFTRIAXONE 100 GRAM(S): 500 INJECTION, POWDER, FOR SOLUTION INTRAMUSCULAR; INTRAVENOUS at 15:23

## 2018-06-25 RX ADMIN — Medication 250 MILLIGRAM(S): at 17:01

## 2018-06-25 RX ADMIN — Medication 400 MILLIGRAM(S): at 15:23

## 2018-06-25 RX ADMIN — PANTOPRAZOLE SODIUM 40 MILLIGRAM(S): 20 TABLET, DELAYED RELEASE ORAL at 06:26

## 2018-06-25 RX ADMIN — Medication 25 MILLIGRAM(S): at 17:02

## 2018-06-25 RX ADMIN — Medication 400 MILLIGRAM(S): at 22:34

## 2018-06-25 RX ADMIN — AMLODIPINE BESYLATE 5 MILLIGRAM(S): 2.5 TABLET ORAL at 10:41

## 2018-06-25 RX ADMIN — QUETIAPINE FUMARATE 25 MILLIGRAM(S): 200 TABLET, FILM COATED ORAL at 22:34

## 2018-06-25 RX ADMIN — Medication 400 MILLIGRAM(S): at 06:26

## 2018-06-25 RX ADMIN — Medication 25 MILLIGRAM(S): at 17:00

## 2018-06-25 NOTE — DIETITIAN INITIAL EVALUATION ADULT. - PROBLEM SELECTOR PLAN 3
- likely reactive from pain  - wait for CTA chest results  - Hold off on Abx, as Pt afebrile and stable  - check serum Procalcitonin levels  - repeat CBC In am

## 2018-06-25 NOTE — DIETITIAN INITIAL EVALUATION ADULT. - PROBLEM SELECTOR PLAN 4
- continue Home medication. We don't have Apriso in Hospital, and so started pentasa while she is in Hospital.  - currently not in flare

## 2018-06-25 NOTE — PROGRESS NOTE ADULT - SUBJECTIVE AND OBJECTIVE BOX
CC: Rt sided chest Pain (18 Jun 2018 06:06)    HPI: 81 y/o Female with PMHx of Crohn's disease, Depression presented with severe Right sided chest wall pain - pt was evaluated in ER day prior for same pain - pleuritic in nature, + tenderness on exam and with movements - found to have moderate right pleural effusion with atelectasis on CT chest. Pt minimally mobile as per her as she does not want to.    INTERVAL HPI/OVERNIGHT EVENTS: pain controlled, + severe depression, s/p thoracocentesis, forgetful, continue to c/o CP on and off, Periodically more confused and agitated  Other ROS reviewed and neg   Pt is awaiting insurance authorization for DC to rehab    Vital Signs Last 24 Hrs  T(C): 36.8 (25 Jun 2018 08:08), Max: 36.8 (25 Jun 2018 08:08)  T(F): 98.3 (25 Jun 2018 08:08), Max: 98.3 (25 Jun 2018 08:08)  HR: 89 (25 Jun 2018 08:08) (82 - 91)  BP: 169/95 (25 Jun 2018 08:08) (120/70 - 169/95)  RR: 18 (25 Jun 2018 08:08) (18 - 20)  SpO2: 97% (25 Jun 2018 08:08) (93% - 98%)                       12.9   11.1  )-----------( 382      ( 24 Jun 2018 07:10 )             39.5     MEDICATIONS  (STANDING):  amLODIPine   Tablet 5 milliGRAM(s) Oral daily  azithromycin   Tablet 500 milliGRAM(s) Oral daily  cefTRIAXone   IVPB 2 Gram(s) IV Intermittent every 24 hours  cefTRIAXone   IVPB      DULoxetine 20 milliGRAM(s) Oral daily  enoxaparin Injectable 40 milliGRAM(s) SubCutaneous every 24 hours  indomethacin 25 milliGRAM(s) Oral two times a day  mesalamine DR Capsule 400 milliGRAM(s) Oral three times a day  oxybutynin 5 milliGRAM(s) Oral two times a day  pantoprazole    Tablet 40 milliGRAM(s) Oral before breakfast  QUEtiapine 25 milliGRAM(s) Oral at bedtime  saccharomyces boulardii 250 milliGRAM(s) Oral two times a day    MEDICATIONS  (PRN):  HYDROmorphone  Injectable 0.5 milliGRAM(s) IV Push every 4 hours PRN Severe Pain (7 - 10)  ondansetron Injectable 4 milliGRAM(s) IV Push every 4 hours PRN Nausea and/or Vomiting  oxyCODONE    IR 5 milliGRAM(s) Oral every 4 hours PRN Mild Pain (1 - 3)  oxyCODONE    IR 10 milliGRAM(s) Oral every 4 hours PRN Moderate Pain (4 - 6)  zolpidem 5 milliGRAM(s) Oral at bedtime PRN Insomnia    RADIOLOGY & ADDITIONAL TESTS: perosnally visualized    PHYSICAL EXAM:    General: elderly female in no acute distress  Eyes: PERRLA, EOMI; conjunctiva and sclera clear  Head: Normocephalic; atraumatic  ENMT: No nasal discharge; airway clear  Neck: Supple; non tender; no masses  Respiratory: decreased BS Bibasilarly R>L with bibasilar crackles, + tenderness on palpation of right chest  Cardiovascular: Regular rate and rhythm. S1 and S2  Gastrointestinal: Soft non-tender non-distended; Normal bowel sounds  Genitourinary: No costovertebral angle tenderness  Extremities: Normal range of motion, No clubbing, cyanosis or edema  Vascular: Peripheral pulses palpable 2+ bilaterally  Neurological: Alert and oriented x4  Skin: Warm and dry.   Musculoskeletal: Normal tone, without deformities  Psychiatric: depressed

## 2018-06-26 VITALS
RESPIRATION RATE: 18 BRPM | OXYGEN SATURATION: 98 % | TEMPERATURE: 98 F | SYSTOLIC BLOOD PRESSURE: 124 MMHG | DIASTOLIC BLOOD PRESSURE: 84 MMHG | HEART RATE: 87 BPM

## 2018-06-26 LAB — NON-GYNECOLOGICAL CYTOLOGY STUDY: SIGNIFICANT CHANGE UP

## 2018-06-26 PROCEDURE — 82042 OTHER SOURCE ALBUMIN QUAN EA: CPT

## 2018-06-26 PROCEDURE — 76942 ECHO GUIDE FOR BIOPSY: CPT

## 2018-06-26 PROCEDURE — 88112 CYTOPATH CELL ENHANCE TECH: CPT

## 2018-06-26 PROCEDURE — 71046 X-RAY EXAM CHEST 2 VIEWS: CPT

## 2018-06-26 PROCEDURE — 71045 X-RAY EXAM CHEST 1 VIEW: CPT

## 2018-06-26 PROCEDURE — 85027 COMPLETE CBC AUTOMATED: CPT

## 2018-06-26 PROCEDURE — 87186 SC STD MICRODIL/AGAR DIL: CPT

## 2018-06-26 PROCEDURE — 87205 SMEAR GRAM STAIN: CPT

## 2018-06-26 PROCEDURE — 87075 CULTR BACTERIA EXCEPT BLOOD: CPT

## 2018-06-26 PROCEDURE — 85652 RBC SED RATE AUTOMATED: CPT

## 2018-06-26 PROCEDURE — 84155 ASSAY OF PROTEIN SERUM: CPT

## 2018-06-26 PROCEDURE — 97530 THERAPEUTIC ACTIVITIES: CPT

## 2018-06-26 PROCEDURE — 99285 EMERGENCY DEPT VISIT HI MDM: CPT | Mod: 25

## 2018-06-26 PROCEDURE — 71275 CT ANGIOGRAPHY CHEST: CPT

## 2018-06-26 PROCEDURE — 93306 TTE W/DOPPLER COMPLETE: CPT

## 2018-06-26 PROCEDURE — 84145 PROCALCITONIN (PCT): CPT

## 2018-06-26 PROCEDURE — 83986 ASSAY PH BODY FLUID NOS: CPT

## 2018-06-26 PROCEDURE — 80048 BASIC METABOLIC PNL TOTAL CA: CPT

## 2018-06-26 PROCEDURE — 88341 IMHCHEM/IMCYTCHM EA ADD ANTB: CPT

## 2018-06-26 PROCEDURE — 36415 COLL VENOUS BLD VENIPUNCTURE: CPT

## 2018-06-26 PROCEDURE — 84484 ASSAY OF TROPONIN QUANT: CPT

## 2018-06-26 PROCEDURE — 96374 THER/PROPH/DIAG INJ IV PUSH: CPT | Mod: XU

## 2018-06-26 PROCEDURE — 88305 TISSUE EXAM BY PATHOLOGIST: CPT

## 2018-06-26 PROCEDURE — 97163 PT EVAL HIGH COMPLEX 45 MIN: CPT

## 2018-06-26 PROCEDURE — 82945 GLUCOSE OTHER FLUID: CPT

## 2018-06-26 PROCEDURE — C1729: CPT

## 2018-06-26 PROCEDURE — 93005 ELECTROCARDIOGRAM TRACING: CPT

## 2018-06-26 PROCEDURE — 87070 CULTURE OTHR SPECIMN AEROBIC: CPT

## 2018-06-26 PROCEDURE — 80053 COMPREHEN METABOLIC PANEL: CPT

## 2018-06-26 PROCEDURE — 99239 HOSP IP/OBS DSCHRG MGMT >30: CPT

## 2018-06-26 PROCEDURE — 87040 BLOOD CULTURE FOR BACTERIA: CPT

## 2018-06-26 PROCEDURE — 85610 PROTHROMBIN TIME: CPT

## 2018-06-26 PROCEDURE — 84157 ASSAY OF PROTEIN OTHER: CPT

## 2018-06-26 PROCEDURE — 82550 ASSAY OF CK (CPK): CPT

## 2018-06-26 PROCEDURE — 96375 TX/PRO/DX INJ NEW DRUG ADDON: CPT | Mod: XU

## 2018-06-26 PROCEDURE — 83690 ASSAY OF LIPASE: CPT

## 2018-06-26 PROCEDURE — 89051 BODY FLUID CELL COUNT: CPT

## 2018-06-26 PROCEDURE — 86140 C-REACTIVE PROTEIN: CPT

## 2018-06-26 PROCEDURE — 83615 LACTATE (LD) (LDH) ENZYME: CPT

## 2018-06-26 PROCEDURE — 88342 IMHCHEM/IMCYTCHM 1ST ANTB: CPT

## 2018-06-26 PROCEDURE — 97116 GAIT TRAINING THERAPY: CPT

## 2018-06-26 PROCEDURE — 87150 DNA/RNA AMPLIFIED PROBE: CPT

## 2018-06-26 RX ADMIN — Medication 25 MILLIGRAM(S): at 05:55

## 2018-06-26 RX ADMIN — Medication 400 MILLIGRAM(S): at 14:44

## 2018-06-26 RX ADMIN — PANTOPRAZOLE SODIUM 40 MILLIGRAM(S): 20 TABLET, DELAYED RELEASE ORAL at 05:55

## 2018-06-26 RX ADMIN — CEFTRIAXONE 100 GRAM(S): 500 INJECTION, POWDER, FOR SOLUTION INTRAMUSCULAR; INTRAVENOUS at 14:43

## 2018-06-26 RX ADMIN — Medication 250 MILLIGRAM(S): at 05:55

## 2018-06-26 RX ADMIN — AZITHROMYCIN 500 MILLIGRAM(S): 500 TABLET, FILM COATED ORAL at 12:43

## 2018-06-26 RX ADMIN — DULOXETINE HYDROCHLORIDE 20 MILLIGRAM(S): 30 CAPSULE, DELAYED RELEASE ORAL at 12:43

## 2018-06-26 RX ADMIN — AMLODIPINE BESYLATE 5 MILLIGRAM(S): 2.5 TABLET ORAL at 05:55

## 2018-06-26 RX ADMIN — Medication 400 MILLIGRAM(S): at 05:55

## 2018-06-26 RX ADMIN — ENOXAPARIN SODIUM 40 MILLIGRAM(S): 100 INJECTION SUBCUTANEOUS at 12:43

## 2018-06-26 RX ADMIN — Medication 25 MILLIGRAM(S): at 06:55

## 2018-06-26 RX ADMIN — Medication 5 MILLIGRAM(S): at 05:57

## 2018-06-26 NOTE — PROGRESS NOTE ADULT - SUBJECTIVE AND OBJECTIVE BOX
CC: Rt sided chest Pain (18 Jun 2018 06:06)    HPI: 81 y/o Female with PMHx of Crohn's disease, Depression presented with severe Right sided chest wall pain - pt was evaluated in ER day prior for same pain - pleuritic in nature, + tenderness on exam and with movements - found to have moderate right pleural effusion with atelectasis on CT chest. Pt minimally mobile as per her as she does not want to.    INTERVAL HPI/OVERNIGHT EVENTS: pain controlled, + severe depression, s/p thoracocentesis, forgetful, continue to c/o CP on and off, Periodically more confused and agitated  Other ROS reviewed and neg   Pt is awaiting insurance authorization for DC to rehab    Vital Signs Last 24 Hrs  T(C): 36.4 (26 Jun 2018 08:38), Max: 36.7 (25 Jun 2018 18:25)  T(F): 97.5 (26 Jun 2018 08:38), Max: 98.1 (25 Jun 2018 18:25)  HR: 84 (26 Jun 2018 08:46) (67 - 90)  BP: 158/94 (26 Jun 2018 08:46) (130/73 - 184/104)  RR: 20 (26 Jun 2018 08:38) (18 - 20)  SpO2: 97% (25 Jun 2018 23:15) (96% - 97%)    MEDICATIONS  (STANDING):  amLODIPine   Tablet 5 milliGRAM(s) Oral daily  azithromycin   Tablet 500 milliGRAM(s) Oral daily  cefTRIAXone   IVPB 2 Gram(s) IV Intermittent every 24 hours  cefTRIAXone   IVPB      DULoxetine 20 milliGRAM(s) Oral daily  enoxaparin Injectable 40 milliGRAM(s) SubCutaneous every 24 hours  indomethacin 25 milliGRAM(s) Oral two times a day  mesalamine DR Capsule 400 milliGRAM(s) Oral three times a day  oxybutynin 5 milliGRAM(s) Oral two times a day  pantoprazole    Tablet 40 milliGRAM(s) Oral before breakfast  QUEtiapine 25 milliGRAM(s) Oral at bedtime  saccharomyces boulardii 250 milliGRAM(s) Oral two times a day    MEDICATIONS  (PRN):  ondansetron Injectable 4 milliGRAM(s) IV Push every 4 hours PRN Nausea and/or Vomiting    RADIOLOGY & ADDITIONAL TESTS: perosnally visualized    PHYSICAL EXAM:    General: elderly female in no acute distress  Eyes: PERRLA, EOMI; conjunctiva and sclera clear  Head: Normocephalic; atraumatic  ENMT: No nasal discharge; airway clear  Neck: Supple; non tender; no masses  Respiratory: decreased BS Bibasilarly R>L with bibasilar crackles, + tenderness on palpation of right chest  Cardiovascular: Regular rate and rhythm. S1 and S2  Gastrointestinal: Soft non-tender non-distended; Normal bowel sounds  Genitourinary: No costovertebral angle tenderness  Extremities: Normal range of motion, No clubbing, cyanosis or edema  Vascular: Peripheral pulses palpable 2+ bilaterally  Neurological: Alert and oriented x4  Skin: Warm and dry.   Musculoskeletal: Normal tone, without deformities  Psychiatric: depressed

## 2018-06-26 NOTE — PROGRESS NOTE ADULT - PROVIDER SPECIALTY LIST ADULT
Hospitalist
Infectious Disease
Hospitalist
Hospitalist

## 2018-08-16 PROBLEM — F32.9 MAJOR DEPRESSIVE DISORDER, SINGLE EPISODE, UNSPECIFIED: Chronic | Status: ACTIVE | Noted: 2018-06-16

## 2018-08-27 ENCOUNTER — APPOINTMENT (OUTPATIENT)
Dept: DERMATOLOGY | Facility: CLINIC | Age: 83
End: 2018-08-27
Payer: MEDICARE

## 2018-08-27 PROCEDURE — 99202 OFFICE O/P NEW SF 15 MIN: CPT

## 2018-10-26 ENCOUNTER — INPATIENT (INPATIENT)
Facility: HOSPITAL | Age: 83
LOS: 3 days | Discharge: HOME | DRG: 641 | End: 2018-10-30
Attending: HOSPITALIST | Admitting: INTERNAL MEDICINE
Payer: MEDICARE

## 2018-10-26 VITALS
RESPIRATION RATE: 16 BRPM | HEART RATE: 73 BPM | OXYGEN SATURATION: 99 % | DIASTOLIC BLOOD PRESSURE: 87 MMHG | SYSTOLIC BLOOD PRESSURE: 161 MMHG | TEMPERATURE: 98 F

## 2018-10-26 DIAGNOSIS — Z95.0 PRESENCE OF CARDIAC PACEMAKER: Chronic | ICD-10-CM

## 2018-10-26 LAB
ALBUMIN SERPL ELPH-MCNC: 3.7 G/DL — SIGNIFICANT CHANGE UP (ref 3.3–5.2)
ALP SERPL-CCNC: 75 U/L — SIGNIFICANT CHANGE UP (ref 40–120)
ALT FLD-CCNC: 9 U/L — SIGNIFICANT CHANGE UP
ANION GAP SERPL CALC-SCNC: 12 MMOL/L — SIGNIFICANT CHANGE UP (ref 5–17)
AST SERPL-CCNC: 20 U/L — SIGNIFICANT CHANGE UP
BASOPHILS # BLD AUTO: 0 K/UL — SIGNIFICANT CHANGE UP (ref 0–0.2)
BASOPHILS NFR BLD AUTO: 0.2 % — SIGNIFICANT CHANGE UP (ref 0–2)
BILIRUB SERPL-MCNC: 0.5 MG/DL — SIGNIFICANT CHANGE UP (ref 0.4–2)
BUN SERPL-MCNC: 15 MG/DL — SIGNIFICANT CHANGE UP (ref 8–20)
CALCIUM SERPL-MCNC: 9.5 MG/DL — SIGNIFICANT CHANGE UP (ref 8.6–10.2)
CHLORIDE SERPL-SCNC: 101 MMOL/L — SIGNIFICANT CHANGE UP (ref 98–107)
CO2 SERPL-SCNC: 30 MMOL/L — HIGH (ref 22–29)
CREAT SERPL-MCNC: 0.59 MG/DL — SIGNIFICANT CHANGE UP (ref 0.5–1.3)
EOSINOPHIL # BLD AUTO: 0.3 K/UL — SIGNIFICANT CHANGE UP (ref 0–0.5)
EOSINOPHIL NFR BLD AUTO: 2.7 % — SIGNIFICANT CHANGE UP (ref 0–6)
GLUCOSE SERPL-MCNC: 82 MG/DL — SIGNIFICANT CHANGE UP (ref 70–115)
HCT VFR BLD CALC: 43.7 % — SIGNIFICANT CHANGE UP (ref 37–47)
HGB BLD-MCNC: 14 G/DL — SIGNIFICANT CHANGE UP (ref 12–16)
LACTATE BLDV-MCNC: 1.1 MMOL/L — SIGNIFICANT CHANGE UP (ref 0.5–2)
LYMPHOCYTES # BLD AUTO: 2.2 K/UL — SIGNIFICANT CHANGE UP (ref 1–4.8)
LYMPHOCYTES # BLD AUTO: 21.8 % — SIGNIFICANT CHANGE UP (ref 20–55)
MAGNESIUM SERPL-MCNC: 2 MG/DL — SIGNIFICANT CHANGE UP (ref 1.6–2.6)
MCHC RBC-ENTMCNC: 29.7 PG — SIGNIFICANT CHANGE UP (ref 27–31)
MCHC RBC-ENTMCNC: 32 G/DL — SIGNIFICANT CHANGE UP (ref 32–36)
MCV RBC AUTO: 92.6 FL — SIGNIFICANT CHANGE UP (ref 81–99)
MONOCYTES # BLD AUTO: 0.7 K/UL — SIGNIFICANT CHANGE UP (ref 0–0.8)
MONOCYTES NFR BLD AUTO: 6.8 % — SIGNIFICANT CHANGE UP (ref 3–10)
NEUTROPHILS # BLD AUTO: 7 K/UL — SIGNIFICANT CHANGE UP (ref 1.8–8)
NEUTROPHILS NFR BLD AUTO: 68.3 % — SIGNIFICANT CHANGE UP (ref 37–73)
NT-PROBNP SERPL-SCNC: 785 PG/ML — HIGH (ref 0–300)
PLATELET # BLD AUTO: 228 K/UL — SIGNIFICANT CHANGE UP (ref 150–400)
POTASSIUM SERPL-MCNC: 4.2 MMOL/L — SIGNIFICANT CHANGE UP (ref 3.5–5.3)
POTASSIUM SERPL-SCNC: 4.2 MMOL/L — SIGNIFICANT CHANGE UP (ref 3.5–5.3)
PROT SERPL-MCNC: 7.3 G/DL — SIGNIFICANT CHANGE UP (ref 6.6–8.7)
RBC # BLD: 4.72 M/UL — SIGNIFICANT CHANGE UP (ref 4.4–5.2)
RBC # FLD: 14.3 % — SIGNIFICANT CHANGE UP (ref 11–15.6)
SODIUM SERPL-SCNC: 143 MMOL/L — SIGNIFICANT CHANGE UP (ref 135–145)
T3 SERPL-MCNC: 76 NG/DL — LOW (ref 80–200)
T4 AB SER-ACNC: 5.1 UG/DL — SIGNIFICANT CHANGE UP (ref 4.5–12)
TSH SERPL-MCNC: 1.5 UIU/ML — SIGNIFICANT CHANGE UP (ref 0.27–4.2)
WBC # BLD: 10.2 K/UL — SIGNIFICANT CHANGE UP (ref 4.8–10.8)
WBC # FLD AUTO: 10.2 K/UL — SIGNIFICANT CHANGE UP (ref 4.8–10.8)

## 2018-10-26 PROCEDURE — 99285 EMERGENCY DEPT VISIT HI MDM: CPT

## 2018-10-26 PROCEDURE — 93010 ELECTROCARDIOGRAM REPORT: CPT

## 2018-10-26 PROCEDURE — 70498 CT ANGIOGRAPHY NECK: CPT | Mod: 26

## 2018-10-26 PROCEDURE — 71045 X-RAY EXAM CHEST 1 VIEW: CPT | Mod: 26

## 2018-10-26 PROCEDURE — 70496 CT ANGIOGRAPHY HEAD: CPT | Mod: 26

## 2018-10-26 RX ORDER — DIPHENHYDRAMINE HCL 50 MG
25 CAPSULE ORAL ONCE
Qty: 0 | Refills: 0 | Status: COMPLETED | OUTPATIENT
Start: 2018-10-26 | End: 2018-10-26

## 2018-10-26 RX ORDER — MECLIZINE HCL 12.5 MG
25 TABLET ORAL ONCE
Qty: 0 | Refills: 0 | Status: COMPLETED | OUTPATIENT
Start: 2018-10-26 | End: 2018-10-26

## 2018-10-26 RX ADMIN — Medication 25 MILLIGRAM(S): at 19:53

## 2018-10-26 NOTE — ED PROVIDER NOTE - EYES, MLM
When you get dizzy, check your sugar and your blood pressure right away. Do the PT exercises every day to prevent the dizziness. Continue your medicines for diabetes and COPD.
Clear bilaterally, pupils equal, round and reactive to light. (-) nystagmus

## 2018-10-26 NOTE — ED PROVIDER NOTE - PROGRESS NOTE DETAILS
still with vertigo.  will give ativan and benadryl, CT of brain pending, probable admit. waiting for meds

## 2018-10-26 NOTE — ED PROVIDER NOTE - CHPI ED SYMPTOMS NEG
no dizziness/no change in level of consciousness/no confusion/no loss of consciousness/no blurred vision/no nausea

## 2018-10-26 NOTE — ED PROVIDER NOTE - NEURO NEGATIVE STATEMENT, MLM
no loss of consciousness, does not walk due to severe spinal stenosis, , no headache, no sensory deficits, and no weakness., incontinent of urine at baseline.

## 2018-10-26 NOTE — ED PROVIDER NOTE - MEDICAL DECISION MAKING DETAILS
[facility] Medication Refill Request Information:  * Please contact your pharmacy regarding ANY request for medication refills.  ** [facility] Prescription Fax = [phone]  * Please allow 3 business days for routine medication refills.  * Please allow 5 business days for controlled substance medication refills.     [facility] Test Result notification information:  *You will be notified with in 7-10 days of your appointment day regarding the results of your test.  If you are on MyChart you will be notified as soon as the provider has reviewed the results and signed off on them.    [facility] [phone]   DEXA Screening Tool    Dexa Scan  Is the patient taking any calcium supplements? , if yes patient must stop calcium supplements 48 hours prior to appointment.   pt with intractable vertigo. check labs, ct mary head and neck, meclizine, benadryl, ativan, admit for neuro eval.

## 2018-10-26 NOTE — ED PROVIDER NOTE - OBJECTIVE STATEMENT
Pt complaints of vertigo, not weakness as stated in triage note. Pt has been in bed for 4 days. Can not sit up to eat.  Incontinent of urine at baseline.  Nausea with vertigo, no vomiting.  No vertigo or nausea if supine. Denies other acute neuro symptoms.

## 2018-10-27 DIAGNOSIS — I10 ESSENTIAL (PRIMARY) HYPERTENSION: ICD-10-CM

## 2018-10-27 DIAGNOSIS — R42 DIZZINESS AND GIDDINESS: ICD-10-CM

## 2018-10-27 DIAGNOSIS — I00 RHEUMATIC FEVER WITHOUT HEART INVOLVEMENT: ICD-10-CM

## 2018-10-27 DIAGNOSIS — K50.919 CROHN'S DISEASE, UNSPECIFIED, WITH UNSPECIFIED COMPLICATIONS: ICD-10-CM

## 2018-10-27 PROCEDURE — 12345: CPT | Mod: NC

## 2018-10-27 PROCEDURE — 99223 1ST HOSP IP/OBS HIGH 75: CPT

## 2018-10-27 RX ORDER — MECLIZINE HCL 12.5 MG
12.5 TABLET ORAL EVERY 8 HOURS
Qty: 0 | Refills: 0 | Status: DISCONTINUED | OUTPATIENT
Start: 2018-10-27 | End: 2018-10-30

## 2018-10-27 RX ORDER — PANTOPRAZOLE SODIUM 20 MG/1
40 TABLET, DELAYED RELEASE ORAL
Qty: 0 | Refills: 0 | Status: DISCONTINUED | OUTPATIENT
Start: 2018-10-27 | End: 2018-10-30

## 2018-10-27 RX ORDER — MESALAMINE 400 MG
400 TABLET, DELAYED RELEASE (ENTERIC COATED) ORAL THREE TIMES A DAY
Qty: 0 | Refills: 0 | Status: DISCONTINUED | OUTPATIENT
Start: 2018-10-27 | End: 2018-10-30

## 2018-10-27 RX ORDER — ACETAMINOPHEN 500 MG
650 TABLET ORAL EVERY 6 HOURS
Qty: 0 | Refills: 0 | Status: DISCONTINUED | OUTPATIENT
Start: 2018-10-27 | End: 2018-10-30

## 2018-10-27 RX ORDER — ENOXAPARIN SODIUM 100 MG/ML
40 INJECTION SUBCUTANEOUS DAILY
Qty: 0 | Refills: 0 | Status: DISCONTINUED | OUTPATIENT
Start: 2018-10-27 | End: 2018-10-30

## 2018-10-27 RX ORDER — QUETIAPINE FUMARATE 200 MG/1
25 TABLET, FILM COATED ORAL AT BEDTIME
Qty: 0 | Refills: 0 | Status: DISCONTINUED | OUTPATIENT
Start: 2018-10-27 | End: 2018-10-30

## 2018-10-27 RX ORDER — SIMVASTATIN 20 MG/1
20 TABLET, FILM COATED ORAL AT BEDTIME
Qty: 0 | Refills: 0 | Status: DISCONTINUED | OUTPATIENT
Start: 2018-10-27 | End: 2018-10-30

## 2018-10-27 RX ORDER — DULOXETINE HYDROCHLORIDE 30 MG/1
20 CAPSULE, DELAYED RELEASE ORAL DAILY
Qty: 0 | Refills: 0 | Status: DISCONTINUED | OUTPATIENT
Start: 2018-10-27 | End: 2018-10-30

## 2018-10-27 RX ADMIN — Medication 400 MILLIGRAM(S): at 13:58

## 2018-10-27 RX ADMIN — Medication 400 MILLIGRAM(S): at 22:48

## 2018-10-27 RX ADMIN — PANTOPRAZOLE SODIUM 40 MILLIGRAM(S): 20 TABLET, DELAYED RELEASE ORAL at 11:51

## 2018-10-27 RX ADMIN — DULOXETINE HYDROCHLORIDE 20 MILLIGRAM(S): 30 CAPSULE, DELAYED RELEASE ORAL at 11:52

## 2018-10-27 RX ADMIN — SIMVASTATIN 20 MILLIGRAM(S): 20 TABLET, FILM COATED ORAL at 22:48

## 2018-10-27 RX ADMIN — ENOXAPARIN SODIUM 40 MILLIGRAM(S): 100 INJECTION SUBCUTANEOUS at 11:51

## 2018-10-27 RX ADMIN — QUETIAPINE FUMARATE 25 MILLIGRAM(S): 200 TABLET, FILM COATED ORAL at 22:47

## 2018-10-27 RX ADMIN — Medication 25 MILLIGRAM(S): at 03:01

## 2018-10-27 NOTE — PROGRESS NOTE ADULT - ATTENDING COMMENTS
DVT: Lovenox as patients is not ambulating.  OOB to chair with assistance and if patient is comfortable.

## 2018-10-27 NOTE — H&P ADULT - NEUROLOGICAL DETAILS
alert and oriented x 3/normal finger to nose test B/l/normal strength/sensation intact/cranial nerves intact

## 2018-10-27 NOTE — CONSULT NOTE ADULT - SUBJECTIVE AND OBJECTIVE BOX
[facility], [facility]                                                    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 Dizziness  precipitated by getting up from bed. Comfortable in the bed at present time with no new c/o. Echo from the office 10/2018 with EF of 55-60%, moderate LVH, diastolic dysfunction, mild valvular abn., carotid duplex scan 10/18 with b/l mild <15% stenosis, nuclear stress test 10/12/18 with no ischemia.       PAST MEDICAL & SURGICAL HISTORY:  Depression  Cardiac disease: pacemaker 2007  High cholesterol  Rheumatoid arteritis  Crohn disease  Pacemaker      sulfa drugs (Anaphylaxis)      MEDICATIONS  (STANDING):  DULoxetine 20 milliGRAM(s) Oral daily  enoxaparin Injectable 40 milliGRAM(s) SubCutaneous daily  mesalamine DR Capsule 400 milliGRAM(s) Oral three times a day  pantoprazole    Tablet 40 milliGRAM(s) Oral before breakfast  QUEtiapine 25 milliGRAM(s) Oral at bedtime  simvastatin 20 milliGRAM(s) Oral at bedtime    MEDICATIONS  (PRN):  acetaminophen   Tablet .. 650 milliGRAM(s) Oral every 6 hours PRN Mild Pain (1 - 3)      Social History: no active tobacco / EtOH / IVDA    Family History: No pertinent family history in first degree relatives      ROS:   · Negative General Symptoms	no fever; no chills	  · Negative Skin Symptoms	no rash; no itching	  · Negative Ophthalmologic Symptoms	no diplopia; no photophobia; no blurred vision L; no blurred vision R	  · Negative ENMT Symptoms	no hearing difficulty; no tinnitus; no throat pain; no dysphagia	  · Negative Respiratory and Thorax Symptoms	no wheezing; no cough	  · Negative Cardiovascular Symptoms	no chest pain; no palpitations; no orthopnea; no peripheral edema	  · Negative Gastrointestinal Symptoms	no diarrhea; no abdominal pain; no melena; no hematochezia	  · Gastrointestinal Symptoms	nausea	  · Negative General Genitourinary Symptoms	no hematuria; no renal colic; no flank pain L; no dysuria	  · Negative Neurological Symptoms	no transient paralysis; no weakness; no headache	        Vital Signs Last 24 Hrs  T(C): 36.7 (27 Oct 2018 09:11), Max: 36.9 (26 Oct 2018 19:41)  T(F): 98.1 (27 Oct 2018 09:11), Max: 98.5 (26 Oct 2018 19:41)  HR: 68 (27 Oct 2018 09:11) (67 - 80)  BP: 153/85 (27 Oct 2018 09:11) (138/84 - 161/87)  BP(mean): --  RR: 18 (27 Oct 2018 09:11) (16 - 18)  SpO2: 97% (27 Oct 2018 09:11) (94% - 99%)    I&O's Detail    I&O's Summary          PHYSICAL EXAM:  General: Appears well developed, well nourished, no acute distress  HEENT: Head: normocephalic, atraumatic  Eyes: Pupils equal and reactive  Neck: Supple, no carotid bruit, no JVD, no HJR  CARDIOVASCULAR: Normal S1 and S2, no murmur, rub, or gallop  LUNGS: Clear to auscultation bilaterally, no rales, rhonchi or wheeze  ABDOMEN: Soft, nontender, non-distended, positive bowel sounds, no mass or bruit  EXTREMITIES: No edema, distal pulses WNL  SKIN: Warm and dry with normal turgor  NEURO: Alert & oriented x 3, grossly intact  PSYCH: normal mood and affect    LABS:                        14.0   10.2  )-----------( 228      ( 26 Oct 2018 19:33 )             43.7     10-26    143  |  101  |  15.0  ----------------------------<  82  4.2   |  30.0<H>  |  0.59    Ca    9.5      26 Oct 2018 19:33  Mg     2.0     10-26    TPro  7.3  /  Alb  3.7  /  TBili  0.5  /  DBili  x   /  AST  20  /  ALT  9   /  AlkPhos  75  10-26            RADIOLOGY & ADDITIONAL STUDIES:    ECG: SR with V pacing, first degree AV block     ECHO: < from: TTE Echo Complete w/Doppler (06.21.18 @ 09:47) >  Summary:   1. Left ventricular ejection fraction, by visual estimation, is 55 to   60%.   2. Normal global left ventricular systolic function.   3. Spectral Doppler shows impaired relaxation pattern of left   ventricular myocardial filling (Grade I diastolic dysfunction).   4. Normal right ventricular size and function.   5. There is no evidence of pericardial effusion.   6. Moderate tricuspid regurgitation.   7. Mild to moderate aortic regurgitation.   8. Pulmonic valve regurgitation.   9. Estimated pulmonary artery systolic pressure is 39.7 mmHg assuming a   right atrial pressure of 3 mmHg, which is consistent with borderline   pulmonary hypertension.        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 Dizziness  precipitated by getting up from bed. Comfortable in the bed at present time with no new c/o. Echo from the office 10/2018 with EF of 55-60%, moderate LVH, diastolic dysfunction, mild valvular abn., carotid duplex scan 10/18 with b/l mild <15% stenosis, nuclear stress test 10/12/18 with no ischemia.     - Monitor on telemetry  - Orthostatics  - Further work-up & testing (possible MRI, EEG, etc.) per neurology   - No evidence of ischemia or CHF clinically  - Rhythm/hemodynamics stable = continue current doses for now and titrate PRN    We will follow with you.  Thank you for allowing me to participate in the care of your patient.      Sincerely,
[facility]                                     [facility]                                 Alanis Flores, & Rodri                                  [address]                                        [place], [place], [address]                                             [phone]    CC:   dizziness  HPI:  The patient is a 82y Female who presented with 5-6 days of dizziness with nausea on arising and with rapid change of head position.  She is having trouble getting out of bed because of this dizziness. Laying still makes this better, mpove She has not vomited.  She is feeling a bit better today.  She has had attacks of vertigo in the past.  Neuro eval is requested.    PAST MEDICAL & SURGICAL HISTORY:  Depression  Cardiac disease: pacemaker 2007  High cholesterol  Rheumatoid arteritis  Crohn disease  Pacemaker      MEDICATIONS  (STANDING):  DULoxetine 20 milliGRAM(s) Oral daily  enoxaparin Injectable 40 milliGRAM(s) SubCutaneous daily  mesalamine DR Capsule 400 milliGRAM(s) Oral three times a day  pantoprazole    Tablet 40 milliGRAM(s) Oral before breakfast  QUEtiapine 25 milliGRAM(s) Oral at bedtime  simvastatin 20 milliGRAM(s) Oral at bedtime    MEDICATIONS  (PRN):  acetaminophen   Tablet .. 650 milliGRAM(s) Oral every 6 hours PRN Mild Pain (1 - 3)      Allergies    sulfa drugs (Anaphylaxis)    Intolerances        SOCIAL HISTORY:  no tob,   no alcohol   no drugs    FAMILY HISTORY:  No pertinent family history in first degree relatives: no family h/o vertigo      ROS:  ROS: 14 point ROS negative other than what is present in HPI or below    Vital Signs Last 24 Hrs  T(C): 36.7 (27 Oct 2018 09:11), Max: 36.9 (26 Oct 2018 19:41)  T(F): 98.1 (27 Oct 2018 09:11), Max: 98.5 (26 Oct 2018 19:41)  HR: 68 (27 Oct 2018 09:11) (67 - 80)  BP: 153/85 (27 Oct 2018 09:11) (138/84 - 161/87)  BP(mean): --  RR: 18 (27 Oct 2018 09:11) (16 - 18)  SpO2: 97% (27 Oct 2018 09:11) (94% - 99%)      General: NAD    Detailed Neurologic Exam:    Mental status: The patient is awake and alert and has normal attention span.  The patient is fully oriented in 3 spheres. The patient is oriented to current events. The patient is able to name objects, follow commands, repeat sentences.    Cranial nerves: Pupils equal and react symmetrically to light. There is no visual field deficit to confrontation. Extraocular motion is full with no nystagmus. There is no ptosis. Facial sensation is intact. Facial musculature is symmetric. Palate elevates symmetrically. Shoulder shrug is normal. Tongue is midline.    Motor: There is normal bulk and tone.  There is no tremor.  Strength is 5/5 in the right arm and leg.   Strength is 5/5 in the left arm and leg.    Sensation: Intact to light touch and pin in 4 extremities    Reflexes: 1+ throughout and plantar responses are flexor.    Cerebellar: There is no dysmetria on finger to nose testing.    Gait : deferred    LABS:                         14.0   10.2  )-----------( 228      ( 26 Oct 2018 19:33 )             43.7       10-26    143  |  101  |  15.0  ----------------------------<  82  4.2   |  30.0<H>  |  0.59    Ca    9.5      26 Oct 2018 19:33  Mg     2.0     10-26    TPro  7.3  /  Alb  3.7  /  TBili  0.5  /  DBili  x   /  AST  20  /  ALT  9   /  AlkPhos  75  10-26    RADIOLOGY & ADDITIONAL STUDIES (independently reviewed unless otherwise noted):  CT head- no acute CVA, mass or bleed

## 2018-10-27 NOTE — ED ADULT NURSE REASSESSMENT NOTE - NS ED NURSE REASSESS COMMENT FT1
Received in bed. Patient is alert and verbal. denies pain and discomfort, or dizziness at this time. vital sins are stable. patient awaiting hospitalist admission at this time. Will continue to monitor.

## 2018-10-27 NOTE — PROGRESS NOTE ADULT - ASSESSMENT
81 y/o female with dizziness, Rheumatoid arthritis, Crohn's disease. HTN a/w dizziness. CT head, CT angio head and neck negative has PPM on left chest. ?BPPV vs cardiac

## 2018-10-27 NOTE — PROGRESS NOTE ADULT - SUBJECTIVE AND OBJECTIVE BOX
Dr. Elias Hospitalist Progress Note  KARSTEN HALE [id number]    Patient is a 82y old  Female who presents with a chief complaint of dizziness (27 Oct 2018 04:09)    HPI:  81 y/o female with h/o pacemaker, Crohn's disease and rheumatoid arthritis, came to the ER because of dizziness attacks for the last 5 days. dizziness attacks are precipitated by getting up from bed. patient does not give a clear description if it is dizziness or vertigo. dizziness attacks are associated with nausea. no recent hearing change. no ear pain or discharge. no headache or weakness. (27 Oct 2018 04:09). CT head, CTA head and neck neg for acute/significant events. EKG showed paced beats. ENT cx was called.    Interval: seen at bedside. reported symptoms better when she is lying flat with head end up. did not notice any relation with head movement. reported lightheadedness when sitting up, otherwise spining. symptoms started after she did "lots of house work". symptoms mostly related to acitivity and exertion. no CP/SOB/palpitation or feeling of skipped beats/LOC/abd pain/n/v/d/c/dysuria/headaches/vision or speech problem or limb weakness. no head injury.  no earaches/discharge/tinnitus/hearing impairment or fullness inside the ears. no sinus pain or congestion. all other ROS negative. patient reportedly had her PPM interrogation either aug or sept and had echo on june. she reportedly has seen by her primary cardiologist 1 week ago, with no problem reported. however she idid not have present problem last week.  PAtient follows with [facility], cannot mention her primary cardiologist name.       ROS:  CONSTITUTIONAL:  No distress.no fever/chills/fatigue/weight loss  HEENT:  Eyes:  No diplopia or blurred vision.   CARDIOVASCULAR:  No pressure, squeezing, tightness, heaviness or aching about the chest; no palpitations. no leg swelling, no orthopnea or PND  RESPIRATORY:  no SOB. no wheezing. no cough or sputum.  No hemoptysis  GI: no nausea, no vomiting, no diarrhea, no constipation. No hematochezia or melena  EXT:No joint pain or joint swelling or redness  SKIN: no skin break or ulcer. No cellulitis.   CNS: No headaches. No weakness.no numbness. No depression or anxiety. No SI    T(C): 36.7 (10-27-18 @ 09:11), Max: 36.9 (10-26-18 @ 19:41)  HR: 68 (10-27-18 @ 09:11) (67 - 80)  BP: 153/85 (10-27-18 @ 09:11) (138/84 - 161/87)  RR: 18 (10-27-18 @ 09:11) (16 - 18)  SpO2: 97% (10-27-18 @ 09:11) (94% - 99%)  CAPILLARY BLOOD GLUCOSE          Physical Exam:  GENERAL: Not in distress. Alert . patient reported feeling lightheaded after sitting up on bed. did not report any symptoms with head movement  HEENT:  Normocephalic and atraumatic. PEARLA,EOMI. ear,nose throat apparently normal. did examine with otoscope. will wait for ENT. no sinus tenderness. hearing normal  NECK: Supple.  No JVD.    CARDIOVASCULAR: RRR S1, S2. No murmur/rubs/gallop. PPM left chest  LUNGS: BLAE+, no rales, no wheezing, no rhonchi.    ABDOMEN: ND. Soft,  NT, no guarding / rebound / rigidity. BS normoactive. No CVA tenderness.    BACK: No spine tenderness.  EXTREMITIES: no cyanosis, no clubbing, no edema.   SKIN: no rash. No skin break or ulcer. No cellulitis.  NEUROLOGIC: AAO*3.strength is symmetric, sensation intact, speech fluent.  reflex symmetric planter flexor. CN 2-12 normal  PSYCHIATRIC: Calm.  No agitation.    Labs                        14.0   10.2  )-----------( 228      ( 26 Oct 2018 19:33 )             43.7     10-26    143  |  101  |  15.0  ----------------------------<  82  4.2   |  30.0<H>  |  0.59    Ca    9.5      26 Oct 2018 19:33  Mg     2.0     10-26    TPro  7.3  /  Alb  3.7  /  TBili  0.5  /  DBili  x   /  AST  20  /  ALT  9   /  AlkPhos  75  10-26     MEDICATIONS  (STANDING):  DULoxetine 20 milliGRAM(s) Oral daily  enoxaparin Injectable 40 milliGRAM(s) SubCutaneous daily  mesalamine DR Capsule 400 milliGRAM(s) Oral three times a day  pantoprazole    Tablet 40 milliGRAM(s) Oral before breakfast  QUEtiapine 25 milliGRAM(s) Oral at bedtime  simvastatin 20 milliGRAM(s) Oral at bedtime    MEDICATIONS  (PRN):  acetaminophen   Tablet .. 650 milliGRAM(s) Oral every 6 hours PRN Mild Pain (1 - 3)    < from: CT Angio Neck w/ IV Cont (10.26.18 @ 22:40) >  IMPRESSION:    CT BRAIN:  No acute intracranial hemorrhage or mass effect. Chronic   changes noted    CTA NECK:  No significant stenosis of the cervical carotid or vertebral   arteries.    CTA HEAD:  No significant stenosis or vessel cutoff along the major   intracranial arterial vasculature    < end of copied text >    < from: TTE Echo Complete w/Doppler (06.21.18 @ 09:47) >    Summary:   1. Left ventricular ejection fraction, by visual estimation, is 55 to   60%.   2. Normal global left ventricular systolic function.   3. Spectral Doppler shows impaired relaxation pattern of left   ventricular myocardial filling (Grade I diastolic dysfunction).   4. Normal right ventricular size and function.   5. There is no evidence of pericardial effusion.   6. Moderate tricuspid regurgitation.   7. Mild to moderate aortic regurgitation.   8. Pulmonic valve regurgitation.   9. Estimated pulmonary artery systolic pressure is 39.7 mmHg assuming a   right atrial pressure of 3 mmHg, which is consistent with borderline   pulmonary hypertension.    [id number] Jarod Tucker MD, Electronically signed on 6/21/2018 at 12:56:45   PM    < end of copied text >

## 2018-10-27 NOTE — PROGRESS NOTE ADULT - PROBLEM SELECTOR PLAN 1
Hold amlodipine. f/u ENT consult Dr. cox. . defer MRI brain for PPM. check orthostasis. called cardiology for evaluation and PPM interrogation. fall precautions. PT eval for safe DC once symptoms better controlled. neuro eval Hold amlodipine. f/u ENT consult Dr. cox. . defer MRI brain for PPM. check orthostasis. called cardiology for evaluation and PPM interrogation. fall precautions. PT eval for safe DC once symptoms better controlled. neuro eval. Q 6hrs neuro check. cardiac monitor

## 2018-10-27 NOTE — H&P ADULT - HISTORY OF PRESENT ILLNESS
83 y/o female with h/o pacemaker, Crohn's disease and rheumatoid arthritis, came to the ER because of dizziness attacks for the last 5 days. dizziness attacks are precipitated by getting up from bed. patient does not give a clear description if it is dizziness or vertigo. dizziness attacks are associated with nausea. no recent hearing change. no ear pain or discharge. no headache or weakness.

## 2018-10-27 NOTE — CONSULT NOTE ADULT - ASSESSMENT
The patient is a 82y Female who is followed by neurology because of vertigo    Likely peripheral vertigo  related to position change  meclizine prn  can't get MRI due to PPM  vestibular rehab on d/c    TIA/CVA  This does not sound like a vascular event  not likely TIA    will follow with you    Justus Thapa MD PhD   [id number]

## 2018-10-28 PROCEDURE — 99233 SBSQ HOSP IP/OBS HIGH 50: CPT

## 2018-10-28 PROCEDURE — 93306 TTE W/DOPPLER COMPLETE: CPT | Mod: 26

## 2018-10-28 PROCEDURE — 93880 EXTRACRANIAL BILAT STUDY: CPT | Mod: 26

## 2018-10-28 RX ORDER — INFLUENZA VIRUS VACCINE 15; 15; 15; 15 UG/.5ML; UG/.5ML; UG/.5ML; UG/.5ML
0.5 SUSPENSION INTRAMUSCULAR ONCE
Qty: 0 | Refills: 0 | Status: COMPLETED | OUTPATIENT
Start: 2018-10-28 | End: 2018-10-28

## 2018-10-28 RX ADMIN — PANTOPRAZOLE SODIUM 40 MILLIGRAM(S): 20 TABLET, DELAYED RELEASE ORAL at 05:28

## 2018-10-28 RX ADMIN — DULOXETINE HYDROCHLORIDE 20 MILLIGRAM(S): 30 CAPSULE, DELAYED RELEASE ORAL at 11:14

## 2018-10-28 RX ADMIN — QUETIAPINE FUMARATE 25 MILLIGRAM(S): 200 TABLET, FILM COATED ORAL at 21:43

## 2018-10-28 RX ADMIN — ENOXAPARIN SODIUM 40 MILLIGRAM(S): 100 INJECTION SUBCUTANEOUS at 11:14

## 2018-10-28 RX ADMIN — Medication 400 MILLIGRAM(S): at 11:14

## 2018-10-28 RX ADMIN — Medication 400 MILLIGRAM(S): at 21:43

## 2018-10-28 RX ADMIN — Medication 400 MILLIGRAM(S): at 05:28

## 2018-10-28 RX ADMIN — SIMVASTATIN 20 MILLIGRAM(S): 20 TABLET, FILM COATED ORAL at 21:43

## 2018-10-28 NOTE — PROGRESS NOTE ADULT - SUBJECTIVE AND OBJECTIVE BOX
[facility], [facility]                                                    [address], [address], [place] [address]                                                         PHONE: [phone]    FAX: [phone] [address], [address], [place] [address]                                                 PHONE: [phone]    FAX: [phone]  *******************************************************************************    Overnight events/Subjective Assessment: comfortable in the bed with no new c/o     INTERPRETATION OF TELEMETRY (personally reviewed): A-paced/V-sensed in 70s     sulfa drugs (Anaphylaxis)    MEDICATIONS  (STANDING):  DULoxetine 20 milliGRAM(s) Oral daily  enoxaparin Injectable 40 milliGRAM(s) SubCutaneous daily  influenza   Vaccine 0.5 milliLiter(s) IntraMuscular once  mesalamine DR Capsule 400 milliGRAM(s) Oral three times a day  pantoprazole    Tablet 40 milliGRAM(s) Oral before breakfast  QUEtiapine 25 milliGRAM(s) Oral at bedtime  simvastatin 20 milliGRAM(s) Oral at bedtime    MEDICATIONS  (PRN):  acetaminophen   Tablet .. 650 milliGRAM(s) Oral every 6 hours PRN Mild Pain (1 - 3)  meclizine 12.5 milliGRAM(s) Oral every 8 hours PRN Dizziness      Vital Signs Last 24 Hrs  T(C): 36.7 (28 Oct 2018 08:46), Max: 36.8 (27 Oct 2018 17:06)  T(F): 98.1 (28 Oct 2018 08:46), Max: 98.2 (27 Oct 2018 17:06)  HR: 80 (28 Oct 2018 08:46) (68 - 88)  BP: 148/86 (28 Oct 2018 08:46) (142/85 - 151/96)  BP(mean): --  RR: 17 (28 Oct 2018 08:46) (17 - 20)  SpO2: 96% (28 Oct 2018 08:46) (92% - 98%)    I&O's Detail    27 Oct 2018 07:01  -  28 Oct 2018 07:00  --------------------------------------------------------  IN:  Total IN: 0 mL    OUT:    Voided: 3 mL  Total OUT: 3 mL    Total NET: -3 mL        I&O's Summary    27 Oct 2018 07:01  -  28 Oct 2018 07:00  --------------------------------------------------------  IN: 0 mL / OUT: 3 mL / NET: -3 mL      PHYSICAL EXAM:    General: Appears well developed, well nourished, no acute distress  HEENT: Head: normocephalic, atraumatic  Eyes: Pupils equal and reactive  Neck: Supple, no carotid bruit, no JVD, no HJR  CARDIOVASCULAR: Normal S1 and S2, no murmur, rub, or gallop  LUNGS: Clear to auscultation bilaterally, no rales, rhonchi or wheeze  ABDOMEN: Soft, nontender, non-distended, positive bowel sounds, no mass or bruit  EXTREMITIES: No edema, distal pulses WNL  SKIN: Warm and dry with normal turgor  NEURO: Alert & oriented x 3, grossly intact  PSYCH: normal mood and affect        LABS:                        14.0   10.2  )-----------( 228      ( 26 Oct 2018 19:33 )             43.7     10-26    143  |  101  |  15.0  ----------------------------<  82  4.2   |  30.0<H>  |  0.59    Ca    9.5      26 Oct 2018 19:33  Mg     2.0     10-26    TPro  7.3  /  Alb  3.7  /  TBili  0.5  /  DBili  x   /  AST  20  /  ALT  9   /  AlkPhos  75  10-26          Serum Pro-Brain Natriuretic Peptide: 785 pg/mL (10-26 @ 19:33)  serum  Lipids:     Thyroid Stimulating Hormone, Serum: 1.50 uIU/mL (10-26 @ 19:33)      RADIOLOGY & ADDITIONAL STUDIES:    ECG: SR with V pacing, first degree AV block     ECHO: < from: TTE Echo Complete w/Doppler (06.21.18 @ 09:47) >  Summary:   1. Left ventricular ejection fraction, by visual estimation, is 55 to   60%.   2. Normal global left ventricular systolic function.   3. Spectral Doppler shows impaired relaxation pattern of left   ventricular myocardial filling (Grade I diastolic dysfunction).   4. Normal right ventricular size and function.   5. There is no evidence of pericardial effusion.   6. Moderate tricuspid regurgitation.   7. Mild to moderate aortic regurgitation.   8. Pulmonic valve regurgitation.   9. Estimated pulmonary artery systolic pressure is 39.7 mmHg assuming a   right atrial pressure of 3 mmHg, which is consistent with borderline   pulmonary hypertension.        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 Dizziness  precipitated by getting up from bed. Comfortable in the bed at present time with no new c/o. Echo from the office 10/2018 with EF of 55-60%, moderate LVH, diastolic dysfunction, mild valvular abn., carotid duplex scan 10/18 with b/l mild <15% stenosis, nuclear stress test 10/12/18 with no ischemia.     - Monitor on telemetry  - Orthostatics  - Further work-up & testing (possible MRI, EEG, etc.) per neurology   - Pending ENT eval  - No evidence of ischemia or CHF clinically  - Rhythm/hemodynamics stable, continue current meds/doses for now and titrate PRN    We will follow with you.  Thank you for allowing me to participate in the care of your patient.

## 2018-10-28 NOTE — PROGRESS NOTE ADULT - ATTENDING COMMENTS
DVT: Lovenox as patients is not ambulating.  OOB to chair with assistance.  PT DVT: Lovenox as patients is not ambulating.  OOB to chair with assistance.  PT    Dispo:PT eval for safe DC. May need JULIAN. need HHA

## 2018-10-28 NOTE — PROGRESS NOTE ADULT - PROBLEM SELECTOR PLAN 1
hold amlodipine. f/u ENT consult Dr. cox. check orthostasis.  fall precautions. PT eval for safe DC. OOB to chair with assistance. neuro and cardio eval appreciated. c/w cardiac monitor until cardio clears. on meclizine prn  can't get MRI due to PPM. vestibular rehab on d/c

## 2018-10-28 NOTE — PROGRESS NOTE ADULT - SUBJECTIVE AND OBJECTIVE BOX
Dr. Elias Hospitalist Progress Note  KARSTEN HALE [id number]    Patient is a 82y old  Female who presents with a chief complaint of dizziness (27 Oct 2018 04:09)    HPI:  81 y/o female with h/o pacemaker, Crohn's disease and rheumatoid arthritis, came to the ER because of dizziness attacks for the last 5 days. dizziness attacks are precipitated by getting up from bed. patient does not give a clear description if it is dizziness or vertigo. dizziness attacks are associated with nausea. no recent hearing change. no ear pain or discharge. no headache or weakness. (27 Oct 2018 04:09). CT head, CTA head and neck neg for acute/significant events. EKG showed paced beats. ENT cx was called. not seen as off 10/28. patient  reported symptoms better when she is lying flat with head end up. did not notice any relation with head movement. reported lightheadedness when sitting up, otherwise spining. symptoms started after she did "lots of house work". symptoms mostly related to acitivity and exertion. no CP/SOB/palpitation or feeling of skipped beats/LOC/abd pain/n/v/d/c/dysuria/headaches/vision or speech problem or limb weakness. no head injury.  no earaches/discharge/tinnitus/hearing impairment or fullness inside the ears. no sinus pain or congestion. all other ROS negative. patient reportedly had her PPM interrogation either aug or sept and had echo on june. she reportedly has seen by her primary cardiologist 1 week ago, with no problem reported. however she idid not have present problem last week.  PAtient follows with [facility], cannot mention her primary cardiologist name.    INERVAL: seen at bedside. denied complaints. not sure about dizziness as she was not OOB. Orthostasis not checked. seen by cardiology. Echo from the office 10/2018 with EF of 55-60%, moderate LVH, diastolic dysfunction, mild valvular abn., carotid duplex scan 10/18 with b/l mild <15% stenosis, nuclear stress test 10/12/18 with no ischemia. seen by neuro. suggested peripheral vertigo.     ROS:  CONSTITUTIONAL:  No distress.no fever/chills/fatigue/weight loss  HEENT:  Eyes:  No diplopia or blurred vision.   CARDIOVASCULAR:  No pressure, squeezing, tightness, heaviness or aching about the chest; no palpitations. no leg swelling, no orthopnea or PND  RESPIRATORY:  no SOB. no wheezing. no cough or sputum.  No hemoptysis  GI: no nausea, no vomiting, no diarrhea, no constipation. No hematochezia or melena  EXT:No joint pain or joint swelling or redness  SKIN: no skin break or ulcer. No cellulitis.   CNS: No headaches. No weakness. no numbness. No depression or anxiety. No SI    Vital Signs Last 24 Hrs  T(C): 36.7 (28 Oct 2018 08:46), Max: 36.8 (27 Oct 2018 17:06)  T(F): 98.1 (28 Oct 2018 08:46), Max: 98.2 (27 Oct 2018 17:06)  HR: 80 (28 Oct 2018 08:46) (68 - 88)  BP: 148/86 (28 Oct 2018 08:46) (142/85 - 151/96)  BP(mean): --  RR: 17 (28 Oct 2018 08:46) (17 - 20)  SpO2: 96% (28 Oct 2018 08:46) (92% - 98%)    CAPILLARY BLOOD GLUCOSE        Physical Exam:  GENERAL: Not in distress. Alert . patient reported feeling lightheaded after sitting up on bed. did not report any symptoms with head movement  HEENT:  Normocephalic and atraumatic. PEARLA,EOMI. ear,nose throat apparently normal. no sinus tenderness. hearing normal  NECK: Supple.  No JVD.    CARDIOVASCULAR: RRR S1, S2. No murmur/rubs/gallop. PPM left chest  LUNGS: BLAE+, no rales, no wheezing, no rhonchi.    ABDOMEN: ND. Soft,  NT, no guarding / rebound / rigidity. BS normoactive. No CVA tenderness.    EXTREMITIES: no cyanosis, no clubbing, no edema.   SKIN: no rash.   NEUROLOGIC: AAO*3.strength is symmetric, sensation intact, speech fluent.  reflex symmetric planter flexor. CN 2-12 normal  PSYCHIATRIC: Calm.  No agitation.    Labs                                   14.0   10.2  )-----------( 228      ( 26 Oct 2018 19:33 )             43.7       10-26    143  |  101  |  15.0  ----------------------------<  82  4.2   |  30.0<H>  |  0.59    Ca    9.5      26 Oct 2018 19:33  Mg     2.0     10-26    TPro  7.3  /  Alb  3.7  /  TBili  0.5  /  DBili  x   /  AST  20  /  ALT  9   /  AlkPhos  75  10-26    MEDICATIONS  (STANDING):  DULoxetine 20 milliGRAM(s) Oral daily  enoxaparin Injectable 40 milliGRAM(s) SubCutaneous daily  influenza   Vaccine 0.5 milliLiter(s) IntraMuscular once  mesalamine DR Capsule 400 milliGRAM(s) Oral three times a day  pantoprazole    Tablet 40 milliGRAM(s) Oral before breakfast  QUEtiapine 25 milliGRAM(s) Oral at bedtime  simvastatin 20 milliGRAM(s) Oral at bedtime    MEDICATIONS  (PRN):  acetaminophen   Tablet .. 650 milliGRAM(s) Oral every 6 hours PRN Mild Pain (1 - 3)  meclizine 12.5 milliGRAM(s) Oral every 8 hours PRN Dizziness        < from: CT Angio Neck w/ IV Cont (10.26.18 @ 22:40) >  IMPRESSION:    CT BRAIN:  No acute intracranial hemorrhage or mass effect. Chronic   changes noted    CTA NECK:  No significant stenosis of the cervical carotid or vertebral   arteries.    CTA HEAD:  No significant stenosis or vessel cutoff along the major   intracranial arterial vasculature    < end of copied text >    < from: TTE Echo Complete w/Doppler (06.21.18 @ 09:47) >    Summary:   1. Left ventricular ejection fraction, by visual estimation, is 55 to   60%.   2. Normal global left ventricular systolic function.   3. Spectral Doppler shows impaired relaxation pattern of left   ventricular myocardial filling (Grade I diastolic dysfunction).   4. Normal right ventricular size and function.   5. There is no evidence of pericardial effusion.   6. Moderate tricuspid regurgitation.   7. Mild to moderate aortic regurgitation.   8. Pulmonic valve regurgitation.   9. Estimated pulmonary artery systolic pressure is 39.7 mmHg assuming a   right atrial pressure of 3 mmHg, which is consistent with borderline   pulmonary hypertension.    [id number] Jarod Tucker MD, Electronically signed on 6/21/2018 at 12:56:45   PM    < end of copied text > Dr. Elias Hospitalist Progress Note  KARSTEN HALE [id number]    Patient is a 82y old  Female who presents with a chief complaint of dizziness (27 Oct 2018 04:09)    HPI:  83 y/o female with h/o pacemaker, Crohn's disease and rheumatoid arthritis, came to the ER because of dizziness attacks for the last 5 days. dizziness attacks are precipitated by getting up from bed. patient does not give a clear description if it is dizziness or vertigo. dizziness attacks are associated with nausea. no recent hearing change. no ear pain or discharge. no headache or weakness. (27 Oct 2018 04:09). CT head, CTA head and neck neg for acute/significant events. EKG showed paced beats. ENT cx was called. not seen as off 10/28. patient  reported symptoms better when she is lying flat with head end up. did not notice any relation with head movement. reported lightheadedness when sitting up, otherwise spining. symptoms started after she did "lots of house work". symptoms mostly related to acitivity and exertion. no CP/SOB/palpitation or feeling of skipped beats/LOC/abd pain/n/v/d/c/dysuria/headaches/vision or speech problem or limb weakness. no head injury.  no earaches/discharge/tinnitus/hearing impairment or fullness inside the ears. no sinus pain or congestion. all other ROS negative. patient reportedly had her PPM interrogation either aug or sept and had echo on june. she reportedly has seen by her primary cardiologist 1 week ago, with no problem reported. however she idid not have present problem last week.  PAtient follows with [facility], cannot mention her primary cardiologist name.    INERVAL: seen at bedside. denied complaints. not sure about dizziness as she was not OOB. Orthostasis not checked. seen by cardiology. Echo from the office 10/2018 with EF of 55-60%, moderate LVH, diastolic dysfunction, mild valvular abn., carotid duplex scan 10/18 with b/l mild <15% stenosis, nuclear stress test 10/12/18 with no ischemia. seen by neuro. suggested peripheral vertigo. as per  Mr. Hale, patient is wheelchair, does not walk and does move much, because of her RA/spinal stenosis,neck and back pain. She lives with her  who takes care of her. no HHA.  asking for help at home.     ROS:  CONSTITUTIONAL:  No distress.no fever/chills/fatigue/weight loss  HEENT:  Eyes:  No diplopia or blurred vision.   CARDIOVASCULAR:  No pressure, squeezing, tightness, heaviness or aching about the chest; no palpitations. no leg swelling, no orthopnea or PND  RESPIRATORY:  no SOB. no wheezing. no cough or sputum.  No hemoptysis  GI: no nausea, no vomiting, no diarrhea, no constipation. No hematochezia or melena  EXT:No joint pain or joint swelling or redness  SKIN: no skin break or ulcer. No cellulitis.   CNS: No headaches. No weakness. no numbness. No depression or anxiety. No SI    Vital Signs Last 24 Hrs  T(C): 36.7 (28 Oct 2018 08:46), Max: 36.8 (27 Oct 2018 17:06)  T(F): 98.1 (28 Oct 2018 08:46), Max: 98.2 (27 Oct 2018 17:06)  HR: 80 (28 Oct 2018 08:46) (68 - 88)  BP: 148/86 (28 Oct 2018 08:46) (142/85 - 151/96)  BP(mean): --  RR: 17 (28 Oct 2018 08:46) (17 - 20)  SpO2: 96% (28 Oct 2018 08:46) (92% - 98%)    CAPILLARY BLOOD GLUCOSE        Physical Exam:  GENERAL: Not in distress. Alert . patient reported feeling lightheaded after sitting up on bed. did not report any symptoms with head movement  HEENT:  Normocephalic and atraumatic. PEARLA,EOMI. ear,nose throat apparently normal. no sinus tenderness. hearing normal  NECK: Supple.  No JVD.    CARDIOVASCULAR: RRR S1, S2. No murmur/rubs/gallop. PPM left chest  LUNGS: BLAE+, no rales, no wheezing, no rhonchi.    ABDOMEN: ND. Soft,  NT, no guarding / rebound / rigidity. BS normoactive. No CVA tenderness.    EXTREMITIES: no cyanosis, no clubbing, no edema.   SKIN: no rash.   NEUROLOGIC: AAO*3.strength is symmetric, sensation intact, speech fluent.  reflex symmetric planter flexor. CN 2-12 normal  PSYCHIATRIC: Calm.  No agitation.    Labs                                   14.0   10.2  )-----------( 228      ( 26 Oct 2018 19:33 )             43.7       10-26    143  |  101  |  15.0  ----------------------------<  82  4.2   |  30.0<H>  |  0.59    Ca    9.5      26 Oct 2018 19:33  Mg     2.0     10-26    TPro  7.3  /  Alb  3.7  /  TBili  0.5  /  DBili  x   /  AST  20  /  ALT  9   /  AlkPhos  75  10-26    MEDICATIONS  (STANDING):  DULoxetine 20 milliGRAM(s) Oral daily  enoxaparin Injectable 40 milliGRAM(s) SubCutaneous daily  influenza   Vaccine 0.5 milliLiter(s) IntraMuscular once  mesalamine DR Capsule 400 milliGRAM(s) Oral three times a day  pantoprazole    Tablet 40 milliGRAM(s) Oral before breakfast  QUEtiapine 25 milliGRAM(s) Oral at bedtime  simvastatin 20 milliGRAM(s) Oral at bedtime    MEDICATIONS  (PRN):  acetaminophen   Tablet .. 650 milliGRAM(s) Oral every 6 hours PRN Mild Pain (1 - 3)  meclizine 12.5 milliGRAM(s) Oral every 8 hours PRN Dizziness        < from: CT Angio Neck w/ IV Cont (10.26.18 @ 22:40) >  IMPRESSION:    CT BRAIN:  No acute intracranial hemorrhage or mass effect. Chronic   changes noted    CTA NECK:  No significant stenosis of the cervical carotid or vertebral   arteries.    CTA HEAD:  No significant stenosis or vessel cutoff along the major   intracranial arterial vasculature    < end of copied text >    < from: TTE Echo Complete w/Doppler (06.21.18 @ 09:47) >    Summary:   1. Left ventricular ejection fraction, by visual estimation, is 55 to   60%.   2. Normal global left ventricular systolic function.   3. Spectral Doppler shows impaired relaxation pattern of left   ventricular myocardial filling (Grade I diastolic dysfunction).   4. Normal right ventricular size and function.   5. There is no evidence of pericardial effusion.   6. Moderate tricuspid regurgitation.   7. Mild to moderate aortic regurgitation.   8. Pulmonic valve regurgitation.   9. Estimated pulmonary artery systolic pressure is 39.7 mmHg assuming a   right atrial pressure of 3 mmHg, which is consistent with borderline   pulmonary hypertension.    [id number] Jarod Tucker MD, Electronically signed on 6/21/2018 at 12:56:45   PM    < end of copied text >

## 2018-10-28 NOTE — PROGRESS NOTE ADULT - ASSESSMENT
81 y/o female with dizziness, Rheumatoid arthritis, Crohn's disease. HTN a/w dizziness. CT head, CT angio head and neck negative has PPM on left chest. ?BPPV vs cardiac vs neuro.

## 2018-10-29 ENCOUNTER — TRANSCRIPTION ENCOUNTER (OUTPATIENT)
Age: 83
End: 2018-10-29

## 2018-10-29 LAB
ANION GAP SERPL CALC-SCNC: 14 MMOL/L — SIGNIFICANT CHANGE UP (ref 5–17)
APPEARANCE UR: CLEAR — SIGNIFICANT CHANGE UP
BACTERIA # UR AUTO: ABNORMAL
BILIRUB UR-MCNC: NEGATIVE — SIGNIFICANT CHANGE UP
BUN SERPL-MCNC: 17 MG/DL — SIGNIFICANT CHANGE UP (ref 8–20)
CALCIUM SERPL-MCNC: 9.3 MG/DL — SIGNIFICANT CHANGE UP (ref 8.6–10.2)
CHLORIDE SERPL-SCNC: 101 MMOL/L — SIGNIFICANT CHANGE UP (ref 98–107)
CO2 SERPL-SCNC: 27 MMOL/L — SIGNIFICANT CHANGE UP (ref 22–29)
COLOR SPEC: YELLOW — SIGNIFICANT CHANGE UP
CREAT SERPL-MCNC: 0.83 MG/DL — SIGNIFICANT CHANGE UP (ref 0.5–1.3)
DIFF PNL FLD: ABNORMAL
EPI CELLS # UR: SIGNIFICANT CHANGE UP
GLUCOSE SERPL-MCNC: 105 MG/DL — SIGNIFICANT CHANGE UP (ref 70–115)
GLUCOSE UR QL: NEGATIVE MG/DL — SIGNIFICANT CHANGE UP
HCT VFR BLD CALC: 44.6 % — SIGNIFICANT CHANGE UP (ref 37–47)
HGB BLD-MCNC: 14.3 G/DL — SIGNIFICANT CHANGE UP (ref 12–16)
KETONES UR-MCNC: NEGATIVE — SIGNIFICANT CHANGE UP
LEUKOCYTE ESTERASE UR-ACNC: ABNORMAL
MCHC RBC-ENTMCNC: 29.7 PG — SIGNIFICANT CHANGE UP (ref 27–31)
MCHC RBC-ENTMCNC: 32.1 G/DL — SIGNIFICANT CHANGE UP (ref 32–36)
MCV RBC AUTO: 92.5 FL — SIGNIFICANT CHANGE UP (ref 81–99)
NITRITE UR-MCNC: NEGATIVE — SIGNIFICANT CHANGE UP
PH UR: 6 — SIGNIFICANT CHANGE UP (ref 5–8)
PLATELET # BLD AUTO: 227 K/UL — SIGNIFICANT CHANGE UP (ref 150–400)
POTASSIUM SERPL-MCNC: 3.8 MMOL/L — SIGNIFICANT CHANGE UP (ref 3.5–5.3)
POTASSIUM SERPL-SCNC: 3.8 MMOL/L — SIGNIFICANT CHANGE UP (ref 3.5–5.3)
PROT UR-MCNC: 15 MG/DL
RBC # BLD: 4.82 M/UL — SIGNIFICANT CHANGE UP (ref 4.4–5.2)
RBC # FLD: 14.3 % — SIGNIFICANT CHANGE UP (ref 11–15.6)
RBC CASTS # UR COMP ASSIST: ABNORMAL /HPF (ref 0–4)
SODIUM SERPL-SCNC: 142 MMOL/L — SIGNIFICANT CHANGE UP (ref 135–145)
SP GR SPEC: 1.01 — SIGNIFICANT CHANGE UP (ref 1.01–1.02)
UROBILINOGEN FLD QL: NEGATIVE MG/DL — SIGNIFICANT CHANGE UP
WBC # BLD: 8.4 K/UL — SIGNIFICANT CHANGE UP (ref 4.8–10.8)
WBC # FLD AUTO: 8.4 K/UL — SIGNIFICANT CHANGE UP (ref 4.8–10.8)
WBC UR QL: ABNORMAL

## 2018-10-29 PROCEDURE — 99232 SBSQ HOSP IP/OBS MODERATE 35: CPT

## 2018-10-29 RX ORDER — MECLIZINE HCL 12.5 MG
1 TABLET ORAL
Qty: 0 | Refills: 0 | COMMUNITY
Start: 2018-10-29

## 2018-10-29 RX ORDER — CIPROFLOXACIN LACTATE 400MG/40ML
1 VIAL (ML) INTRAVENOUS
Qty: 2 | Refills: 0 | OUTPATIENT
Start: 2018-10-29 | End: 2018-10-30

## 2018-10-29 RX ORDER — CEFTRIAXONE 500 MG/1
1 INJECTION, POWDER, FOR SOLUTION INTRAMUSCULAR; INTRAVENOUS ONCE
Qty: 0 | Refills: 0 | Status: COMPLETED | OUTPATIENT
Start: 2018-10-29 | End: 2018-10-29

## 2018-10-29 RX ORDER — CEFTRIAXONE 500 MG/1
INJECTION, POWDER, FOR SOLUTION INTRAMUSCULAR; INTRAVENOUS
Qty: 0 | Refills: 0 | Status: DISCONTINUED | OUTPATIENT
Start: 2018-10-29 | End: 2018-10-29

## 2018-10-29 RX ADMIN — SIMVASTATIN 20 MILLIGRAM(S): 20 TABLET, FILM COATED ORAL at 21:49

## 2018-10-29 RX ADMIN — Medication 400 MILLIGRAM(S): at 21:49

## 2018-10-29 RX ADMIN — PANTOPRAZOLE SODIUM 40 MILLIGRAM(S): 20 TABLET, DELAYED RELEASE ORAL at 05:10

## 2018-10-29 RX ADMIN — Medication 400 MILLIGRAM(S): at 05:10

## 2018-10-29 RX ADMIN — QUETIAPINE FUMARATE 25 MILLIGRAM(S): 200 TABLET, FILM COATED ORAL at 21:49

## 2018-10-29 RX ADMIN — Medication 400 MILLIGRAM(S): at 12:25

## 2018-10-29 RX ADMIN — ENOXAPARIN SODIUM 40 MILLIGRAM(S): 100 INJECTION SUBCUTANEOUS at 12:19

## 2018-10-29 RX ADMIN — DULOXETINE HYDROCHLORIDE 20 MILLIGRAM(S): 30 CAPSULE, DELAYED RELEASE ORAL at 12:19

## 2018-10-29 NOTE — DISCHARGE NOTE ADULT - HOSPITAL COURSE
83 y/o female with h/o pacemaker, Crohn's disease and rheumatoid arthritis, came to the ER because of dizziness; attacks are precipitated by getting up from bed. patient does not give a clear description if it is dizziness or vertigo. dizziness attacks are associated with nausea.     patient admitted to medicine and seen by cardio, carotids were negative and tte showed ef 45%. patient admits to drinking less than 2 cups of water secoondary to incontinence. patient urged to consume more fluiids    patient seen by pt and will go to [facility] and she is agreeable    time spent on dc 32 mintues

## 2018-10-29 NOTE — PHYSICAL THERAPY INITIAL EVALUATION ADULT - IMPAIRMENTS CONTRIBUTING TO GAIT DEVIATIONS, PT EVAL
decreased strength/+ Left knee buckling, decreased clearance and step length of RLE, Assist needed with RW management, Pt c/o dizziness at end of gait assessment, increased assist needed to maintain safety./impaired balance/impaired postural control

## 2018-10-29 NOTE — DISCHARGE NOTE ADULT - PROVIDER TOKENS
FREE:[LAST:[[email or web address]],PHONE:[(   )    -],FAX:[(   )    -],ADDRESS:[[facility]]],TOKEN:'[id number]'

## 2018-10-29 NOTE — PHYSICAL THERAPY INITIAL EVALUATION ADULT - PERTINENT HX OF CURRENT PROBLEM, REHAB EVAL
82y old  Female h/o pacemaker, Crohn's disease and rheumatoid arthritis who presents with a chief complaint of dizziness. Cardiology consulted, CT head, CTA head and neck neg for acute/significant events. EKG showed paced beats. seen by Neuro. suggested peripheral vertigo. Pt is pending consult from ENT

## 2018-10-29 NOTE — DISCHARGE NOTE ADULT - PLAN OF CARE
secondary to dehydration and intravscualr depletion encouraged po intake home meds follow up with pcp follow up with cardio not in acute failure

## 2018-10-29 NOTE — DISCHARGE NOTE ADULT - MEDICATION SUMMARY - MEDICATIONS TO STOP TAKING
I will STOP taking the medications listed below when I get home from the hospital:    Vantin 200 mg oral tablet  -- 1 tab(s) by mouth every 12 hours - 10 days course

## 2018-10-29 NOTE — DISCHARGE NOTE ADULT - PATIENT PORTAL LINK FT
You can access the [facility], offered by [facility], by registering with the following website: [email or web address]

## 2018-10-29 NOTE — PHYSICAL THERAPY INITIAL EVALUATION ADULT - ADDITIONAL COMMENTS
Pt lives in a private home with her spouse. 2-3 steps to enter via garage without handrails, no steps inside. Pt states she was minimally ambulatory PTA. States she would "lean on things" despite having RW at home and primarily got around in w/c using LEs for propulsion. Spouse would assist her on stairs and with ADLs. Pt owns RW and w/c.

## 2018-10-29 NOTE — PROGRESS NOTE ADULT - SUBJECTIVE AND OBJECTIVE BOX
KARSTEN HALE    [id number]    82y      Female    INTERVAL HPI/OVERNIGHT EVENTS:    patient being seen for dizziness and med management. patient seen at bedside and complains of weakness    REVIEW OF SYSTEMS:    CONSTITUTIONAL: weakness  RESPIRATORY: No cough, wheezing, hemoptysis; No shortness of breath  CARDIOVASCULAR: No chest pain, palpitations  GASTROINTESTINAL: No abdominal or epigastric pain. No nausea, vomiting  NEUROLOGICAL: No headaches, memory loss, loss of strength.  MISCELLANEOUS:      Vital Signs Last 24 Hrs  T(C): 36.4 (30 Oct 2018 04:29), Max: 36.8 (29 Oct 2018 16:05)  T(F): 97.5 (30 Oct 2018 04:29), Max: 98.3 (29 Oct 2018 20:33)  HR: 76 (30 Oct 2018 04:29) (59 - 83)  BP: 124/68 (30 Oct 2018 04:29) (101/66 - 128/48)  BP(mean): --  RR: 18 (29 Oct 2018 22:00) (18 - 178)  SpO2: 97% (29 Oct 2018 22:00) (94% - 98%)    PHYSICAL EXAM:    GENERAL: Not in distress. Alert  HEENT:  Normocephalic and atraumatic.   NECK: Supple.  No JVD.    CARDIOVASCULAR: RRR S1, S2. No murmur  LUNGS: BLAE+, no rales, no wheezing, no rhonchi.    ABDOMEN: ND. Soft,  NT,  EXTREMITIES: no edema.   SKIN: no rash.   NEUROLOGIC: AAO*3.strength is symmetric,  PSYCHIATRIC: Calm.  No agitation.    LABS:                        14.3   8.4   )-----------( 227      ( 29 Oct 2018 07:58 )             44.6     10-    142  |  101  |  17.0  ----------------------------<  105  3.8   |  27.0  |  0.83    Ca    9.3      29 Oct 2018 07:58        Urinalysis Basic - ( 29 Oct 2018 07:19 )    Color: Yellow / Appearance: Clear / S.010 / pH: x  Gluc: x / Ketone: Negative  / Bili: Negative / Urobili: Negative mg/dL   Blood: x / Protein: 15 mg/dL / Nitrite: Negative   Leuk Esterase: Moderate / RBC: 11-25 /HPF / WBC 26-50   Sq Epi: x / Non Sq Epi: Few / Bacteria: Few          MEDICATIONS  (STANDING):  DULoxetine 20 milliGRAM(s) Oral daily  enoxaparin Injectable 40 milliGRAM(s) SubCutaneous daily  levoFLOXacin  Tablet 500 milliGRAM(s) Oral every 24 hours  mesalamine DR Capsule 400 milliGRAM(s) Oral three times a day  pantoprazole    Tablet 40 milliGRAM(s) Oral before breakfast  QUEtiapine 25 milliGRAM(s) Oral at bedtime  simvastatin 20 milliGRAM(s) Oral at bedtime    MEDICATIONS  (PRN):  acetaminophen   Tablet .. 650 milliGRAM(s) Oral every 6 hours PRN Mild Pain (1 - 3)  meclizine 12.5 milliGRAM(s) Oral every 8 hours PRN Dizziness      RADIOLOGY & ADDITIONAL TESTS:

## 2018-10-29 NOTE — DISCHARGE NOTE ADULT - CARE PLAN
Principal Discharge DX:	Dizziness  Goal:	secondary to dehydration and intravscualr depletion  Assessment and plan of treatment:	encouraged po intake  Secondary Diagnosis:	Essential hypertension  Goal:	home meds  Secondary Diagnosis:	High cholesterol  Goal:	home meds  Secondary Diagnosis:	Depression  Goal:	home meds  Secondary Diagnosis:	Rheumatoid arteritis  Goal:	follow up with pcp  Secondary Diagnosis:	Crohn disease  Goal:	follow up with pcp  Secondary Diagnosis:	Chronic systolic (congestive) heart failure  Goal:	follow up with cardio  Assessment and plan of treatment:	not in acute failure

## 2018-10-29 NOTE — DISCHARGE NOTE ADULT - MEDICATION SUMMARY - MEDICATIONS TO TAKE
I will START or STAY ON the medications listed below when I get home from the hospital:    mesalamine 400 mg oral delayed release capsule  -- 1 cap(s) by mouth 3 times a day  -- Indication: For home med    indomethacin 25 mg oral capsule  -- 1 cap(s) by mouth 2 times a day  -- Indication: For pain    DULoxetine 20 mg oral delayed release capsule  -- 1 cap(s) by mouth once a day  -- Indication: For Depression    meclizine 12.5 mg oral tablet  -- 1 tab(s) by mouth every 8 hours, As needed, Dizziness  -- Indication: For Dizziness    simvastatin 20 mg oral tablet  -- 1 tab(s) by mouth once a day (at bedtime)  -- Indication: For hyperlipidmiea    QUEtiapine 25 mg oral tablet  -- 1 tab(s) by mouth once a day (at bedtime)  -- Indication: For home med    zolpidem 5 mg oral tablet  -- 1 tab(s) by mouth once a day (at bedtime), As needed, Insomnia  -- Indication: For insomnia    amLODIPine 5 mg oral tablet  -- 1 tab(s) by mouth once a day  -- Indication: For htn    saccharomyces boulardii lyo 250 mg oral capsule  -- 1 cap(s) by mouth 2 times a day  -- Indication: For probiotic    pantoprazole 40 mg oral delayed release tablet  -- 1 tab(s) by mouth once a day (before a meal)  -- Indication: For gerd    Levaquin 500 mg oral tablet  -- 1 tab(s) by mouth every 24 hours   -- Avoid prolonged or excessive exposure to direct and/or artificial sunlight while taking this medication.  Do not take dairy products, antacids, or iron preparations within one hour of this medication.  Finish all this medication unless otherwise directed by prescriber.  May cause drowsiness or dizziness.  Medication should be taken with plenty of water.    -- Indication: For uti    Toviaz 4 mg oral tablet, extended release  -- 1 tab(s) by mouth once a day  -- Indication: For bladdeer spasm

## 2018-10-29 NOTE — PROGRESS NOTE ADULT - SUBJECTIVE AND OBJECTIVE BOX
INTERVAL HISTORY: Denies New symptoms  	  MEDICATIONS:  acetaminophen   Tablet .. 650 milliGRAM(s) Oral every 6 hours PRN  DULoxetine 20 milliGRAM(s) Oral daily  meclizine 12.5 milliGRAM(s) Oral every 8 hours PRN  QUEtiapine 25 milliGRAM(s) Oral at bedtime  mesalamine DR Capsule 400 milliGRAM(s) Oral three times a day  pantoprazole    Tablet 40 milliGRAM(s) Oral before breakfast  simvastatin 20 milliGRAM(s) Oral at bedtime  enoxaparin Injectable 40 milliGRAM(s) SubCutaneous daily  influenza   Vaccine 0.5 milliLiter(s) IntraMuscular once        PHYSICAL EXAM:  T(C): 36.8 (10-29-18 @ 08:07), Max: 36.8 (10-29-18 @ 00:02)  HR: 73 (10-29-18 @ 08:07) (73 - 80)  BP: 125/82 (10-29-18 @ 08:07) (125/82 - 139/81)  RR: 18 (10-29-18 @ 08:07) (18 - 20)  SpO2: 95% (10-29-18 @ 08:07) (94% - 97%)  Wt(kg): --  I&O's Summary        Appearance: Normal	  Cardiovascular: Normal S1 S2, No JVD, No murmurs, No edema  Respiratory: Lungs clear to auscultation	  Psychiatry: A & O x 3, Mood & affect appropriate  Gastrointestinal:  Soft, Non-tender, + BS	  Skin: No rashes, No ecchymoses, No cyanosis  Neurologic: Non-focal  Extremities: Normal range of motion, No clubbing, cyanosis or edema  Vascular: Peripheral pulses palpable 2+ bilaterally    TELEMETRY: paced	        LABS:	 	                   14.3   8.4   )-----------( 227      ( 29 Oct 2018 07:58 )             44.6     10-29    142  |  101  |  17.0  ----------------------------<  105  3.8   |  27.0  |  0.83    Ca    9.3      29 Oct 2018 07:58      ASSESSMENT/PLAN: 	KARSTEN HALE is a 82y Female with PMH of HL, borderline HTN, spinal stenosis with spinal surgeries in the past, 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 Dizziness  precipitated by getting up from bed. Comfortable in the bed at present time with no new c/o. Echo from the office 10/2018 with EF of 55-60%, moderate LVH, diastolic dysfunction, mild valvular abn., carotid duplex scan 10/18 with b/l mild <15% stenosis, nuclear stress test 10/12/18 with no ischemia.   No significant cardiac issues at this time.  - Monitor on telemetry  - Orthostatics  - Further work-up & testing (EEG, etc.) per neurology   - Pending ENT eval  - No evidence of ischemia or CHF clinically  - Rhythm/hemodynamics stable, continue current meds/doses for now and titrate PRN

## 2018-10-29 NOTE — PROGRESS NOTE ADULT - ASSESSMENT
81 y/o female with dizziness, Rheumatoid arthritis, Crohn's disease. HTN a/w dizziness. CT head, CT angio head and neck negative has PPM on left chest. presents with dizziness        Problem/Plan - 1:  ·  Problem: Dizziness. follow up with ent  --> resovled  advised more fluid intake      Problem/Plan - 2:  ·  Problem: Rheumatoid arteritis.  Plan: Tylenol prn.      Problem/Plan - 3:  ·  Problem: Crohn's disease with complication, unspecified gastrointestinal tract location.  Plan: Mesalamine.      Problem/Plan - 4:  ·  Problem: Essential hypertension.  Plan: hold amlodipine for now. target BP: 150/90 now. can restart on DC.     Attending Attestation:   DVT: Lovenox as patients is not ambulating    dc to [facility]

## 2018-10-29 NOTE — DISCHARGE NOTE ADULT - CARE PROVIDER_API CALL
scot menjivar  Phone: (   )    -  Fax: (   )    -    Dedrick Rodriguez), Cardiovascular Disease; Internal Medicine  [address] [place] [address]  Phone: [phone]  Fax: [phone]

## 2018-10-30 VITALS
RESPIRATION RATE: 18 BRPM | TEMPERATURE: 98 F | DIASTOLIC BLOOD PRESSURE: 70 MMHG | HEART RATE: 70 BPM | OXYGEN SATURATION: 98 % | SYSTOLIC BLOOD PRESSURE: 122 MMHG

## 2018-10-30 PROCEDURE — 99239 HOSP IP/OBS DSCHRG MGMT >30: CPT

## 2018-10-30 RX ADMIN — Medication 400 MILLIGRAM(S): at 13:14

## 2018-10-30 RX ADMIN — DULOXETINE HYDROCHLORIDE 20 MILLIGRAM(S): 30 CAPSULE, DELAYED RELEASE ORAL at 13:15

## 2018-10-30 RX ADMIN — PANTOPRAZOLE SODIUM 40 MILLIGRAM(S): 20 TABLET, DELAYED RELEASE ORAL at 06:19

## 2018-10-30 RX ADMIN — Medication 400 MILLIGRAM(S): at 06:19

## 2018-10-30 RX ADMIN — ENOXAPARIN SODIUM 40 MILLIGRAM(S): 100 INJECTION SUBCUTANEOUS at 13:14

## 2018-10-30 NOTE — PROGRESS NOTE ADULT - SUBJECTIVE AND OBJECTIVE BOX
INTERVAL HISTORY: Denies CP, SOB, palpitation.  	  MEDICATIONS:    levoFLOXacin  Tablet 500 milliGRAM(s) Oral every 24 hours  acetaminophen   Tablet .. 650 milliGRAM(s) Oral every 6 hours PRN  DULoxetine 20 milliGRAM(s) Oral daily  meclizine 12.5 milliGRAM(s) Oral every 8 hours PRN  QUEtiapine 25 milliGRAM(s) Oral at bedtime  mesalamine DR Capsule 400 milliGRAM(s) Oral three times a day  pantoprazole    Tablet 40 milliGRAM(s) Oral before breakfast  simvastatin 20 milliGRAM(s) Oral at bedtime  enoxaparin Injectable 40 milliGRAM(s) SubCutaneous daily        PHYSICAL EXAM:  T(C): 36.4 (10-30-18 @ 04:29), Max: 36.8 (10-29-18 @ 16:05)  HR: 76 (10-30-18 @ 04:29) (59 - 83)  BP: 124/68 (10-30-18 @ 04:29) (101/66 - 128/48)  RR: 18 (10-29-18 @ 22:00) (18 - 178)  SpO2: 97% (10-29-18 @ 22:00) (94% - 98%)  Wt(kg): --  I&O's Summary    29 Oct 2018 07:01  -  30 Oct 2018 07:00  --------------------------------------------------------  IN: 240 mL / OUT: 0 mL / NET: 240 mL          Appearance: Normal	  HEENT:   Normal oral mucosa  Cardiovascular: Normal S1 S2, No JVD, No murmurs, No edema  Respiratory: Lungs clear to auscultation	  Psychiatry: A & O x 3, Mood & affect appropriate  Gastrointestinal:  Soft, Non-tender, + BS	  Skin: No rashes, No ecchymoses, No cyanosis  Neurologic: Non-focal  Extremities: Normal range of motion, No clubbing, cyanosis or edema  Vascular: Peripheral pulses palpable 2+ bilaterally    	    LABS:	 	                          14.3   8.4   )-----------( 227      ( 29 Oct 2018 07:58 )             44.6     10-29    142  |  101  |  17.0  ----------------------------<  105  3.8   |  27.0  |  0.83    Ca    9.3      29 Oct 2018 07:58        ASSESSMENT/PLAN: KARSTEN HALE is a 82y Female with PMH of HL, borderline HTN, spinal stenosis with spinal surgeries in the past, 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 Dizziness  precipitated by getting up from bed. Comfortable in the bed at present time with no new c/o. Echo with EF of 45-50%, moderate LVH, diastolic dysfunction, mild valvular abn., carotid duplex scan 10/18 with b/l mild <15% stenosis, nuclear stress test 10/12/18 with no ischemia.   No significant cardiac issues at this time.  No evidence of ischemia or CHF clinically  Rhythm/hemodynamics stable, continue current meds/doses for now and titrate PRN

## 2018-10-31 LAB
-  AMPICILLIN: SIGNIFICANT CHANGE UP
-  DAPTOMYCIN: SIGNIFICANT CHANGE UP
-  LEVOFLOXACIN: SIGNIFICANT CHANGE UP
-  LINEZOLID: SIGNIFICANT CHANGE UP
-  NITROFURANTOIN: SIGNIFICANT CHANGE UP
-  PENICILLIN: SIGNIFICANT CHANGE UP
-  TETRACYCLINE: SIGNIFICANT CHANGE UP
-  VANCOMYCIN: SIGNIFICANT CHANGE UP
CULTURE RESULTS: SIGNIFICANT CHANGE UP
METHOD TYPE: SIGNIFICANT CHANGE UP
ORGANISM # SPEC MICROSCOPIC CNT: SIGNIFICANT CHANGE UP
ORGANISM # SPEC MICROSCOPIC CNT: SIGNIFICANT CHANGE UP
SPECIMEN SOURCE: SIGNIFICANT CHANGE UP

## 2018-11-11 PROCEDURE — 83735 ASSAY OF MAGNESIUM: CPT

## 2018-11-11 PROCEDURE — 84443 ASSAY THYROID STIM HORMONE: CPT

## 2018-11-11 PROCEDURE — 93880 EXTRACRANIAL BILAT STUDY: CPT

## 2018-11-11 PROCEDURE — 70498 CT ANGIOGRAPHY NECK: CPT

## 2018-11-11 PROCEDURE — 36415 COLL VENOUS BLD VENIPUNCTURE: CPT

## 2018-11-11 PROCEDURE — 71045 X-RAY EXAM CHEST 1 VIEW: CPT

## 2018-11-11 PROCEDURE — 93005 ELECTROCARDIOGRAM TRACING: CPT

## 2018-11-11 PROCEDURE — 97163 PT EVAL HIGH COMPLEX 45 MIN: CPT

## 2018-11-11 PROCEDURE — 70496 CT ANGIOGRAPHY HEAD: CPT

## 2018-11-11 PROCEDURE — 85027 COMPLETE CBC AUTOMATED: CPT

## 2018-11-11 PROCEDURE — 83880 ASSAY OF NATRIURETIC PEPTIDE: CPT

## 2018-11-11 PROCEDURE — 87186 SC STD MICRODIL/AGAR DIL: CPT

## 2018-11-11 PROCEDURE — 87086 URINE CULTURE/COLONY COUNT: CPT

## 2018-11-11 PROCEDURE — 80048 BASIC METABOLIC PNL TOTAL CA: CPT

## 2018-11-11 PROCEDURE — 93306 TTE W/DOPPLER COMPLETE: CPT

## 2018-11-11 PROCEDURE — 80053 COMPREHEN METABOLIC PANEL: CPT

## 2018-11-11 PROCEDURE — 81001 URINALYSIS AUTO W/SCOPE: CPT

## 2018-11-11 PROCEDURE — 84480 ASSAY TRIIODOTHYRONINE (T3): CPT

## 2018-11-11 PROCEDURE — 84436 ASSAY OF TOTAL THYROXINE: CPT

## 2018-11-11 PROCEDURE — 99285 EMERGENCY DEPT VISIT HI MDM: CPT | Mod: 25

## 2018-11-11 PROCEDURE — 83605 ASSAY OF LACTIC ACID: CPT

## 2018-12-21 ENCOUNTER — INPATIENT (INPATIENT)
Facility: HOSPITAL | Age: 83
LOS: 5 days | Discharge: HOME | DRG: 149 | End: 2018-12-27
Attending: INTERNAL MEDICINE | Admitting: INTERNAL MEDICINE
Payer: MEDICARE

## 2018-12-21 VITALS
OXYGEN SATURATION: 97 % | RESPIRATION RATE: 16 BRPM | SYSTOLIC BLOOD PRESSURE: 149 MMHG | DIASTOLIC BLOOD PRESSURE: 85 MMHG | TEMPERATURE: 97 F | HEART RATE: 81 BPM

## 2018-12-21 DIAGNOSIS — Z95.0 PRESENCE OF CARDIAC PACEMAKER: Chronic | ICD-10-CM

## 2018-12-21 LAB
ALBUMIN SERPL ELPH-MCNC: 3.5 G/DL — SIGNIFICANT CHANGE UP (ref 3.3–5.2)
ALP SERPL-CCNC: 74 U/L — SIGNIFICANT CHANGE UP (ref 40–120)
ALT FLD-CCNC: 10 U/L — SIGNIFICANT CHANGE UP
ANION GAP SERPL CALC-SCNC: 11 MMOL/L — SIGNIFICANT CHANGE UP (ref 5–17)
AST SERPL-CCNC: 20 U/L — SIGNIFICANT CHANGE UP
BASOPHILS # BLD AUTO: 0 K/UL — SIGNIFICANT CHANGE UP (ref 0–0.2)
BASOPHILS NFR BLD AUTO: 0.2 % — SIGNIFICANT CHANGE UP (ref 0–2)
BILIRUB SERPL-MCNC: 0.3 MG/DL — LOW (ref 0.4–2)
BUN SERPL-MCNC: 15 MG/DL — SIGNIFICANT CHANGE UP (ref 8–20)
CALCIUM SERPL-MCNC: 8.9 MG/DL — SIGNIFICANT CHANGE UP (ref 8.6–10.2)
CHLORIDE SERPL-SCNC: 105 MMOL/L — SIGNIFICANT CHANGE UP (ref 98–107)
CO2 SERPL-SCNC: 26 MMOL/L — SIGNIFICANT CHANGE UP (ref 22–29)
CREAT SERPL-MCNC: 0.63 MG/DL — SIGNIFICANT CHANGE UP (ref 0.5–1.3)
EOSINOPHIL # BLD AUTO: 0.2 K/UL — SIGNIFICANT CHANGE UP (ref 0–0.5)
EOSINOPHIL NFR BLD AUTO: 1.6 % — SIGNIFICANT CHANGE UP (ref 0–6)
GLUCOSE SERPL-MCNC: 90 MG/DL — SIGNIFICANT CHANGE UP (ref 70–115)
HCT VFR BLD CALC: 42.8 % — SIGNIFICANT CHANGE UP (ref 37–47)
HGB BLD-MCNC: 13.3 G/DL — SIGNIFICANT CHANGE UP (ref 12–16)
LYMPHOCYTES # BLD AUTO: 2.7 K/UL — SIGNIFICANT CHANGE UP (ref 1–4.8)
LYMPHOCYTES # BLD AUTO: 24.3 % — SIGNIFICANT CHANGE UP (ref 20–55)
MCHC RBC-ENTMCNC: 28.9 PG — SIGNIFICANT CHANGE UP (ref 27–31)
MCHC RBC-ENTMCNC: 31.1 G/DL — LOW (ref 32–36)
MCV RBC AUTO: 93 FL — SIGNIFICANT CHANGE UP (ref 81–99)
MONOCYTES # BLD AUTO: 1 K/UL — HIGH (ref 0–0.8)
MONOCYTES NFR BLD AUTO: 9 % — SIGNIFICANT CHANGE UP (ref 3–10)
NEUTROPHILS # BLD AUTO: 7.1 K/UL — SIGNIFICANT CHANGE UP (ref 1.8–8)
NEUTROPHILS NFR BLD AUTO: 64.8 % — SIGNIFICANT CHANGE UP (ref 37–73)
PLATELET # BLD AUTO: 248 K/UL — SIGNIFICANT CHANGE UP (ref 150–400)
POTASSIUM SERPL-MCNC: 4 MMOL/L — SIGNIFICANT CHANGE UP (ref 3.5–5.3)
POTASSIUM SERPL-SCNC: 4 MMOL/L — SIGNIFICANT CHANGE UP (ref 3.5–5.3)
PROT SERPL-MCNC: 6.9 G/DL — SIGNIFICANT CHANGE UP (ref 6.6–8.7)
RBC # BLD: 4.6 M/UL — SIGNIFICANT CHANGE UP (ref 4.4–5.2)
RBC # FLD: 15.9 % — HIGH (ref 11–15.6)
SODIUM SERPL-SCNC: 142 MMOL/L — SIGNIFICANT CHANGE UP (ref 135–145)
WBC # BLD: 10.9 K/UL — HIGH (ref 4.8–10.8)
WBC # FLD AUTO: 10.9 K/UL — HIGH (ref 4.8–10.8)

## 2018-12-21 PROCEDURE — 93010 ELECTROCARDIOGRAM REPORT: CPT

## 2018-12-21 PROCEDURE — 99285 EMERGENCY DEPT VISIT HI MDM: CPT

## 2018-12-21 RX ORDER — MECLIZINE HCL 12.5 MG
12.5 TABLET ORAL ONCE
Qty: 0 | Refills: 0 | Status: COMPLETED | OUTPATIENT
Start: 2018-12-21 | End: 2018-12-21

## 2018-12-21 RX ADMIN — Medication 12.5 MILLIGRAM(S): at 21:33

## 2018-12-21 NOTE — ED ADULT NURSE NOTE - OBJECTIVE STATEMENT
Patient arrived to the ED from home, patient states that she has been having intermittent dizziness for the last few months and is taking meclizine. Patient states that she has been having incontinence for the last couple months and has not been hydrating at home to prevent her from having to go to the bathroom. Patient states that her  was a trauma here this morning and is in the ICU, patient states that she did not want to be home alone. Patient denies any dizziness while laying down at this time.

## 2018-12-21 NOTE — ED PROVIDER NOTE - ATTENDING CONTRIBUTION TO CARE
Pt. well appearing. NO acute distress. Lungs are clear. Abdomen is soft/NT. NO focal deficit. I have discussed the plan with the resident.

## 2018-12-21 NOTE — ED PROVIDER NOTE - CHPI ED SYMPTOMS NEG
no blurred vision/no fever/no loss of consciousness/no nausea/no numbness/no syncope/no change in level of consciousness/no chills/no vomiting

## 2018-12-21 NOTE — ED ADULT NURSE NOTE - CHPI ED NUR SYMPTOMS NEG
no numbness/no change in level of consciousness/no confusion/no vomiting/no loss of consciousness/no fever/no weakness/no nausea/no blurred vision

## 2018-12-21 NOTE — ED PROVIDER NOTE - MEDICAL DECISION MAKING DETAILS
patient with h/o dizziness for the last 2 months, recent CT angio of head and neck showing no hemorrhage or mass effect or stenosis, will Po hydrate and evaluate cbc, cmp.

## 2018-12-21 NOTE — ED ADULT NURSE NOTE - NSIMPLEMENTINTERV_GEN_ALL_ED
Implemented All Universal Safety Interventions:  [place] to call system. Call bell, personal items and telephone within reach. Instruct patient to call for assistance. Room bathroom lighting operational. Non-slip footwear when patient is off stretcher. Physically safe environment: no spills, clutter or unnecessary equipment. Stretcher in lowest position, wheels locked, appropriate side rails in place.

## 2018-12-21 NOTE — ED PROVIDER NOTE - PROGRESS NOTE DETAILS
Patient evaluated at bedside, states feeling better. Patient was informed that lab work resulted between normal limits and will be discharged. Patient was recommended to increase po water intake as symptoms are probably secondary to dehydration.   Patient expresses understanding and states she has nobody to pick her up and she would prefer to stay with her  (currently in ICU). Will contact [facility].  Kimberly Lee MD PGY-1    Discussed with: Dr. Nugent. Attempted communication with son YAYO Johnson on phone number [phone] in 4 occasions without success, left voice message. Patient will not be discharge at the time due to disposition issues.   Kimberly Lee MD PGY-1    Discussed with: Dr. Nugent. Pt. was resting very comfortably. Pt. states that she started to feel dizzy. PT. also asking for food. Will repeat dose of meclizine. Suzanne: pt feels better, neuro intact, well appearing. However,  is primary caregiver and he is currently pt in MICU. Pt unable top care for self. SW involved, spoke with family, they would like long term palcement, and sicne this will nto occur for several days, will need to be admitted

## 2018-12-21 NOTE — ED PROVIDER NOTE - PMH
Cardiac disease  pacemaker 2007  Crohn disease    Depression    High cholesterol    Incontinence of urine in female    Pacemaker, artificial  For the last 10 years, was changed on April 2018  [facility]  Rheumatoid arthritis

## 2018-12-21 NOTE — ED PROVIDER NOTE - OBJECTIVE STATEMENT
84 yo female with past medical history of pacemaker, Crohn's disease and rheumatoid arthritis, presenting with chief complain of dizziness.  Patient states she has been presenting intermittent episodes of dizziness for the last month and a half, which patient describes as the room spinning around her that start with changes in position, specially from seating to standing. Patient states she has been eating and drinking less water than she usually does because her  has been admitted to the hospital and because her incontinence. Patient denies chest pain, palpitation SOB, nausea, vomiting, tinnitus, earing loss, visual disturbances associated with the episodes. Patient also denies recent upper respiratory tract infections of prior episodes in the past. 82 yo female with past medical history of pacemaker, Crohn's disease and rheumatoid arthritis, on wheelchair for the last 20 years secondary to "multiple spinal lesions" presenting with chief complain of dizziness.  Patient states she has been presenting intermittent episodes of dizziness for the last month and a half, which patient describes as the room spinning around her that start with changes in position, specially from seating to standing. Patient states she has been eating and drinking less water than she usually does because her  has been admitted to the hospital and because her incontinence. Patient denies chest pain, palpitation SOB, nausea, vomiting, tinnitus, earing loss, visual disturbances associated with the episodes. Patient also denies recent upper respiratory tract infections of prior episodes in the past.

## 2018-12-21 NOTE — ED PROVIDER NOTE - PHYSICAL EXAMINATION
ICU Vital Signs Last 24 Hrs  T(C): 36.2 (21 Dec 2018 16:29), Max: 36.2 (21 Dec 2018 16:29)  T(F): 97.2 (21 Dec 2018 16:29), Max: 97.2 (21 Dec 2018 16:29)  HR: 81 (21 Dec 2018 16:29) (81 - 81)  BP: 149/85 (21 Dec 2018 16:29) (149/85 - 149/85)  RR: 16 (21 Dec 2018 16:29) (16 - 16)  SpO2: 97% (21 Dec 2018 16:29) (97% - 97%)

## 2018-12-22 DIAGNOSIS — R42 DIZZINESS AND GIDDINESS: ICD-10-CM

## 2018-12-22 PROCEDURE — 99222 1ST HOSP IP/OBS MODERATE 55: CPT

## 2018-12-22 RX ORDER — AMLODIPINE BESYLATE 2.5 MG/1
5 TABLET ORAL DAILY
Qty: 0 | Refills: 0 | Status: DISCONTINUED | OUTPATIENT
Start: 2018-12-22 | End: 2018-12-27

## 2018-12-22 RX ORDER — OXYBUTYNIN CHLORIDE 5 MG
5 TABLET ORAL DAILY
Qty: 0 | Refills: 0 | Status: DISCONTINUED | OUTPATIENT
Start: 2018-12-22 | End: 2018-12-27

## 2018-12-22 RX ORDER — DULOXETINE HYDROCHLORIDE 30 MG/1
20 CAPSULE, DELAYED RELEASE ORAL DAILY
Qty: 0 | Refills: 0 | Status: DISCONTINUED | OUTPATIENT
Start: 2018-12-22 | End: 2018-12-27

## 2018-12-22 RX ORDER — QUETIAPINE FUMARATE 200 MG/1
25 TABLET, FILM COATED ORAL AT BEDTIME
Qty: 0 | Refills: 0 | Status: DISCONTINUED | OUTPATIENT
Start: 2018-12-22 | End: 2018-12-27

## 2018-12-22 RX ORDER — PANTOPRAZOLE SODIUM 20 MG/1
40 TABLET, DELAYED RELEASE ORAL
Qty: 0 | Refills: 0 | Status: DISCONTINUED | OUTPATIENT
Start: 2018-12-22 | End: 2018-12-27

## 2018-12-22 RX ORDER — MESALAMINE 400 MG
400 TABLET, DELAYED RELEASE (ENTERIC COATED) ORAL THREE TIMES A DAY
Qty: 0 | Refills: 0 | Status: DISCONTINUED | OUTPATIENT
Start: 2018-12-22 | End: 2018-12-27

## 2018-12-22 RX ORDER — ENOXAPARIN SODIUM 100 MG/ML
40 INJECTION SUBCUTANEOUS DAILY
Qty: 0 | Refills: 0 | Status: DISCONTINUED | OUTPATIENT
Start: 2018-12-22 | End: 2018-12-27

## 2018-12-22 RX ORDER — MECLIZINE HCL 12.5 MG
12.5 TABLET ORAL ONCE
Qty: 0 | Refills: 0 | Status: COMPLETED | OUTPATIENT
Start: 2018-12-22 | End: 2018-12-22

## 2018-12-22 RX ORDER — MECLIZINE HCL 12.5 MG
25 TABLET ORAL EVERY 8 HOURS
Qty: 0 | Refills: 0 | Status: DISCONTINUED | OUTPATIENT
Start: 2018-12-22 | End: 2018-12-27

## 2018-12-22 RX ORDER — OXYBUTYNIN CHLORIDE 5 MG
1 TABLET ORAL
Qty: 0 | Refills: 0 | COMMUNITY

## 2018-12-22 RX ADMIN — ENOXAPARIN SODIUM 40 MILLIGRAM(S): 100 INJECTION SUBCUTANEOUS at 22:52

## 2018-12-22 RX ADMIN — Medication 5 MILLIGRAM(S): at 22:24

## 2018-12-22 RX ADMIN — DULOXETINE HYDROCHLORIDE 20 MILLIGRAM(S): 30 CAPSULE, DELAYED RELEASE ORAL at 22:24

## 2018-12-22 RX ADMIN — QUETIAPINE FUMARATE 25 MILLIGRAM(S): 200 TABLET, FILM COATED ORAL at 22:46

## 2018-12-22 RX ADMIN — Medication 25 MILLIGRAM(S): at 22:47

## 2018-12-22 RX ADMIN — AMLODIPINE BESYLATE 5 MILLIGRAM(S): 2.5 TABLET ORAL at 18:15

## 2018-12-22 RX ADMIN — Medication 12.5 MILLIGRAM(S): at 06:01

## 2018-12-22 NOTE — H&P ADULT - HISTORY OF PRESENT ILLNESS
83y Female PMH Rheumatoid Arthritis, ,Crohn's Disease, Urinary Incontinence, HTN, Vertigo, c/o dizziness limiting ability to ambulate.  Describes symptoms as chronic, unchanged.  Typically ambulates with walker and assistance of , who is currently admitted in ICU.  Evaluated by SW as pt unable to care for self, unsafe for d/c home.  At present, her dizziness has resolved with meclizine.  No additional complaints.     FAMILY HISTORY: reviewed and negative.  SOCIAL HISTORY: - alcohol, - IVDA, - smoking.  REVIEW OF SYSTEMS: General: - fatigue, - weight loss, - fevers, - chills.  HEENT: - headache, - hearing disturbances.  EYES: - visual disturbances, - diplopia.  CARDIOVASCULAR: - chest pain, - palpitations.  PULMONARY: - SOB, - cough, - hemoptysis.  GI: - abdominal pain, - nausea, - vomiting, - diarrhea, - constipation.  : - urinary urgency, - frequency, - dysuria.  MUSCULOSKELETAL: - arthralgias, - myalgias.  NEURO:  - weakness, - numbness.  PSYCH: - depression, - anxiety, - suicidal thoughts. SKIN: - rashes, - lesions.  All remaining ROS are negative.Allergies    sulfa drugs (Anaphylaxis)    Intolerances

## 2018-12-22 NOTE — H&P ADULT - ASSESSMENT
> Vertigo - chronic, unchanged.  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  Responded to Meclizine.  Continue TID. OOB with PT.  Likely JULIAN placement, SW aware.  > Essential HTN - Monitor BP.  Continue oral antihypertensives.  > Depression - mood stable, Continue Duloxetine, Quetiapine.  > Crohn's Disease - symptoms controlled - continue Mesalamine.  > Rheumatoid Arthritis - stable for outpatient followup.    > DVT Prophylaxis - Lovenox subcut

## 2018-12-22 NOTE — ED ADULT NURSE REASSESSMENT NOTE - NS ED NURSE REASSESS COMMENT FT1
Late Note  Report received at change of shift from outgoing RN Yosef Medina and assumed care of pt at that time. Pt received asleep on stretcher and was easily arousable to voice. Pt reports her stepson is on his way to pick her up and take her home. Pt reports he  is pt upstairs and he called his son to get her. Son is here now and requests she be placed in [facility] as no one is home to help her. Dr Palacios was made aware. WIll continue to monitor.
Pt unable to ambulate safely.  Pt to see SW in am.

## 2018-12-22 NOTE — CHART NOTE - NSCHARTNOTEFT_GEN_A_CORE
SW Note: Spoke to pt at bedside, pt states her  is admitted to the hospital and he is her primary caretaker. Pt states she only has her step son that lives on [address] and he is unable to have her stay with him or to help her. SW spoke to pt's daughter Marcela Cabrera [phone] and states pt is in need of a long term facility. Daughter states she was being cared for by her  but reports that he was having a hard time caring for her as she is primarily bed/wheelchair bound and does not ambulate. Pt states she would like to go to [facility] again if she needs Elena as she has been there several times in the past year. MD aware. SW to follow for d/c needs.

## 2018-12-22 NOTE — H&P ADULT - NSHPLABSRESULTS_GEN_ALL_CORE
VITALS:  Vital Signs Last 24 Hrs  T(C): 36.8 (22 Dec 2018 15:25), Max: 37.2 (22 Dec 2018 14:11)  T(F): 98.3 (22 Dec 2018 15:25), Max: 98.9 (22 Dec 2018 14:11)  HR: 83 (22 Dec 2018 15:25) (67 - 83)  BP: 141/84 (22 Dec 2018 15:25) (141/84 - 170/85)  BP(mean): --  RR: 17 (22 Dec 2018 15:25) (15 - 17)  SpO2: 98% (22 Dec 2018 15:25) (96% - 98%) Daily     Daily   CAPILLARY BLOOD GLUCOSE        I&O's Summary      LABS:                        13.3   10.9  )-----------( 248      ( 21 Dec 2018 22:44 )             42.8     12-21    142  |  105  |  15.0  ----------------------------<  90  4.0   |  26.0  |  0.63    Ca    8.9      21 Dec 2018 22:44    TPro  6.9  /  Alb  3.5  /  TBili  0.3<L>  /  DBili  x   /  AST  20  /  ALT  10  /  AlkPhos  74  12-21      LIVER FUNCTIONS - ( 21 Dec 2018 22:44 )  Alb: 3.5 g/dL / Pro: 6.9 g/dL / ALK PHOS: 74 U/L / ALT: 10 U/L / AST: 20 U/L / GGT: x                   MEDICATIONS:  amLODIPine   Tablet 5 milliGRAM(s) Oral daily  DULoxetine 20 milliGRAM(s) Oral daily  meclizine 25 milliGRAM(s) Oral every 8 hours  mesalamine DR Capsule 400 milliGRAM(s) Oral three times a day  oxybutynin 5 milliGRAM(s) Oral daily  pantoprazole    Tablet 40 milliGRAM(s) Oral before breakfast  QUEtiapine 25 milliGRAM(s) Oral at bedtime

## 2018-12-22 NOTE — H&P ADULT - PMH
Cardiac disease  pacemaker 2007  Crohn disease    Depression    High cholesterol    Incontinence of urine in female    Pacemaker, artificial  For the last 10 years, was changed on April 2018  [facility]  Rheumatoid arthritis

## 2018-12-23 LAB
ANION GAP SERPL CALC-SCNC: 13 MMOL/L — SIGNIFICANT CHANGE UP (ref 5–17)
BUN SERPL-MCNC: 13 MG/DL — SIGNIFICANT CHANGE UP (ref 8–20)
CALCIUM SERPL-MCNC: 8.9 MG/DL — SIGNIFICANT CHANGE UP (ref 8.6–10.2)
CHLORIDE SERPL-SCNC: 106 MMOL/L — SIGNIFICANT CHANGE UP (ref 98–107)
CO2 SERPL-SCNC: 24 MMOL/L — SIGNIFICANT CHANGE UP (ref 22–29)
CREAT SERPL-MCNC: 0.69 MG/DL — SIGNIFICANT CHANGE UP (ref 0.5–1.3)
GLUCOSE SERPL-MCNC: 83 MG/DL — SIGNIFICANT CHANGE UP (ref 70–115)
HCT VFR BLD CALC: 44.8 % — SIGNIFICANT CHANGE UP (ref 37–47)
HGB BLD-MCNC: 14.3 G/DL — SIGNIFICANT CHANGE UP (ref 12–16)
MCHC RBC-ENTMCNC: 29.7 PG — SIGNIFICANT CHANGE UP (ref 27–31)
MCHC RBC-ENTMCNC: 31.9 G/DL — LOW (ref 32–36)
MCV RBC AUTO: 92.9 FL — SIGNIFICANT CHANGE UP (ref 81–99)
PLATELET # BLD AUTO: 235 K/UL — SIGNIFICANT CHANGE UP (ref 150–400)
POTASSIUM SERPL-MCNC: 4.1 MMOL/L — SIGNIFICANT CHANGE UP (ref 3.5–5.3)
POTASSIUM SERPL-SCNC: 4.1 MMOL/L — SIGNIFICANT CHANGE UP (ref 3.5–5.3)
RBC # BLD: 4.82 M/UL — SIGNIFICANT CHANGE UP (ref 4.4–5.2)
RBC # FLD: 16.1 % — HIGH (ref 11–15.6)
SODIUM SERPL-SCNC: 143 MMOL/L — SIGNIFICANT CHANGE UP (ref 135–145)
WBC # BLD: 9 K/UL — SIGNIFICANT CHANGE UP (ref 4.8–10.8)
WBC # FLD AUTO: 9 K/UL — SIGNIFICANT CHANGE UP (ref 4.8–10.8)

## 2018-12-23 PROCEDURE — 99232 SBSQ HOSP IP/OBS MODERATE 35: CPT

## 2018-12-23 RX ADMIN — Medication 400 MILLIGRAM(S): at 11:43

## 2018-12-23 RX ADMIN — Medication 25 MILLIGRAM(S): at 14:51

## 2018-12-23 RX ADMIN — Medication 5 MILLIGRAM(S): at 11:44

## 2018-12-23 RX ADMIN — DULOXETINE HYDROCHLORIDE 20 MILLIGRAM(S): 30 CAPSULE, DELAYED RELEASE ORAL at 11:45

## 2018-12-23 RX ADMIN — Medication 400 MILLIGRAM(S): at 08:47

## 2018-12-23 RX ADMIN — ENOXAPARIN SODIUM 40 MILLIGRAM(S): 100 INJECTION SUBCUTANEOUS at 11:43

## 2018-12-23 RX ADMIN — PANTOPRAZOLE SODIUM 40 MILLIGRAM(S): 20 TABLET, DELAYED RELEASE ORAL at 06:32

## 2018-12-23 RX ADMIN — Medication 400 MILLIGRAM(S): at 18:08

## 2018-12-23 RX ADMIN — QUETIAPINE FUMARATE 25 MILLIGRAM(S): 200 TABLET, FILM COATED ORAL at 21:27

## 2018-12-23 RX ADMIN — Medication 25 MILLIGRAM(S): at 21:27

## 2018-12-23 RX ADMIN — AMLODIPINE BESYLATE 5 MILLIGRAM(S): 2.5 TABLET ORAL at 06:34

## 2018-12-23 RX ADMIN — Medication 25 MILLIGRAM(S): at 06:32

## 2018-12-23 NOTE — PROGRESS NOTE ADULT - SUBJECTIVE AND OBJECTIVE BOX
Patient: KARSTEN HALE [id number] 83y Female                           Internal Medicine Hospitalist Progress Note    Initial HPI:  83y Female PMH Rheumatoid Arthritis, ,Crohn's Disease, Urinary Incontinence, HTN, Vertigo, c/o dizziness limiting ability to ambulate.  Describes symptoms as chronic, unchanged.  Typically ambulates with walker and assistance of , who is currently admitted in ICU.  Evaluated by SW as pt unable to care for self, unsafe for d/c home.      Interval History:  Seen today for followup.  Denies pain.  No weakness / numbness.  Dizziness controlled.  No additional complaints.    ____________________PHYSICAL EXAM:  Vitals reviewed as indicated below  GENERAL:  NAD Alert and Oriented x 3   HEENT: NCAT  CARDIOVASCULAR:  S1, S2  LUNGS: CTAB  ABDOMEN:  soft, (-) tenderness, (-) distension, (+) bowel sounds, (-) guarding, (-) rebound (-) rigidity  EXTREMITIES:  no cyanosis / clubbing / edema.   ____________________      BACKGROUND:  HEALTH ISSUES - PROBLEM Dx:        Allergies    sulfa drugs (Anaphylaxis)    Intolerances      PAST MEDICAL & SURGICAL HISTORY:  Pacemaker, artificial: For the last 10 years, was changed on April 2018  [facility]  Incontinence of urine in female  Rheumatoid arthritis  Depression  Cardiac disease: pacemaker 2007  High cholesterol  Crohn disease  Pacemaker        VITALS:  Vital Signs Last 24 Hrs  T(C): 36.7 (23 Dec 2018 08:22), Max: 37.2 (22 Dec 2018 14:11)  T(F): 98 (23 Dec 2018 08:22), Max: 98.9 (22 Dec 2018 14:11)  HR: 81 (23 Dec 2018 08:22) (67 - 83)  BP: 136/83 (23 Dec 2018 08:22) (125/69 - 170/85)  BP(mean): --  RR: 18 (23 Dec 2018 08:22) (15 - 19)  SpO2: 97% (23 Dec 2018 08:22) (96% - 99%) Daily     Daily   CAPILLARY BLOOD GLUCOSE        I&O's Summary        LABS:                        14.3   9.0   )-----------( 235      ( 23 Dec 2018 08:16 )             44.8     12-23    143  |  106  |  13.0  ----------------------------<  83  4.1   |  24.0  |  0.69    Ca    8.9      23 Dec 2018 08:16    TPro  6.9  /  Alb  3.5  /  TBili  0.3<L>  /  DBili  x   /  AST  20  /  ALT  10  /  AlkPhos  74  12-21      LIVER FUNCTIONS - ( 21 Dec 2018 22:44 )  Alb: 3.5 g/dL / Pro: 6.9 g/dL / ALK PHOS: 74 U/L / ALT: 10 U/L / AST: 20 U/L / GGT: x                     MEDICATIONS:  MEDICATIONS  (STANDING):  amLODIPine   Tablet 5 milliGRAM(s) Oral daily  DULoxetine 20 milliGRAM(s) Oral daily  enoxaparin Injectable 40 milliGRAM(s) SubCutaneous daily  meclizine 25 milliGRAM(s) Oral every 8 hours  mesalamine DR Capsule 400 milliGRAM(s) Oral three times a day  oxybutynin 5 milliGRAM(s) Oral daily  pantoprazole    Tablet 40 milliGRAM(s) Oral before breakfast  predniSONE   Tablet 5 milliGRAM(s) Oral two times a day  QUEtiapine 25 milliGRAM(s) Oral at bedtime    MEDICATIONS  (PRN):

## 2018-12-23 NOTE — PHYSICAL THERAPY INITIAL EVALUATION ADULT - ADDITIONAL COMMENTS
Pt. lives with her  in a house with 2 GREG through garage and grab bars to hold. pt. stays on one level. pt. reports she has a RW but rarely uses it because she is ambulatory short distances and the RW does not fit through home. Pt. performs stairs with assistance of  and amb hand held assist with  bed to w/c or hsort household distances about 20 ft. Pt.'s  is currently a patient at [facility] in the ICU and is unable to care for/assist her. Pt. owns a raised toilet seat, RW, W/C, and shower seat. Pt. does not have family that can assist her.

## 2018-12-23 NOTE — PROGRESS NOTE ADULT - ASSESSMENT
> Vertigo - chronic, unchanged.  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  Responded to Meclizine.  Continue TID. OOB with PT.  Likely JULIAN placement, SW aware.  > Essential HTN - Monitor BP.  Continue oral antihypertensives.  > Depression - mood stable, Continue Duloxetine, Quetiapine.  > Crohn's Disease - symptoms controlled - continue Mesalamine.  > Rheumatoid Arthritis - on Prednisone 5mg BID.    > DVT Prophylaxis - Lovenox subcut    Probable d/c to JULIAN in am.

## 2018-12-24 LAB
APPEARANCE UR: CLEAR — SIGNIFICANT CHANGE UP
BACTERIA # UR AUTO: ABNORMAL
BILIRUB UR-MCNC: NEGATIVE — SIGNIFICANT CHANGE UP
COLOR SPEC: YELLOW — SIGNIFICANT CHANGE UP
DIFF PNL FLD: ABNORMAL
EPI CELLS # UR: SIGNIFICANT CHANGE UP
GLUCOSE UR QL: NEGATIVE MG/DL — SIGNIFICANT CHANGE UP
HCT VFR BLD CALC: 44.4 % — SIGNIFICANT CHANGE UP (ref 37–47)
HGB BLD-MCNC: 14.2 G/DL — SIGNIFICANT CHANGE UP (ref 12–16)
KETONES UR-MCNC: NEGATIVE — SIGNIFICANT CHANGE UP
LEUKOCYTE ESTERASE UR-ACNC: NEGATIVE — SIGNIFICANT CHANGE UP
MCHC RBC-ENTMCNC: 29.8 PG — SIGNIFICANT CHANGE UP (ref 27–31)
MCHC RBC-ENTMCNC: 32 G/DL — SIGNIFICANT CHANGE UP (ref 32–36)
MCV RBC AUTO: 93.3 FL — SIGNIFICANT CHANGE UP (ref 81–99)
NITRITE UR-MCNC: NEGATIVE — SIGNIFICANT CHANGE UP
PH UR: 6 — SIGNIFICANT CHANGE UP (ref 5–8)
PLATELET # BLD AUTO: 224 K/UL — SIGNIFICANT CHANGE UP (ref 150–400)
PROT UR-MCNC: NEGATIVE MG/DL — SIGNIFICANT CHANGE UP
RBC # BLD: 4.76 M/UL — SIGNIFICANT CHANGE UP (ref 4.4–5.2)
RBC # FLD: 15.8 % — HIGH (ref 11–15.6)
RBC CASTS # UR COMP ASSIST: ABNORMAL /HPF (ref 0–4)
SP GR SPEC: 1.01 — SIGNIFICANT CHANGE UP (ref 1.01–1.02)
UROBILINOGEN FLD QL: NEGATIVE MG/DL — SIGNIFICANT CHANGE UP
WBC # BLD: 5.1 K/UL — SIGNIFICANT CHANGE UP (ref 4.8–10.8)
WBC # FLD AUTO: 5.1 K/UL — SIGNIFICANT CHANGE UP (ref 4.8–10.8)
WBC UR QL: SIGNIFICANT CHANGE UP

## 2018-12-24 PROCEDURE — 71045 X-RAY EXAM CHEST 1 VIEW: CPT | Mod: 26

## 2018-12-24 PROCEDURE — 99232 SBSQ HOSP IP/OBS MODERATE 35: CPT

## 2018-12-24 RX ORDER — SODIUM CHLORIDE 9 MG/ML
1000 INJECTION INTRAMUSCULAR; INTRAVENOUS; SUBCUTANEOUS
Qty: 0 | Refills: 0 | Status: DISCONTINUED | OUTPATIENT
Start: 2018-12-24 | End: 2018-12-26

## 2018-12-24 RX ORDER — ACETAMINOPHEN 500 MG
650 TABLET ORAL EVERY 6 HOURS
Qty: 0 | Refills: 0 | Status: DISCONTINUED | OUTPATIENT
Start: 2018-12-24 | End: 2018-12-27

## 2018-12-24 RX ADMIN — SODIUM CHLORIDE 75 MILLILITER(S): 9 INJECTION INTRAMUSCULAR; INTRAVENOUS; SUBCUTANEOUS at 17:21

## 2018-12-24 RX ADMIN — Medication 400 MILLIGRAM(S): at 21:10

## 2018-12-24 RX ADMIN — Medication 100 MILLIGRAM(S): at 21:09

## 2018-12-24 RX ADMIN — QUETIAPINE FUMARATE 25 MILLIGRAM(S): 200 TABLET, FILM COATED ORAL at 21:09

## 2018-12-24 RX ADMIN — Medication 5 MILLIGRAM(S): at 11:22

## 2018-12-24 RX ADMIN — Medication 25 MILLIGRAM(S): at 17:19

## 2018-12-24 RX ADMIN — AMLODIPINE BESYLATE 5 MILLIGRAM(S): 2.5 TABLET ORAL at 05:22

## 2018-12-24 RX ADMIN — Medication 25 MILLIGRAM(S): at 05:22

## 2018-12-24 RX ADMIN — PANTOPRAZOLE SODIUM 40 MILLIGRAM(S): 20 TABLET, DELAYED RELEASE ORAL at 05:23

## 2018-12-24 RX ADMIN — Medication 650 MILLIGRAM(S): at 11:15

## 2018-12-24 RX ADMIN — Medication 650 MILLIGRAM(S): at 10:30

## 2018-12-24 RX ADMIN — Medication 5 MILLIGRAM(S): at 07:36

## 2018-12-24 RX ADMIN — Medication 400 MILLIGRAM(S): at 17:20

## 2018-12-24 RX ADMIN — Medication 5 MILLIGRAM(S): at 17:19

## 2018-12-24 RX ADMIN — Medication 25 MILLIGRAM(S): at 21:10

## 2018-12-24 RX ADMIN — DULOXETINE HYDROCHLORIDE 20 MILLIGRAM(S): 30 CAPSULE, DELAYED RELEASE ORAL at 11:23

## 2018-12-24 RX ADMIN — Medication 400 MILLIGRAM(S): at 05:22

## 2018-12-24 RX ADMIN — SODIUM CHLORIDE 75 MILLILITER(S): 9 INJECTION INTRAMUSCULAR; INTRAVENOUS; SUBCUTANEOUS at 09:52

## 2018-12-24 RX ADMIN — ENOXAPARIN SODIUM 40 MILLIGRAM(S): 100 INJECTION SUBCUTANEOUS at 11:23

## 2018-12-24 NOTE — PROGRESS NOTE ADULT - ASSESSMENT
> Fever - unclear source of infection. ? pneumonia.  cultures, xray, urinalysis ordered.  Repeat CBC requested.  If fever recurs, will consider initiating antibiotic therapy.   > Vertigo - chronic, unchanged.  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  Responded to Meclizine.  Continue TID. OOB with PT.  Likely JULIAN placement, SW aware.  > Essential HTN - Monitor BP.  Continue oral antihypertensives.  > Depression - mood stable, Continue Duloxetine, Quetiapine.  > Crohn's Disease - symptoms controlled - continue Mesalamine.  > Rheumatoid Arthritis - on Prednisone 5mg BID.    > DVT Prophylaxis - Lovenox subcut

## 2018-12-24 NOTE — PROGRESS NOTE ADULT - SUBJECTIVE AND OBJECTIVE BOX
Patient: KARSTEN HALE [id number] 83y Female                           Internal Medicine Hospitalist Progress Note    Initial HPI:  83y Female PMH Rheumatoid Arthritis, ,Crohn's Disease, Urinary Incontinence, HTN, Vertigo, c/o dizziness limiting ability to ambulate.  Describes symptoms as chronic, unchanged.  Typically ambulates with walker and assistance of , who is currently admitted in ICU.  Evaluated by SW as pt unable to care for self, unsafe for d/c home.      Interval History:  Fever noted T 101.  + cough, nonproductive.  No chills.  No Abdominal pain, nausea, vomiting, diarrhea, nor constipation. No urinary complaints.      ____________________PHYSICAL EXAM:  Vitals reviewed as indicated below  GENERAL:  NAD Alert and Oriented x 3   HEENT: NCAT  CARDIOVASCULAR:  S1, S2  LUNGS: coarse bibasilar BS.   ABDOMEN:  soft, (-) tenderness, (-) distension, (+) bowel sounds, (-) guarding, (-) rebound (-) rigidity  EXTREMITIES:  no cyanosis / clubbing / edema.   ____________________    VITALS:  Vital Signs Last 24 Hrs  T(C): 38.3 (24 Dec 2018 09:24), Max: 38.3 (24 Dec 2018 09:24)  T(F): 101 (24 Dec 2018 09:24), Max: 101 (24 Dec 2018 09:24)  HR: 94 (24 Dec 2018 09:24) (73 - 94)  BP: 120/74 (24 Dec 2018 09:24) (117/70 - 134/78)  BP(mean): --  RR: 16 (24 Dec 2018 09:24) (16 - 19)  SpO2: 93% (24 Dec 2018 09:24) (93% - 98%) Daily     Daily   CAPILLARY BLOOD GLUCOSE        I&O's Summary      LABS:                        14.3   9.0   )-----------( 235      ( 23 Dec 2018 08:16 )             44.8     12-23    143  |  106  |  13.0  ----------------------------<  83  4.1   |  24.0  |  0.69    Ca    8.9      23 Dec 2018 08:16                  MEDICATIONS:  acetaminophen   Tablet .. 650 milliGRAM(s) Oral every 6 hours PRN  amLODIPine   Tablet 5 milliGRAM(s) Oral daily  DULoxetine 20 milliGRAM(s) Oral daily  enoxaparin Injectable 40 milliGRAM(s) SubCutaneous daily  meclizine 25 milliGRAM(s) Oral every 8 hours  mesalamine DR Capsule 400 milliGRAM(s) Oral three times a day  oxybutynin 5 milliGRAM(s) Oral daily  pantoprazole    Tablet 40 milliGRAM(s) Oral before breakfast  predniSONE   Tablet 5 milliGRAM(s) Oral two times a day  QUEtiapine 25 milliGRAM(s) Oral at bedtime  sodium chloride 0.9%. 1000 milliLiter(s) IV Continuous <Continuous>

## 2018-12-25 LAB
CULTURE RESULTS: SIGNIFICANT CHANGE UP
SPECIMEN SOURCE: SIGNIFICANT CHANGE UP

## 2018-12-25 PROCEDURE — 99232 SBSQ HOSP IP/OBS MODERATE 35: CPT

## 2018-12-25 RX ADMIN — AMLODIPINE BESYLATE 5 MILLIGRAM(S): 2.5 TABLET ORAL at 05:03

## 2018-12-25 RX ADMIN — ENOXAPARIN SODIUM 40 MILLIGRAM(S): 100 INJECTION SUBCUTANEOUS at 12:23

## 2018-12-25 RX ADMIN — Medication 25 MILLIGRAM(S): at 22:26

## 2018-12-25 RX ADMIN — Medication 25 MILLIGRAM(S): at 05:03

## 2018-12-25 RX ADMIN — Medication 5 MILLIGRAM(S): at 05:03

## 2018-12-25 RX ADMIN — Medication 100 MILLIGRAM(S): at 12:23

## 2018-12-25 RX ADMIN — Medication 5 MILLIGRAM(S): at 12:23

## 2018-12-25 RX ADMIN — Medication 400 MILLIGRAM(S): at 05:03

## 2018-12-25 RX ADMIN — Medication 400 MILLIGRAM(S): at 12:22

## 2018-12-25 RX ADMIN — PANTOPRAZOLE SODIUM 40 MILLIGRAM(S): 20 TABLET, DELAYED RELEASE ORAL at 05:03

## 2018-12-25 RX ADMIN — SODIUM CHLORIDE 75 MILLILITER(S): 9 INJECTION INTRAMUSCULAR; INTRAVENOUS; SUBCUTANEOUS at 22:26

## 2018-12-25 RX ADMIN — SODIUM CHLORIDE 75 MILLILITER(S): 9 INJECTION INTRAMUSCULAR; INTRAVENOUS; SUBCUTANEOUS at 12:23

## 2018-12-25 RX ADMIN — Medication 400 MILLIGRAM(S): at 22:26

## 2018-12-25 RX ADMIN — DULOXETINE HYDROCHLORIDE 20 MILLIGRAM(S): 30 CAPSULE, DELAYED RELEASE ORAL at 12:23

## 2018-12-25 RX ADMIN — Medication 5 MILLIGRAM(S): at 16:57

## 2018-12-25 RX ADMIN — QUETIAPINE FUMARATE 25 MILLIGRAM(S): 200 TABLET, FILM COATED ORAL at 22:26

## 2018-12-25 RX ADMIN — Medication 25 MILLIGRAM(S): at 12:23

## 2018-12-25 NOTE — PROGRESS NOTE ADULT - ASSESSMENT
> Fever - No recurrence.  No clear source of infection - CXR, UA unremarkable.  WBC wnl.  Holding antibiotic therapy.  If fever recurs or symptoms worsen, will reconsider abx.   > Vertigo - chronic, unchanged.  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  Responded to Meclizine.  Continue TID. OOB with PT.  Likely JULIAN placement, SW aware.  > Essential HTN - Monitor BP.  Continue oral antihypertensives.  > Depression - mood stable, Continue Duloxetine, Quetiapine.  > Crohn's Disease - symptoms controlled - continue Mesalamine.  > Rheumatoid Arthritis - on Prednisone 5mg BID.    > DVT Prophylaxis - Lovenox subcut

## 2018-12-25 NOTE — PROGRESS NOTE ADULT - SUBJECTIVE AND OBJECTIVE BOX
Patient: KARSTEN HALE [id number] 83y Female                           Internal Medicine Hospitalist Progress Note    Initial HPI:  83y Female PMH Rheumatoid Arthritis, ,Crohn's Disease, Urinary Incontinence, HTN, Vertigo, c/o dizziness limiting ability to ambulate.  Describes symptoms as chronic, unchanged.  Typically ambulates with walker and assistance of , who is currently admitted in ICU.  Evaluated by SW as pt unable to care for self, unsafe for d/c home.  Fever noted on  T 101.      Interval History:  Pt has remained afebrile.  Still with nonproductive cough.  no fever / chills.  No Abdominal pain, nausea, vomiting, diarrhea, nor constipation.  No urinary complaints.     ____________________PHYSICAL EXAM:  Vitals reviewed as indicated below  GENERAL:  NAD Alert and Oriented x 3   HEENT: NCAT  CARDIOVASCULAR:  S1, S2  LUNGS: coarse bibasilar BS.   ABDOMEN:  soft, (-) tenderness, (-) distension, (+) bowel sounds, (-) guarding, (-) rebound (-) rigidity  EXTREMITIES:  no cyanosis / clubbing / edema.   ____________________    VITALS:  Vital Signs Last 24 Hrs  T(C): 37.3 (25 Dec 2018 07:24), Max: 37.3 (25 Dec 2018 07:24)  T(F): 99.1 (25 Dec 2018 07:24), Max: 99.1 (25 Dec 2018 07:24)  HR: 73 (25 Dec 2018 07:24) (73 - 85)  BP: 126/56 (25 Dec 2018 07:24) (118/- - 153/80)  BP(mean): 78 (24 Dec 2018 17:49) (78 - 78)  RR: 17 (25 Dec 2018 07:24) (17 - 18)  SpO2: 99% (24 Dec 2018 23:52) (98% - 99%) Daily     Daily   CAPILLARY BLOOD GLUCOSE        I&O's Summary    24 Dec 2018 07:01  -  25 Dec 2018 07:00  --------------------------------------------------------  IN: 1575 mL / OUT: 0 mL / NET: 1575 mL        LABS:                        14.2   5.1   )-----------( 224      ( 24 Dec 2018 11:51 )             44.4               Urinalysis Basic - ( 24 Dec 2018 15:55 )    Color: Yellow / Appearance: Clear / S.010 / pH: x  Gluc: x / Ketone: Negative  / Bili: Negative / Urobili: Negative mg/dL   Blood: x / Protein: Negative mg/dL / Nitrite: Negative   Leuk Esterase: Negative / RBC: 6-10 /HPF / WBC 0-2   Sq Epi: x / Non Sq Epi: Few / Bacteria: Occasional            MEDICATIONS:  acetaminophen   Tablet .. 650 milliGRAM(s) Oral every 6 hours PRN  amLODIPine   Tablet 5 milliGRAM(s) Oral daily  benzonatate 100 milliGRAM(s) Oral three times a day PRN  DULoxetine 20 milliGRAM(s) Oral daily  enoxaparin Injectable 40 milliGRAM(s) SubCutaneous daily  meclizine 25 milliGRAM(s) Oral every 8 hours  mesalamine DR Capsule 400 milliGRAM(s) Oral three times a day  oxybutynin 5 milliGRAM(s) Oral daily  pantoprazole    Tablet 40 milliGRAM(s) Oral before breakfast  predniSONE   Tablet 5 milliGRAM(s) Oral two times a day  QUEtiapine 25 milliGRAM(s) Oral at bedtime  sodium chloride 0.9%. 1000 milliLiter(s) IV Continuous <Continuous>

## 2018-12-26 ENCOUNTER — TRANSCRIPTION ENCOUNTER (OUTPATIENT)
Age: 83
End: 2018-12-26

## 2018-12-26 LAB
ANION GAP SERPL CALC-SCNC: 12 MMOL/L — SIGNIFICANT CHANGE UP (ref 5–17)
BUN SERPL-MCNC: 16 MG/DL — SIGNIFICANT CHANGE UP (ref 8–20)
CALCIUM SERPL-MCNC: 8.7 MG/DL — SIGNIFICANT CHANGE UP (ref 8.6–10.2)
CHLORIDE SERPL-SCNC: 103 MMOL/L — SIGNIFICANT CHANGE UP (ref 98–107)
CO2 SERPL-SCNC: 27 MMOL/L — SIGNIFICANT CHANGE UP (ref 22–29)
CREAT SERPL-MCNC: 0.71 MG/DL — SIGNIFICANT CHANGE UP (ref 0.5–1.3)
GLUCOSE SERPL-MCNC: 148 MG/DL — HIGH (ref 70–115)
HCT VFR BLD CALC: 42.7 % — SIGNIFICANT CHANGE UP (ref 37–47)
HGB BLD-MCNC: 13.4 G/DL — SIGNIFICANT CHANGE UP (ref 12–16)
MCHC RBC-ENTMCNC: 29.6 PG — SIGNIFICANT CHANGE UP (ref 27–31)
MCHC RBC-ENTMCNC: 31.4 G/DL — LOW (ref 32–36)
MCV RBC AUTO: 94.3 FL — SIGNIFICANT CHANGE UP (ref 81–99)
PLATELET # BLD AUTO: 218 K/UL — SIGNIFICANT CHANGE UP (ref 150–400)
POTASSIUM SERPL-MCNC: 4.2 MMOL/L — SIGNIFICANT CHANGE UP (ref 3.5–5.3)
POTASSIUM SERPL-SCNC: 4.2 MMOL/L — SIGNIFICANT CHANGE UP (ref 3.5–5.3)
RBC # BLD: 4.53 M/UL — SIGNIFICANT CHANGE UP (ref 4.4–5.2)
RBC # FLD: 16.1 % — HIGH (ref 11–15.6)
SODIUM SERPL-SCNC: 142 MMOL/L — SIGNIFICANT CHANGE UP (ref 135–145)
WBC # BLD: 6.1 K/UL — SIGNIFICANT CHANGE UP (ref 4.8–10.8)
WBC # FLD AUTO: 6.1 K/UL — SIGNIFICANT CHANGE UP (ref 4.8–10.8)

## 2018-12-26 PROCEDURE — 99232 SBSQ HOSP IP/OBS MODERATE 35: CPT

## 2018-12-26 RX ORDER — FESOTERODINE FUMARATE 8 MG/1
1 TABLET, FILM COATED, EXTENDED RELEASE ORAL
Qty: 0 | Refills: 0 | COMMUNITY

## 2018-12-26 RX ORDER — MECLIZINE HCL 12.5 MG
1 TABLET ORAL
Qty: 0 | Refills: 0 | DISCHARGE
Start: 2018-12-26

## 2018-12-26 RX ORDER — MESALAMINE 400 MG
1 TABLET, DELAYED RELEASE (ENTERIC COATED) ORAL
Qty: 0 | Refills: 0 | DISCHARGE
Start: 2018-12-26

## 2018-12-26 RX ADMIN — Medication 5 MILLIGRAM(S): at 11:24

## 2018-12-26 RX ADMIN — Medication 25 MILLIGRAM(S): at 06:24

## 2018-12-26 RX ADMIN — Medication 400 MILLIGRAM(S): at 23:08

## 2018-12-26 RX ADMIN — ENOXAPARIN SODIUM 40 MILLIGRAM(S): 100 INJECTION SUBCUTANEOUS at 11:24

## 2018-12-26 RX ADMIN — AMLODIPINE BESYLATE 5 MILLIGRAM(S): 2.5 TABLET ORAL at 06:24

## 2018-12-26 RX ADMIN — Medication 400 MILLIGRAM(S): at 06:24

## 2018-12-26 RX ADMIN — QUETIAPINE FUMARATE 25 MILLIGRAM(S): 200 TABLET, FILM COATED ORAL at 23:08

## 2018-12-26 RX ADMIN — Medication 25 MILLIGRAM(S): at 23:08

## 2018-12-26 RX ADMIN — DULOXETINE HYDROCHLORIDE 20 MILLIGRAM(S): 30 CAPSULE, DELAYED RELEASE ORAL at 11:24

## 2018-12-26 RX ADMIN — Medication 400 MILLIGRAM(S): at 06:25

## 2018-12-26 RX ADMIN — Medication 5 MILLIGRAM(S): at 18:42

## 2018-12-26 RX ADMIN — Medication 100 MILLIGRAM(S): at 23:08

## 2018-12-26 RX ADMIN — Medication 5 MILLIGRAM(S): at 06:24

## 2018-12-26 RX ADMIN — Medication 400 MILLIGRAM(S): at 14:55

## 2018-12-26 RX ADMIN — PANTOPRAZOLE SODIUM 40 MILLIGRAM(S): 20 TABLET, DELAYED RELEASE ORAL at 06:24

## 2018-12-26 NOTE — DISCHARGE NOTE ADULT - PLAN OF CARE
stable for discharge It is important to see your primary physician as well as the physicians noted below within the next week to perform a comprehensive medical review.  Call their offices for an appointment as soon as you leave the hospital.  If you do not have a primary physician, contact the [facility] at [place] [phone] located on [address] [place] [address].  Your medical issues appear to be stable at this time, but if your symptoms recur or worsen, contact your physicians and/or return to the hospital if necessary.  If you encounter any issues or questions with your medication, call your physicians before stopping the medication.  Do not drive.  Limit your diet to 2 grams of sodium daily.

## 2018-12-26 NOTE — DISCHARGE NOTE ADULT - PATIENT PORTAL LINK FT
You can access the [facility] Patient Portal, offered by [facility], by registering with the following website: [email or web address]

## 2018-12-26 NOTE — DISCHARGE NOTE ADULT - MEDICATION SUMMARY - MEDICATIONS TO TAKE
I will START or STAY ON the medications listed below when I get home from the hospital:    mesalamine 400 mg oral delayed release capsule  -- 1 cap(s) by mouth 3 times a day  -- Indication: For Crohn's disease    predniSONE 5 mg oral tablet  -- 1 tab(s) by mouth 2 times a day  -- Indication: For Rheumatoid arthritis    DULoxetine 20 mg oral delayed release capsule  -- 1 cap(s) by mouth once a day  -- Indication: For Depression    meclizine 25 mg oral tablet  -- 1 tab(s) by mouth every 8 hours  -- Indication: For vertigo    simvastatin 20 mg oral tablet  -- 1 tab(s) by mouth once a day (at bedtime)  -- Indication: For Hyperlipidemia    QUEtiapine 25 mg oral tablet  -- 1 tab(s) by mouth once a day (at bedtime)  -- Indication: For Depression    amLODIPine 5 mg oral tablet  -- 1 tab(s) by mouth once a day  -- Indication: For Hypertension    pantoprazole 40 mg oral delayed release tablet  -- 1 tab(s) by mouth once a day (before a meal)  -- Indication: For Gastritis    Myrbetriq 25 mg oral tablet, extended release  -- 1 tab(s) by mouth once a day  -- Indication: For Urinary incontinence

## 2018-12-26 NOTE — DISCHARGE NOTE ADULT - SECONDARY DIAGNOSIS.
Rheumatoid arthritis involving multiple sites, unspecified rheumatoid factor presence Crohn's disease with other complication High cholesterol Depression, unspecified depression type

## 2018-12-26 NOTE — DISCHARGE NOTE ADULT - MEDICATION SUMMARY - MEDICATIONS TO STOP TAKING
I will STOP taking the medications listed below when I get home from the hospital:    saccharomyces boulardii lyo 250 mg oral capsule  -- 1 cap(s) by mouth 2 times a day    Levaquin 500 mg oral tablet  -- 1 tab(s) by mouth every 24 hours   -- Avoid prolonged or excessive exposure to direct and/or artificial sunlight while taking this medication.  Do not take dairy products, antacids, or iron preparations within one hour of this medication.  Finish all this medication unless otherwise directed by prescriber.  May cause drowsiness or dizziness.  Medication should be taken with plenty of water.

## 2018-12-26 NOTE — PROGRESS NOTE ADULT - SUBJECTIVE AND OBJECTIVE BOX
Patient: KARSTEN HALE [id number] 83y Female                           Internal Medicine Hospitalist Progress Note    Initial HPI:  83y Female PMH Rheumatoid Arthritis, ,Crohn's Disease, Urinary Incontinence, HTN, Vertigo, c/o dizziness limiting ability to ambulate.  Describes symptoms as chronic, unchanged.  Typically ambulates with walker and assistance of , who is currently admitted in ICU.  Evaluated by SW as pt unable to care for self, unsafe for d/c home.  Fever noted on  T 101.  Urinalysis, CXR unremarkable.  She has had no recurrence of fever.     Interval History:  No complaints.  No fever / chills.  No urinary complaints.  Mild nonproductive cough.    ____________________PHYSICAL EXAM:  Vitals reviewed as indicated below  GENERAL:  NAD Alert and Oriented x 3   HEENT: NCAT  CARDIOVASCULAR:  S1, S2  LUNGS: coarse bibasilar BS.   ABDOMEN:  soft, (-) tenderness, (-) distension, (+) bowel sounds, (-) guarding, (-) rebound (-) rigidity  EXTREMITIES:  no cyanosis / clubbing / edema.   ____________________    VITALS:  Vital Signs Last 24 Hrs  T(C): 36.8 (26 Dec 2018 08:07), Max: 37 (25 Dec 2018 23:44)  T(F): 98.2 (26 Dec 2018 08:07), Max: 98.6 (25 Dec 2018 23:44)  HR: 64 (26 Dec 2018 08:07) (64 - 70)  BP: 120/70 (26 Dec 2018 08:07) (120/70 - 126/72)  BP(mean): --  RR: 18 (26 Dec 2018 08:07) (18 - 18)  SpO2: 98% (26 Dec 2018 08:07) (98% - 99%) Daily     Daily   CAPILLARY BLOOD GLUCOSE        I&O's Summary      LABS:                        13.4   6.1   )-----------( 218      ( 26 Dec 2018 09:26 )             42.7         142  |  103  |  16.0  ----------------------------<  148<H>  4.2   |  27.0  |  0.71    Ca    8.7      26 Dec 2018 09:26          Urinalysis Basic - ( 24 Dec 2018 15:55 )    Color: Yellow / Appearance: Clear / S.010 / pH: x  Gluc: x / Ketone: Negative  / Bili: Negative / Urobili: Negative mg/dL   Blood: x / Protein: Negative mg/dL / Nitrite: Negative   Leuk Esterase: Negative / RBC: 6-10 /HPF / WBC 0-2   Sq Epi: x / Non Sq Epi: Few / Bacteria: Occasional            MEDICATIONS:  acetaminophen   Tablet .. 650 milliGRAM(s) Oral every 6 hours PRN  amLODIPine   Tablet 5 milliGRAM(s) Oral daily  benzonatate 100 milliGRAM(s) Oral three times a day PRN  DULoxetine 20 milliGRAM(s) Oral daily  enoxaparin Injectable 40 milliGRAM(s) SubCutaneous daily  meclizine 25 milliGRAM(s) Oral every 8 hours  mesalamine DR Capsule 400 milliGRAM(s) Oral three times a day  oxybutynin 5 milliGRAM(s) Oral daily  pantoprazole    Tablet 40 milliGRAM(s) Oral before breakfast  predniSONE   Tablet 5 milliGRAM(s) Oral two times a day  QUEtiapine 25 milliGRAM(s) Oral at bedtime

## 2018-12-26 NOTE — PROGRESS NOTE ADULT - ASSESSMENT
> Fever - No recurrence.  No clear source of infection - CXR, UA unremarkable.  WBC wnl. Will f/u blood cultures.  Unless they are positive, no indication of active infection.    > Vertigo - chronic, unchanged.  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  Responded to Meclizine.  Continue TID. OOB with PT.  Likely JULIAN placement, SW aware.  > Essential HTN - Monitor BP.  Continue oral antihypertensives.  > Depression - mood stable, Continue Duloxetine, Quetiapine.  > Crohn's Disease - symptoms controlled - continue Mesalamine.  > Rheumatoid Arthritis - on Prednisone 5mg BID.    > DVT Prophylaxis - Lovenox subcut    Stable for JULIAN.

## 2018-12-26 NOTE — DISCHARGE NOTE ADULT - HOSPITAL COURSE
Patient: KARSTEN HALE [id number]                 Internal Medicine Hospitalist Discharge Note    83y Female PMH Rheumatoid Arthritis, ,Crohn's Disease, Urinary Incontinence, HTN, Vertigo, c/o dizziness limiting ability to ambulate.  Describes symptoms as chronic, unchanged.  Typically ambulates with walker and assistance of , who is currently admitted in ICU.  Evaluated by SW as pt unable to care for self, unsafe for d/c home.  Fever noted on 12/24 T 101.  Urinalysis, CXR unremarkable.  She has had no recurrence of fever.   > Fever - No recurrence.  No clear source of infection - CXR, UA unremarkable.  WBC wnl. Will f/u blood cultures.  Unless they are positive, no indication of active infection.    > Vertigo - chronic, unchanged.  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  Responded to Meclizine.  Continue TID. OOB with PT.  Likely JULIAN placement, SW aware.  > Essential HTN - Monitor BP.  Continue oral antihypertensives.  > Depression - mood stable, Continue Duloxetine, Quetiapine.  > Crohn's Disease - symptoms controlled - continue Mesalamine.  > Rheumatoid Arthritis - on Prednisone 5mg BID.      Disposition: stable for discharge.  Outpatient followup as above.  Patient was advised to return if they experience any recurrence or worsening of symptoms.   -Arjun Garcia D.O., Hospitalist, [facility] Patient: KARSTEN HALE [id number]                 Internal Medicine Hospitalist Discharge Note    Initial HPI:  83y Female PMH Rheumatoid Arthritis, ,Crohn's Disease, Urinary Incontinence, HTN, Vertigo, c/o dizziness limiting ability to ambulate.  Describes symptoms as chronic, unchanged.  Typically ambulates with walker and assistance of , who is currently admitted in ICU.  Evaluated by SW as pt unable to care for self, unsafe for d/c home.  Fever noted on 12/24 T 101.  Urinalysis, CXR unremarkable.  She has had no recurrence of fever.  Remains off Abx.     Interval History:  No complaints.  No fever / chills.  No urinary complaints.  Mild nonproductive cough.    > Fever - No recurrence.  No clear source of infection - CXR, UA unremarkable.  WBC wnl.  Stable for d/c to [facility].   > Vertigo - chronic, unchanged.  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  Responded to Meclizine.  Continue TID. OOB with PT.  JULIAN placement today.   > Essential HTN - Monitor BP.  Continue oral antihypertensives.  > Depression - mood stable, Continue Duloxetine, Quetiapine.  > Crohn's Disease - symptoms controlled - continue Mesalamine.  > Rheumatoid Arthritis - on Prednisone 5mg BID.      Disposition: stable for discharge.  Outpatient followup as above.  Patient was advised to return if they experience any recurrence or worsening of symptoms.   -Arjun Garcia D.O., Hospitalist, [facility]

## 2018-12-26 NOTE — DISCHARGE NOTE ADULT - CARE PLAN
Principal Discharge DX:	Vertigo  Goal:	stable for discharge  Assessment and plan of treatment:	It is important to see your primary physician as well as the physicians noted below within the next week to perform a comprehensive medical review.  Call their offices for an appointment as soon as you leave the hospital.  If you do not have a primary physician, contact the [facility] at [place] [phone] located on [address] [place] [address], [place] [address].  Your medical issues appear to be stable at this time, but if your symptoms recur or worsen, contact your physicians and/or return to the hospital if necessary.  If you encounter any issues or questions with your medication, call your physicians before stopping the medication.  Do not drive.  Limit your diet to 2 grams of sodium daily.  Secondary Diagnosis:	Rheumatoid arthritis involving multiple sites, unspecified rheumatoid factor presence  Secondary Diagnosis:	Crohn's disease with other complication  Secondary Diagnosis:	High cholesterol  Secondary Diagnosis:	Depression, unspecified depression type

## 2018-12-27 VITALS
RESPIRATION RATE: 16 BRPM | OXYGEN SATURATION: 98 % | DIASTOLIC BLOOD PRESSURE: 77 MMHG | SYSTOLIC BLOOD PRESSURE: 135 MMHG | TEMPERATURE: 99 F | HEART RATE: 78 BPM

## 2018-12-27 PROCEDURE — 97163 PT EVAL HIGH COMPLEX 45 MIN: CPT

## 2018-12-27 PROCEDURE — 87086 URINE CULTURE/COLONY COUNT: CPT

## 2018-12-27 PROCEDURE — 93005 ELECTROCARDIOGRAM TRACING: CPT

## 2018-12-27 PROCEDURE — 99285 EMERGENCY DEPT VISIT HI MDM: CPT

## 2018-12-27 PROCEDURE — 81001 URINALYSIS AUTO W/SCOPE: CPT

## 2018-12-27 PROCEDURE — 71045 X-RAY EXAM CHEST 1 VIEW: CPT

## 2018-12-27 PROCEDURE — 80053 COMPREHEN METABOLIC PANEL: CPT

## 2018-12-27 PROCEDURE — 85027 COMPLETE CBC AUTOMATED: CPT

## 2018-12-27 PROCEDURE — 87040 BLOOD CULTURE FOR BACTERIA: CPT

## 2018-12-27 PROCEDURE — 99238 HOSP IP/OBS DSCHRG MGMT 30/<: CPT

## 2018-12-27 PROCEDURE — 36415 COLL VENOUS BLD VENIPUNCTURE: CPT

## 2018-12-27 PROCEDURE — 80048 BASIC METABOLIC PNL TOTAL CA: CPT

## 2018-12-27 RX ADMIN — PANTOPRAZOLE SODIUM 40 MILLIGRAM(S): 20 TABLET, DELAYED RELEASE ORAL at 06:11

## 2018-12-27 RX ADMIN — Medication 5 MILLIGRAM(S): at 06:11

## 2018-12-27 RX ADMIN — Medication 100 MILLIGRAM(S): at 06:11

## 2018-12-27 RX ADMIN — Medication 25 MILLIGRAM(S): at 06:11

## 2018-12-27 RX ADMIN — DULOXETINE HYDROCHLORIDE 20 MILLIGRAM(S): 30 CAPSULE, DELAYED RELEASE ORAL at 11:56

## 2018-12-27 RX ADMIN — AMLODIPINE BESYLATE 5 MILLIGRAM(S): 2.5 TABLET ORAL at 06:11

## 2018-12-27 RX ADMIN — ENOXAPARIN SODIUM 40 MILLIGRAM(S): 100 INJECTION SUBCUTANEOUS at 11:55

## 2018-12-27 RX ADMIN — Medication 400 MILLIGRAM(S): at 06:11

## 2018-12-27 NOTE — PROGRESS NOTE ADULT - ASSESSMENT
> Fever - No recurrence.  No clear source of infection - CXR, UA unremarkable.  WBC wnl.  Stable for d/c to [facility].   > Vertigo - chronic, unchanged.  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  Responded to Meclizine.  Continue TID. OOB with PT.  JULIAN placement today.   > Essential HTN - Monitor BP.  Continue oral antihypertensives.  > Depression - mood stable, Continue Duloxetine, Quetiapine.  > Crohn's Disease - symptoms controlled - continue Mesalamine.  > Rheumatoid Arthritis - on Prednisone 5mg BID.    > DVT Prophylaxis - Lovenox subcut    Stable for JULIAN.

## 2018-12-27 NOTE — PROGRESS NOTE ADULT - SUBJECTIVE AND OBJECTIVE BOX
Patient: KARSTEN HALE [id number] 83y Female                           Internal Medicine Hospitalist Progress Note    Initial HPI:  83y Female PMH Rheumatoid Arthritis, ,Crohn's Disease, Urinary Incontinence, HTN, Vertigo, c/o dizziness limiting ability to ambulate.  Describes symptoms as chronic, unchanged.  Typically ambulates with walker and assistance of , who is currently admitted in ICU.  Evaluated by SW as pt unable to care for self, unsafe for d/c home.  Fever noted on 12/24 T 101.  Urinalysis, CXR unremarkable.  She has had no recurrence of fever.  Remains off Abx.     Interval History:  No complaints.  No fever / chills.  No urinary complaints.  Mild nonproductive cough.    ____________________PHYSICAL EXAM:  Vitals reviewed as indicated below  GENERAL:  NAD Alert and Oriented x 3   HEENT: NCAT  CARDIOVASCULAR:  S1, S2  LUNGS: coarse bibasilar BS.   ABDOMEN:  soft, (-) tenderness, (-) distension, (+) bowel sounds, (-) guarding, (-) rebound (-) rigidity  EXTREMITIES:  no cyanosis / clubbing / edema.   ____________________    VITALS:  Vital Signs Last 24 Hrs  T(C): 36.6 (27 Dec 2018 08:26), Max: 36.7 (27 Dec 2018 00:52)  T(F): 97.9 (27 Dec 2018 08:26), Max: 98.1 (27 Dec 2018 00:52)  HR: 65 (27 Dec 2018 08:26) (62 - 69)  BP: 127/72 (27 Dec 2018 08:26) (124/60 - 128/80)  BP(mean): --  RR: 18 (27 Dec 2018 08:26) (18 - 18)  SpO2: 96% (27 Dec 2018 08:26) (95% - 96%) Daily     Daily   CAPILLARY BLOOD GLUCOSE        I&O's Summary      LABS:                        13.4   6.1   )-----------( 218      ( 26 Dec 2018 09:26 )             42.7     12-26    142  |  103  |  16.0  ----------------------------<  148<H>  4.2   |  27.0  |  0.71    Ca    8.7      26 Dec 2018 09:26                  MEDICATIONS:  acetaminophen   Tablet .. 650 milliGRAM(s) Oral every 6 hours PRN  amLODIPine   Tablet 5 milliGRAM(s) Oral daily  benzonatate 100 milliGRAM(s) Oral three times a day PRN  DULoxetine 20 milliGRAM(s) Oral daily  enoxaparin Injectable 40 milliGRAM(s) SubCutaneous daily  meclizine 25 milliGRAM(s) Oral every 8 hours  mesalamine DR Capsule 400 milliGRAM(s) Oral three times a day  oxybutynin 5 milliGRAM(s) Oral daily  pantoprazole    Tablet 40 milliGRAM(s) Oral before breakfast  predniSONE   Tablet 5 milliGRAM(s) Oral two times a day  QUEtiapine 25 milliGRAM(s) Oral at bedtime

## 2018-12-29 LAB
CULTURE RESULTS: SIGNIFICANT CHANGE UP
CULTURE RESULTS: SIGNIFICANT CHANGE UP
SPECIMEN SOURCE: SIGNIFICANT CHANGE UP
SPECIMEN SOURCE: SIGNIFICANT CHANGE UP

## 2019-03-07 ENCOUNTER — OUTPATIENT (OUTPATIENT)
Dept: OUTPATIENT SERVICES | Facility: HOSPITAL | Age: 84
LOS: 1 days | End: 2019-03-07
Payer: MEDICARE

## 2019-03-07 DIAGNOSIS — M79.10 MYALGIA, UNSPECIFIED SITE: ICD-10-CM

## 2019-03-07 DIAGNOSIS — R05 COUGH: ICD-10-CM

## 2019-03-07 DIAGNOSIS — Z95.0 PRESENCE OF CARDIAC PACEMAKER: Chronic | ICD-10-CM

## 2019-03-07 PROBLEM — M06.9 RHEUMATOID ARTHRITIS, UNSPECIFIED: Chronic | Status: ACTIVE | Noted: 2018-12-21

## 2019-03-07 PROBLEM — R32 UNSPECIFIED URINARY INCONTINENCE: Chronic | Status: ACTIVE | Noted: 2018-12-21

## 2019-03-07 PROCEDURE — 71046 X-RAY EXAM CHEST 2 VIEWS: CPT | Mod: 26

## 2019-03-07 PROCEDURE — 71100 X-RAY EXAM RIBS UNI 2 VIEWS: CPT | Mod: 26

## 2019-03-07 PROCEDURE — 71100 X-RAY EXAM RIBS UNI 2 VIEWS: CPT

## 2019-03-07 PROCEDURE — 71046 X-RAY EXAM CHEST 2 VIEWS: CPT

## 2019-03-09 ENCOUNTER — EMERGENCY (EMERGENCY)
Facility: HOSPITAL | Age: 84
LOS: 1 days | Discharge: HOME | End: 2019-03-09
Attending: EMERGENCY MEDICINE
Payer: MEDICARE

## 2019-03-09 VITALS
TEMPERATURE: 99 F | SYSTOLIC BLOOD PRESSURE: 149 MMHG | DIASTOLIC BLOOD PRESSURE: 86 MMHG | RESPIRATION RATE: 20 BRPM | OXYGEN SATURATION: 97 % | HEART RATE: 74 BPM

## 2019-03-09 VITALS
DIASTOLIC BLOOD PRESSURE: 78 MMHG | TEMPERATURE: 98 F | RESPIRATION RATE: 20 BRPM | WEIGHT: 149.91 LBS | HEART RATE: 86 BPM | OXYGEN SATURATION: 99 % | SYSTOLIC BLOOD PRESSURE: 140 MMHG | HEIGHT: 66 IN

## 2019-03-09 DIAGNOSIS — Z95.0 PRESENCE OF CARDIAC PACEMAKER: Chronic | ICD-10-CM

## 2019-03-09 LAB
ALBUMIN SERPL ELPH-MCNC: 3.9 G/DL — SIGNIFICANT CHANGE UP (ref 3.3–5.2)
ALP SERPL-CCNC: 68 U/L — SIGNIFICANT CHANGE UP (ref 40–120)
ALT FLD-CCNC: 13 U/L — SIGNIFICANT CHANGE UP
ANION GAP SERPL CALC-SCNC: 13 MMOL/L — SIGNIFICANT CHANGE UP (ref 5–17)
AST SERPL-CCNC: 30 U/L — SIGNIFICANT CHANGE UP
BASOPHILS # BLD AUTO: 0 K/UL — SIGNIFICANT CHANGE UP (ref 0–0.2)
BASOPHILS NFR BLD AUTO: 0.2 % — SIGNIFICANT CHANGE UP (ref 0–2)
BILIRUB SERPL-MCNC: 0.3 MG/DL — LOW (ref 0.4–2)
BUN SERPL-MCNC: 21 MG/DL — HIGH (ref 8–20)
CALCIUM SERPL-MCNC: 9.3 MG/DL — SIGNIFICANT CHANGE UP (ref 8.6–10.2)
CHLORIDE SERPL-SCNC: 103 MMOL/L — SIGNIFICANT CHANGE UP (ref 98–107)
CK MB CFR SERPL CALC: 5.2 NG/ML — SIGNIFICANT CHANGE UP (ref 0–6.7)
CK SERPL-CCNC: 187 U/L — HIGH (ref 25–170)
CO2 SERPL-SCNC: 26 MMOL/L — SIGNIFICANT CHANGE UP (ref 22–29)
CREAT SERPL-MCNC: 0.79 MG/DL — SIGNIFICANT CHANGE UP (ref 0.5–1.3)
EOSINOPHIL # BLD AUTO: 0.1 K/UL — SIGNIFICANT CHANGE UP (ref 0–0.5)
EOSINOPHIL NFR BLD AUTO: 1.2 % — SIGNIFICANT CHANGE UP (ref 0–6)
GLUCOSE SERPL-MCNC: 99 MG/DL — SIGNIFICANT CHANGE UP (ref 70–115)
HCT VFR BLD CALC: 41.8 % — SIGNIFICANT CHANGE UP (ref 37–47)
HGB BLD-MCNC: 13.3 G/DL — SIGNIFICANT CHANGE UP (ref 12–16)
LYMPHOCYTES # BLD AUTO: 2.7 K/UL — SIGNIFICANT CHANGE UP (ref 1–4.8)
LYMPHOCYTES # BLD AUTO: 29.5 % — SIGNIFICANT CHANGE UP (ref 20–55)
MCHC RBC-ENTMCNC: 31 PG — SIGNIFICANT CHANGE UP (ref 27–31)
MCHC RBC-ENTMCNC: 31.8 G/DL — LOW (ref 32–36)
MCV RBC AUTO: 97.4 FL — SIGNIFICANT CHANGE UP (ref 81–99)
MONOCYTES # BLD AUTO: 0.8 K/UL — SIGNIFICANT CHANGE UP (ref 0–0.8)
MONOCYTES NFR BLD AUTO: 9.3 % — SIGNIFICANT CHANGE UP (ref 3–10)
NEUTROPHILS # BLD AUTO: 5.4 K/UL — SIGNIFICANT CHANGE UP (ref 1.8–8)
NEUTROPHILS NFR BLD AUTO: 59.5 % — SIGNIFICANT CHANGE UP (ref 37–73)
NT-PROBNP SERPL-SCNC: 515 PG/ML — HIGH (ref 0–300)
PLATELET # BLD AUTO: 209 K/UL — SIGNIFICANT CHANGE UP (ref 150–400)
POTASSIUM SERPL-MCNC: 4.1 MMOL/L — SIGNIFICANT CHANGE UP (ref 3.5–5.3)
POTASSIUM SERPL-SCNC: 4.1 MMOL/L — SIGNIFICANT CHANGE UP (ref 3.5–5.3)
PROT SERPL-MCNC: 7.3 G/DL — SIGNIFICANT CHANGE UP (ref 6.6–8.7)
RBC # BLD: 4.29 M/UL — LOW (ref 4.4–5.2)
RBC # FLD: 14.7 % — SIGNIFICANT CHANGE UP (ref 11–15.6)
SODIUM SERPL-SCNC: 142 MMOL/L — SIGNIFICANT CHANGE UP (ref 135–145)
TROPONIN T SERPL-MCNC: <0.01 NG/ML — SIGNIFICANT CHANGE UP (ref 0–0.06)
TROPONIN T SERPL-MCNC: <0.01 NG/ML — SIGNIFICANT CHANGE UP (ref 0–0.06)
WBC # BLD: 9.1 K/UL — SIGNIFICANT CHANGE UP (ref 4.8–10.8)
WBC # FLD AUTO: 9.1 K/UL — SIGNIFICANT CHANGE UP (ref 4.8–10.8)

## 2019-03-09 PROCEDURE — 93971 EXTREMITY STUDY: CPT | Mod: 26,LT

## 2019-03-09 PROCEDURE — 84484 ASSAY OF TROPONIN QUANT: CPT

## 2019-03-09 PROCEDURE — 99284 EMERGENCY DEPT VISIT MOD MDM: CPT | Mod: 25

## 2019-03-09 PROCEDURE — 36415 COLL VENOUS BLD VENIPUNCTURE: CPT

## 2019-03-09 PROCEDURE — 82550 ASSAY OF CK (CPK): CPT

## 2019-03-09 PROCEDURE — 93971 EXTREMITY STUDY: CPT

## 2019-03-09 PROCEDURE — 96374 THER/PROPH/DIAG INJ IV PUSH: CPT | Mod: XU

## 2019-03-09 PROCEDURE — 71275 CT ANGIOGRAPHY CHEST: CPT

## 2019-03-09 PROCEDURE — 93010 ELECTROCARDIOGRAM REPORT: CPT

## 2019-03-09 PROCEDURE — 85027 COMPLETE CBC AUTOMATED: CPT

## 2019-03-09 PROCEDURE — 83880 ASSAY OF NATRIURETIC PEPTIDE: CPT

## 2019-03-09 PROCEDURE — 80053 COMPREHEN METABOLIC PANEL: CPT

## 2019-03-09 PROCEDURE — 82553 CREATINE MB FRACTION: CPT

## 2019-03-09 PROCEDURE — 71275 CT ANGIOGRAPHY CHEST: CPT | Mod: 26

## 2019-03-09 PROCEDURE — 93005 ELECTROCARDIOGRAM TRACING: CPT

## 2019-03-09 RX ORDER — KETOROLAC TROMETHAMINE 30 MG/ML
15 SYRINGE (ML) INJECTION ONCE
Qty: 0 | Refills: 0 | Status: DISCONTINUED | OUTPATIENT
Start: 2019-03-09 | End: 2019-03-09

## 2019-03-09 RX ADMIN — Medication 15 MILLIGRAM(S): at 10:27

## 2019-03-09 NOTE — ED PROVIDER NOTE - MUSCULOSKELETAL MINIMAL EXAM
Acutely reproducible anterolateral chest wall tenderness which pt states is consistent with her pain/TENDERNESS

## 2019-03-09 NOTE — ED ADULT TRIAGE NOTE - CHIEF COMPLAINT QUOTE
I'm experiencing left sided chest pain, tender to touch, I think I have blood clot and right foot is swollen.  Denies SOB.

## 2019-03-09 NOTE — ED PROVIDER NOTE - CLINICAL SUMMARY MEDICAL DECISION MAKING FREE TEXT BOX
well appearing female with PPM, HTN, RA, presenting with reproducible, positional chest pain to R pectoralis region 1 day after straining herself to get up from a chair; EKG paced;  Plan to trend troponins;  check CTA for PA/ other lung pathology, reevaluate.

## 2019-03-09 NOTE — ED PROVIDER NOTE - PMH
Cardiac disease  pacemaker 2007  Crohn disease    Depression    High cholesterol    Incontinence of urine in female    Pacemaker, artificial  For the last 10 years, was changed on April 2018  [facility]  Rheumatoid arthritis

## 2019-03-09 NOTE — ED ADULT NURSE NOTE - NSIMPLEMENTINTERV_GEN_ALL_ED
Implemented All Universal Safety Interventions:  [place] to call system. Call bell, personal items and telephone within reach. Instruct patient to call for assistance. Room bathroom lighting operational. Non-slip footwear when patient is off stretcher. Physically safe environment: no spills, clutter or unnecessary equipment. Stretcher in lowest position, wheels locked, appropriate side rails in place.

## 2019-03-09 NOTE — ED PROVIDER NOTE - OBJECTIVE STATEMENT
83yoF with h/o RA, Crohns, HTN, vertigo, pw R lateral chest wall pain onset 3-4 days ago 1 day after she strained hard to push herself up out of a large, soft arm chair.  She states pain is really only present with palpation/ movement/ bending over. She denies fever/chills/ nausea/ vomiting/dyspnea.  Saw her PMD outpatient , Dr. Stein, who ordered xrays , which she states have not resulted; she states she spoke with him yesterday and he instructed her to go to the ER to look for a blood clot if the pain persisted. She has not really been taking anything for the  pain , as she states she has a h/o RA and doesn't like to mix medications.  She denies any h/o MI/ angina, but does have a PPM.  She also notes some swelling to the L foot x several days; but denies any leg/ calf pain or swelling.  Pt states she had similar pain in October and thinks that she had ' water in her lung' at the time, but does not know as she states " I was unconscious" 83yoF with h/o RA, Crohns, HTN, vertigo, pw R lateral chest wall pain onset 3-4 days ago 1 day after she strained hard to push herself up out of a large, soft arm chair, using both arms.  She states pain is really only present with palpation/ movement/ bending over. She denies fever/chills/ nausea/ vomiting/dyspnea.  Saw her PMD outpatient , Dr. Stein, who ordered xrays , which she states have not resulted; she states she spoke with him yesterday and he instructed her to go to the ER to look for a blood clot if the pain persisted. She has not really been taking anything for the  pain , as she states she has a h/o RA and doesn't like to mix medications.  She denies any h/o MI/ angina, but does have a PPM.  She also notes some swelling to the L foot x several days; but denies any leg/ calf pain or swelling.  Pt states she had similar pain in October and thinks that she had ' water in her lung' at the time, but does not know as she states " I was unconscious"

## 2019-03-09 NOTE — ED PROVIDER NOTE - PROGRESS NOTE DETAILS
Pt comfortably laying flat in bed in no distress.  Reports no pain at this time - states pain only happen when she is changing position or bending over; I advised her to f/u with her doctors and to return for any worsening symptoms. Pt states she has " all" of the pain medications at home that she could take as needed.  Pt states she feels well for discharge.

## 2019-03-09 NOTE — ED PROVIDER NOTE - PEDAL EDEMA, LEFT
trace pedal edema; no calf swelling or tenderness. Good coloration/ warmth to b/l feet with cap refill <2s

## 2019-08-26 NOTE — DIETITIAN INITIAL EVALUATION ADULT. - PROBLEM SELECTOR PROBLEM 3
[FreeTextEntry1] : follow up [de-identified] : 70 yr old male with THN, Dyslipidemia elevated LFTs which has resolved is here for a follow up\par Seen by cardiologist 2 weeks ago\par Labs done 7/19, available for review.\par  Leukocytosis, unspecified type

## 2019-11-07 NOTE — PATIENT PROFILE ADULT. - NSSUBSTANCEUSE_GEN_ALL_CORE_SD
----- Message from Bobby Harris sent at 11/7/2019  3:22 PM EST -----  Regarding: RAYMON Reddy/ Telephone  Patient return call    Caller's first and last name and relationship (if not the patient):      Best contact number(s):  [phone]    Whose call is being returned:  Not sure     Details to clarify the request:  Does not know. Pt is allows for a VM to be left.     Bobby Harris never used

## 2020-01-16 ENCOUNTER — APPOINTMENT (OUTPATIENT)
Dept: DERMATOLOGY | Facility: CLINIC | Age: 85
End: 2020-01-16
Payer: MEDICARE

## 2020-01-16 PROCEDURE — 99214 OFFICE O/P EST MOD 30 MIN: CPT

## 2020-02-17 ENCOUNTER — INPATIENT (INPATIENT)
Facility: HOSPITAL | Age: 85
LOS: 2 days | Discharge: HOME | DRG: 229 | End: 2020-02-20
Attending: HOSPITALIST | Admitting: HOSPITALIST
Payer: MEDICARE

## 2020-02-17 VITALS
OXYGEN SATURATION: 95 % | HEIGHT: 67 IN | DIASTOLIC BLOOD PRESSURE: 66 MMHG | WEIGHT: 199.96 LBS | HEART RATE: 39 BPM | TEMPERATURE: 98 F | SYSTOLIC BLOOD PRESSURE: 166 MMHG | RESPIRATION RATE: 18 BRPM

## 2020-02-17 DIAGNOSIS — T82.111A BREAKDOWN (MECHANICAL) OF CARDIAC PULSE GENERATOR (BATTERY), INITIAL ENCOUNTER: ICD-10-CM

## 2020-02-17 DIAGNOSIS — Z95.0 PRESENCE OF CARDIAC PACEMAKER: Chronic | ICD-10-CM

## 2020-02-17 LAB
ALBUMIN SERPL ELPH-MCNC: 3.5 G/DL — SIGNIFICANT CHANGE UP (ref 3.3–5.2)
ALP SERPL-CCNC: 68 U/L — SIGNIFICANT CHANGE UP (ref 40–120)
ALT FLD-CCNC: 20 U/L — SIGNIFICANT CHANGE UP
ANION GAP SERPL CALC-SCNC: 9 MMOL/L — SIGNIFICANT CHANGE UP (ref 5–17)
APTT BLD: 30 SEC — SIGNIFICANT CHANGE UP (ref 27.5–36.3)
AST SERPL-CCNC: 53 U/L — HIGH
BASOPHILS # BLD AUTO: 0.03 K/UL — SIGNIFICANT CHANGE UP (ref 0–0.2)
BASOPHILS NFR BLD AUTO: 0.4 % — SIGNIFICANT CHANGE UP (ref 0–2)
BILIRUB SERPL-MCNC: 0.6 MG/DL — SIGNIFICANT CHANGE UP (ref 0.4–2)
BUN SERPL-MCNC: 12 MG/DL — SIGNIFICANT CHANGE UP (ref 8–20)
CALCIUM SERPL-MCNC: 9.2 MG/DL — SIGNIFICANT CHANGE UP (ref 8.6–10.2)
CHLORIDE SERPL-SCNC: 98 MMOL/L — SIGNIFICANT CHANGE UP (ref 98–107)
CO2 SERPL-SCNC: 28 MMOL/L — SIGNIFICANT CHANGE UP (ref 22–29)
CREAT SERPL-MCNC: 0.59 MG/DL — SIGNIFICANT CHANGE UP (ref 0.5–1.3)
EOSINOPHIL # BLD AUTO: 0.06 K/UL — SIGNIFICANT CHANGE UP (ref 0–0.5)
EOSINOPHIL NFR BLD AUTO: 0.9 % — SIGNIFICANT CHANGE UP (ref 0–6)
GLUCOSE SERPL-MCNC: 101 MG/DL — HIGH (ref 70–99)
HCT VFR BLD CALC: 40.5 % — SIGNIFICANT CHANGE UP (ref 34.5–45)
HGB BLD-MCNC: 12.5 G/DL — SIGNIFICANT CHANGE UP (ref 11.5–15.5)
IMM GRANULOCYTES NFR BLD AUTO: 0.1 % — SIGNIFICANT CHANGE UP (ref 0–1.5)
INR BLD: 1.13 RATIO — SIGNIFICANT CHANGE UP (ref 0.88–1.16)
LYMPHOCYTES # BLD AUTO: 3.11 K/UL — SIGNIFICANT CHANGE UP (ref 1–3.3)
LYMPHOCYTES # BLD AUTO: 45.9 % — HIGH (ref 13–44)
MAGNESIUM SERPL-MCNC: 1.9 MG/DL — SIGNIFICANT CHANGE UP (ref 1.6–2.6)
MCHC RBC-ENTMCNC: 28.9 PG — SIGNIFICANT CHANGE UP (ref 27–34)
MCHC RBC-ENTMCNC: 30.9 GM/DL — LOW (ref 32–36)
MCV RBC AUTO: 93.5 FL — SIGNIFICANT CHANGE UP (ref 80–100)
MONOCYTES # BLD AUTO: 0.95 K/UL — HIGH (ref 0–0.9)
MONOCYTES NFR BLD AUTO: 14 % — SIGNIFICANT CHANGE UP (ref 2–14)
NEUTROPHILS # BLD AUTO: 2.61 K/UL — SIGNIFICANT CHANGE UP (ref 1.8–7.4)
NEUTROPHILS NFR BLD AUTO: 38.7 % — LOW (ref 43–77)
PHOSPHATE SERPL-MCNC: 2.9 MG/DL — SIGNIFICANT CHANGE UP (ref 2.4–4.7)
PLATELET # BLD AUTO: 206 K/UL — SIGNIFICANT CHANGE UP (ref 150–400)
POTASSIUM SERPL-MCNC: 4.8 MMOL/L — SIGNIFICANT CHANGE UP (ref 3.5–5.3)
POTASSIUM SERPL-SCNC: 4.8 MMOL/L — SIGNIFICANT CHANGE UP (ref 3.5–5.3)
PROT SERPL-MCNC: 7.1 G/DL — SIGNIFICANT CHANGE UP (ref 6.6–8.7)
PROTHROM AB SERPL-ACNC: 12.8 SEC — SIGNIFICANT CHANGE UP (ref 10–12.9)
RBC # BLD: 4.33 M/UL — SIGNIFICANT CHANGE UP (ref 3.8–5.2)
RBC # FLD: 14.8 % — HIGH (ref 10.3–14.5)
SODIUM SERPL-SCNC: 135 MMOL/L — SIGNIFICANT CHANGE UP (ref 135–145)
T4 AB SER-ACNC: 6.3 UG/DL — SIGNIFICANT CHANGE UP (ref 4.5–12)
TROPONIN T SERPL-MCNC: <0.01 NG/ML — SIGNIFICANT CHANGE UP (ref 0–0.06)
TSH SERPL-MCNC: 1.87 UIU/ML — SIGNIFICANT CHANGE UP (ref 0.27–4.2)
WBC # BLD: 6.77 K/UL — SIGNIFICANT CHANGE UP (ref 3.8–10.5)
WBC # FLD AUTO: 6.77 K/UL — SIGNIFICANT CHANGE UP (ref 3.8–10.5)

## 2020-02-17 PROCEDURE — 93010 ELECTROCARDIOGRAM REPORT: CPT

## 2020-02-17 PROCEDURE — 99291 CRITICAL CARE FIRST HOUR: CPT

## 2020-02-17 PROCEDURE — 70450 CT HEAD/BRAIN W/O DYE: CPT | Mod: 26

## 2020-02-17 PROCEDURE — 71045 X-RAY EXAM CHEST 1 VIEW: CPT | Mod: 26

## 2020-02-17 PROCEDURE — 99223 1ST HOSP IP/OBS HIGH 75: CPT

## 2020-02-17 RX ORDER — ONDANSETRON 8 MG/1
4 TABLET, FILM COATED ORAL EVERY 6 HOURS
Refills: 0 | Status: DISCONTINUED | OUTPATIENT
Start: 2020-02-17 | End: 2020-02-20

## 2020-02-17 RX ORDER — SODIUM CHLORIDE 9 MG/ML
3 INJECTION INTRAMUSCULAR; INTRAVENOUS; SUBCUTANEOUS EVERY 8 HOURS
Refills: 0 | Status: DISCONTINUED | OUTPATIENT
Start: 2020-02-17 | End: 2020-02-20

## 2020-02-17 RX ORDER — AMLODIPINE BESYLATE 2.5 MG/1
5 TABLET ORAL DAILY
Refills: 0 | Status: DISCONTINUED | OUTPATIENT
Start: 2020-02-17 | End: 2020-02-19

## 2020-02-17 RX ORDER — MESALAMINE 400 MG
400 TABLET, DELAYED RELEASE (ENTERIC COATED) ORAL THREE TIMES A DAY
Refills: 0 | Status: DISCONTINUED | OUTPATIENT
Start: 2020-02-17 | End: 2020-02-20

## 2020-02-17 RX ORDER — TOFACITINIB 11 MG/1
0 TABLET, FILM COATED, EXTENDED RELEASE ORAL
Qty: 0 | Refills: 0 | DISCHARGE

## 2020-02-17 RX ORDER — QUETIAPINE FUMARATE 200 MG/1
25 TABLET, FILM COATED ORAL AT BEDTIME
Refills: 0 | Status: DISCONTINUED | OUTPATIENT
Start: 2020-02-17 | End: 2020-02-20

## 2020-02-17 RX ORDER — MIRABEGRON 50 MG/1
1 TABLET, EXTENDED RELEASE ORAL
Qty: 0 | Refills: 0 | DISCHARGE

## 2020-02-17 RX ORDER — SIMVASTATIN 20 MG/1
1 TABLET, FILM COATED ORAL
Qty: 0 | Refills: 0 | DISCHARGE

## 2020-02-17 RX ORDER — SIMVASTATIN 20 MG/1
20 TABLET, FILM COATED ORAL AT BEDTIME
Refills: 0 | Status: DISCONTINUED | OUTPATIENT
Start: 2020-02-17 | End: 2020-02-20

## 2020-02-17 RX ORDER — HYDRALAZINE HCL 50 MG
25 TABLET ORAL ONCE
Refills: 0 | Status: COMPLETED | OUTPATIENT
Start: 2020-02-17 | End: 2020-02-17

## 2020-02-17 RX ADMIN — QUETIAPINE FUMARATE 25 MILLIGRAM(S): 200 TABLET, FILM COATED ORAL at 23:53

## 2020-02-17 RX ADMIN — Medication 25 MILLIGRAM(S): at 18:11

## 2020-02-17 NOTE — H&P ADULT - PROBLEM SELECTOR PLAN 1
Admit to CTU now that PPM capturing, keep pacer pads on, Hold AVB agents. NPO for lead replacement in AM, OOB, Ambulate with assistance, fall risk.

## 2020-02-17 NOTE — ED ADULT NURSE REASSESSMENT NOTE - NS ED NURSE REASSESS COMMENT FT1
assumed care of pt, pt returned from ct scan with RN on pads/zoll monitor. pt denies pain, c/o intermittent dizziness for unknown period of time.

## 2020-02-17 NOTE — ED ADULT NURSE REASSESSMENT NOTE - NS ED NURSE REASSESS COMMENT FT1
awaiting ICU eval and admission orders, pt denies chest pain or feeling dizzy, will continue to monitor

## 2020-02-17 NOTE — ED PROVIDER NOTE - CARE PLAN
Principal Discharge DX:	Pacemaker malfunction, initial encounter  Secondary Diagnosis:	Symptomatic bradycardia  Secondary Diagnosis:	Third degree heart block  Secondary Diagnosis:	Altered mental status, unspecified altered mental status type

## 2020-02-17 NOTE — ED ADULT NURSE REASSESSMENT NOTE - NS ED NURSE REASSESS COMMENT FT1
Pt care endorsed at this time in critical care room , pt maintained on pacemaker pads , denies any chest pain , VSS , awaiting cardiologist eval and pacemaker interrogation , will continue to monitor Pt care assumed  at this time in critical care room , pt maintained on pacemaker pads , denies any chest pain , VSS , awaiting cardiologist eval and pacemaker interrogation , will continue to monitor

## 2020-02-17 NOTE — CONSULT NOTE ADULT - SUBJECTIVE AND OBJECTIVE BOX
[facility]                                          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  Pt mildly confused.  Weakness has been present for a few days, no clear progression of symptoms.  In ER found to be in high degree AVB with bradycardia.  She is known to be PPM dependent (99% V pacing at time of last interrogation).  She reports occasional dizziness, none recently. Denies chest pain, palpitations, and syncope.  Denies SOB, DAVIS, orthopnea, PND, and edema.  No fever/chills/cough.  Medtronic device interrogation suggests ventricular lead fracture some time in January.  Settings changed to maximize ventricular output, and no V pacing asynchronously at 75bpm.  No change in symptoms with increased HR.        PAST MEDICAL & SURGICAL HISTORY:  Pacemaker, artificial: For the last 10 years, was changed on April 2018  [facility]  Incontinence of urine in female  Rheumatoid arthritis  Depression  Cardiac disease: pacemaker 2007  High cholesterol  Crohn disease  Pacemaker      sulfa drugs (Anaphylaxis)      MEDICATIONS  (STANDING): ASA 81, zocor 20, cymbalta 60, ambien, omeprazole, vit b12, prednisone 5mg BID, Apriso          Social History: no active (former) tobacco / EtOH / IVDA    Family History: mother had MI in her 60s, no CMP of SCD in family.  No CVD in father or siblings.      ROS: As noted above, otherwise unremarkable.    Vital Signs Last 24 Hrs  T(C): 36.9 (17 Feb 2020 15:09), Max: 36.9 (17 Feb 2020 15:09)  T(F): 98.4 (17 Feb 2020 15:09), Max: 98.4 (17 Feb 2020 15:09)  HR: 60 (17 Feb 2020 17:59) (35 - 60)  BP: 199/85 (17 Feb 2020 17:59) (129/61 - 199/85)  BP(mean): --  RR: 18 (17 Feb 2020 17:59) (17 - 18)  SpO2: 98% (17 Feb 2020 17:59) (95% - 98%)    I&O's Detail    I&O's Summary          PHYSICAL EXAM:  General: Appears well developed, well nourished, no acute distress  HEENT: Head: normocephalic, atraumatic  Eyes: Pupils equal and reactive  Neck: Supple, no carotid bruit, no JVD, no HJR  CARDIOVASCULAR: Normal S1 and S2, no murmur, rub, or gallop  CHEST: PPM site left upper chest c/d/i  LUNGS: Clear to auscultation bilaterally, no rales, rhonchi or wheeze  ABDOMEN: Soft, nontender, non-distended, positive bowel sounds, no mass or bruit  EXTREMITIES: No edema, distal pulses WNL  SKIN: Warm and dry with normal turgor  NEURO: Alert & oriented x 3, grossly intact  PSYCH: normal mood and affect    LABS:                        12.5   6.77  )-----------( 206      ( 17 Feb 2020 16:04 )             40.5     02-17    135  |  98  |  12.0  ----------------------------<  101<H>  4.8   |  28.0  |  0.59    Ca    9.2      17 Feb 2020 16:04  Phos  2.9     02-17  Mg     1.9     02-17    TPro  7.1  /  Alb  3.5  /  TBili  0.6  /  DBili  x   /  AST  53<H>  /  ALT  20  /  AlkPhos  68  02-17    CARDIAC MARKERS ( 17 Feb 2020 16:04 )  x     / <0.01 ng/mL / x     / x     / x          PT/INR - ( 17 Feb 2020 16:04 )   PT: 12.8 sec;   INR: 1.13 ratio         PTT - ( 17 Feb 2020 16:04 )  PTT:30.0 sec  Thyroid Stimulating Hormone, Serum: 1.87 uIU/mL (02-17 @ 16:04)      RADIOLOGY & ADDITIONAL STUDIES:    ECG: SR with 2:1 AVB (likely Mobitz II), LVH, LBBB, LAD    ECHO: 10/2018 office EF 55-60%, moderate concentric LVH, impaired LV relaxation, mild AI/MR, device wire seen, inappropriate PPM spikes.  Echo from [facility] 10/2018 showed EF 45-50%.    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  She was found to be bradycardic with high degree AVB and evidence of RV lead fracture on Medtronic PPM interrogation.  Settings adjusted to maximize output with resultant ventricular capture.    - Device interrogation suggests that lead fracture occurred some time in January.  With increased HR after setting adjustment, her symptoms did not change, suggesting that this is not an acute change.  - Current device setting is VOO at 75bpm, RV output 8V @ 1ms.   - ECHO: 10/2018 office EF 55-60%, moderate concentric LVH, impaired LV relaxation, mild AI/MR, device wire seen, inappropriate PPM spikes.  Echo from [facility] 10/2018 showed EF 45-50%.  Will order repeat echo to be done ASAP  - Continue ASA 81 and Zocor 20 for now  - BP elevated during device interrogation.  Will give Hydralazine 25mg PO x 1 and reevaluate.  Can give non-AV ciaran agents as needed for BP control  - NPO p MN for RV lead revision tomorrow.  This was discussed with pt and she is agreeable.  - There is no indication for TVP at the current time.    - Can be admitted to telemetry.  Tele will show asynchronous V pacing at 75bpm.  An occasional PVC (+/- fusion beat) may be seen.  - d/w Dr. Kaiser (EP)  - d/w RN and Dr. Palacios    We will follow with you.  Thank you for allowing me to participate in the care of your patient.      Sincerely,    Dedrick Mota MD [facility]                                          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  Pt mildly confused.  Weakness has been present for a few days, no clear progression of symptoms.  In ER found to be in high degree AVB with bradycardia.  She is known to be PPM dependent (99% V pacing at time of last interrogation).  She reports occasional dizziness, none recently. Denies chest pain, palpitations, and syncope.  Denies SOB, DAVIS, orthopnea, PND, and edema.  No fever/chills/cough.  Medtronic device interrogation suggests ventricular lead fracture some time in January.  Settings changed to maximize ventricular output, and no V pacing asynchronously at 75bpm.  No change in symptoms with increased HR.        PAST MEDICAL & SURGICAL HISTORY:  Pacemaker, artificial: For the last 10 years, was changed on April 2018  [facility]  Incontinence of urine in female  Rheumatoid arthritis  Depression  Cardiac disease: pacemaker 2007  High cholesterol  Crohn disease  Pacemaker      sulfa drugs (Anaphylaxis)      MEDICATIONS  (STANDING): ASA 81, zocor 20, cymbalta 60, ambien, omeprazole, vit b12, prednisone 5mg BID, Apriso          Social History: no active (former) tobacco / EtOH / IVDA    Family History: mother had MI in her 60s, no CMP of SCD in family.  No CVD in father or siblings.      ROS: As noted above, otherwise unremarkable.    Vital Signs Last 24 Hrs  T(C): 36.9 (17 Feb 2020 15:09), Max: 36.9 (17 Feb 2020 15:09)  T(F): 98.4 (17 Feb 2020 15:09), Max: 98.4 (17 Feb 2020 15:09)  HR: 60 (17 Feb 2020 17:59) (35 - 60)  BP: 199/85 (17 Feb 2020 17:59) (129/61 - 199/85)  BP(mean): --  RR: 18 (17 Feb 2020 17:59) (17 - 18)  SpO2: 98% (17 Feb 2020 17:59) (95% - 98%)    I&O's Detail    I&O's Summary          PHYSICAL EXAM:  General: Appears well developed, well nourished, no acute distress  HEENT: Head: normocephalic, atraumatic  Eyes: Pupils equal and reactive  Neck: Supple, no carotid bruit, no JVD, no HJR  CARDIOVASCULAR: Normal S1 and S2, no murmur, rub, or gallop  CHEST: PPM site left upper chest c/d/i  LUNGS: Clear to auscultation bilaterally, no rales, rhonchi or wheeze  ABDOMEN: Soft, nontender, non-distended, positive bowel sounds, no mass or bruit  EXTREMITIES: No edema, distal pulses WNL  SKIN: Warm and dry with normal turgor  NEURO: Alert & oriented x 3, grossly intact  PSYCH: normal mood and affect    LABS:                        12.5   6.77  )-----------( 206      ( 17 Feb 2020 16:04 )             40.5     02-17    135  |  98  |  12.0  ----------------------------<  101<H>  4.8   |  28.0  |  0.59    Ca    9.2      17 Feb 2020 16:04  Phos  2.9     02-17  Mg     1.9     02-17    TPro  7.1  /  Alb  3.5  /  TBili  0.6  /  DBili  x   /  AST  53<H>  /  ALT  20  /  AlkPhos  68  02-17    CARDIAC MARKERS ( 17 Feb 2020 16:04 )  x     / <0.01 ng/mL / x     / x     / x          PT/INR - ( 17 Feb 2020 16:04 )   PT: 12.8 sec;   INR: 1.13 ratio         PTT - ( 17 Feb 2020 16:04 )  PTT:30.0 sec  Thyroid Stimulating Hormone, Serum: 1.87 uIU/mL (02-17 @ 16:04)      RADIOLOGY & ADDITIONAL STUDIES:    ECG: SR with 2:1 AVB (likely Mobitz II), LVH, LBBB, LAD    ECHO: 10/2018 office EF 55-60%, moderate concentric LVH, impaired LV relaxation, mild AI/MR, device wire seen, inappropriate PPM spikes.  Echo from [facility] 10/2018 showed EF 45-50%.    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  She was found to be bradycardic with high degree AVB and evidence of RV lead fracture on Medtronic PPM interrogation.  Settings adjusted to maximize output with resultant ventricular capture.    - Device interrogation suggests that lead fracture occurred some time in January.  With increased HR after setting adjustment, her symptoms did not change, suggesting that this is not an acute change.  - Current device setting is VOO at 75bpm, RV output 8V @ 1ms.   - ECHO: 10/2018 office EF 55-60%, moderate concentric LVH, impaired LV relaxation, mild AI/MR, device wire seen, inappropriate PPM spikes.  Echo from [facility] 10/2018 showed EF 45-50%.  Will order repeat echo to be done ASAP  - Continue ASA 81 and Zocor 20 for now  - BP elevated during device interrogation.  Will give Hydralazine 25mg PO x 1 and reevaluate.  Can give non-AV ciaran agents as needed for BP control  - NPO p MN for RV lead revision tomorrow.  This was discussed with pt and she is agreeable.  - There is no indication for TVP at the current time.    - Can be admitted to telemetry.  Tele will show asynchronous V pacing at 75bpm.  An occasional PVC (+/- fusion beat) may be seen.  - d/w Dr. Kaiser (EP)  - d/w RN and Dr. Palacios    We will follow with you.  Thank you for allowing me to participate in the care of your patient.      Sincerely,    Dedrick Mota MD     (47min of critical care time spent providing direct patient care, reviewing chart/notes, and coordinating patient care, independent of procedure time) [facility]                                          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  Pt mildly confused.  Weakness has been present for a few days, no clear progression of symptoms.  In ER found to be in high degree AVB with bradycardia.  She is known to be PPM dependent (99% V pacing at time of last interrogation).  She reports occasional dizziness, none recently. Denies chest pain, palpitations, and syncope.  Denies SOB, DAVIS, orthopnea, PND, and edema.  No fever/chills/cough.  Medtronic device interrogation suggests ventricular lead fracture some time in January.  Settings changed to maximize ventricular output, and no V pacing asynchronously at 75bpm.  No change in symptoms with increased HR.        PAST MEDICAL & SURGICAL HISTORY:  Pacemaker, artificial: For the last 10 years, was changed on April 2018  [facility]  Incontinence of urine in female  Rheumatoid arthritis  Depression  Cardiac disease: pacemaker 2007  High cholesterol  Crohn disease  Pacemaker      sulfa drugs (Anaphylaxis)      MEDICATIONS  (STANDING): ASA 81, zocor 20, cymbalta 60, ambien, omeprazole, vit b12, prednisone 5mg BID, Apriso          Social History: no active (former) tobacco / EtOH / IVDA    Family History: mother had MI in her 60s, no CMP of SCD in family.  No CVD in father or siblings.      ROS: As noted above, otherwise unremarkable.    Vital Signs Last 24 Hrs  T(C): 36.9 (17 Feb 2020 15:09), Max: 36.9 (17 Feb 2020 15:09)  T(F): 98.4 (17 Feb 2020 15:09), Max: 98.4 (17 Feb 2020 15:09)  HR: 60 (17 Feb 2020 17:59) (35 - 60)  BP: 199/85 (17 Feb 2020 17:59) (129/61 - 199/85)  BP(mean): --  RR: 18 (17 Feb 2020 17:59) (17 - 18)  SpO2: 98% (17 Feb 2020 17:59) (95% - 98%)    I&O's Detail    I&O's Summary          PHYSICAL EXAM:  General: Appears well developed, well nourished, no acute distress  HEENT: Head: normocephalic, atraumatic  Eyes: Pupils equal and reactive  Neck: Supple, no carotid bruit, no JVD, no HJR  CARDIOVASCULAR: Normal S1 and S2, no murmur, rub, or gallop  CHEST: PPM site left upper chest c/d/i  LUNGS: Clear to auscultation bilaterally, no rales, rhonchi or wheeze  ABDOMEN: Soft, nontender, non-distended, positive bowel sounds, no mass or bruit  EXTREMITIES: No edema, distal pulses WNL  SKIN: Warm and dry with normal turgor  NEURO: Alert & oriented x 3, grossly intact  PSYCH: normal mood and affect    LABS:                        12.5   6.77  )-----------( 206      ( 17 Feb 2020 16:04 )             40.5     02-17    135  |  98  |  12.0  ----------------------------<  101<H>  4.8   |  28.0  |  0.59    Ca    9.2      17 Feb 2020 16:04  Phos  2.9     02-17  Mg     1.9     02-17    TPro  7.1  /  Alb  3.5  /  TBili  0.6  /  DBili  x   /  AST  53<H>  /  ALT  20  /  AlkPhos  68  02-17    CARDIAC MARKERS ( 17 Feb 2020 16:04 )  x     / <0.01 ng/mL / x     / x     / x          PT/INR - ( 17 Feb 2020 16:04 )   PT: 12.8 sec;   INR: 1.13 ratio         PTT - ( 17 Feb 2020 16:04 )  PTT:30.0 sec  Thyroid Stimulating Hormone, Serum: 1.87 uIU/mL (02-17 @ 16:04)      RADIOLOGY & ADDITIONAL STUDIES:    ECG: SR with 2:1 AVB (likely Mobitz II), LVH, LBBB, LAD    ECHO: 10/2018 office EF 55-60%, moderate concentric LVH, impaired LV relaxation, mild AI/MR, device wire seen, inappropriate PPM spikes.  Echo from [facility] 10/2018 showed EF 45-50%.    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  She was found to be bradycardic with high degree AVB and evidence of RV lead fracture on Medtronic PPM interrogation.  Settings adjusted to maximize output with resultant ventricular capture.    - Device interrogation suggests that lead fracture occurred some time in January.  With increased HR after setting adjustment, her symptoms did not change, suggesting that this is not an acute change.  - Current device setting is VOO at 75bpm, RV output 8V @ 1ms.   - ECHO: 10/2018 office EF 55-60%, moderate concentric LVH, impaired LV relaxation, mild AI/MR, device wire seen, inappropriate PPM spikes.  Echo from [facility] 10/2018 showed EF 45-50%.  Will order repeat echo to be done ASAP  - Continue ASA 81 and Zocor 20 for now  - BP elevated during device interrogation.  Will give Hydralazine 25mg PO x 1 and reevaluate.  Can give non-AV ciaran agents as needed for BP control  - NPO p MN for RV lead revision tomorrow.  This was discussed with pt and she is agreeable.  - There is no indication for TVP at the current time.    - Can be admitted to telemetry.  Tele will show asynchronous V pacing at 75bpm.  An occasional PVC (+/- fusion beat) may be seen.  - d/w Dr. Kaiser (EP)  - d/w RN and Dr. Palacios    We will follow with you.  Thank you for allowing me to participate in the care of your patient.      Sincerely,    Dedrick Mota MD

## 2020-02-17 NOTE — ED ADULT TRIAGE NOTE - CHIEF COMPLAINT QUOTE
pt arrive by ambulance with reports by EMS of worsened confusion x2 days, baseline mild, at this time pt only oriented to self. HR noted by EMS in 30s. pt arrive by ambulance with reports by EMS of worsened confusion x2 days, baseline mild, at this time pt only oriented to self. HR noted by EMS in 30s. pt with pacemaker.

## 2020-02-17 NOTE — ED PROVIDER NOTE - SECONDARY DIAGNOSIS.
Symptomatic bradycardia Third degree heart block Altered mental status, unspecified altered mental status type

## 2020-02-17 NOTE — ED PROVIDER NOTE - CLINICAL SUMMARY MEDICAL DECISION MAKING FREE TEXT BOX
85 y/o F with confusion, found to be in 3rd degree HB, hemodynamically stable, plan= cardiac monitor, labs, keep pt on pads and admit, cardiology consult, PPM interrogation once able to figure out model; will transcutaneously pace if becomes unstable

## 2020-02-17 NOTE — ED PROVIDER NOTE - PROGRESS NOTE DETAILS
Return call received from Dr Mota (cardiology), pt to be admitted, will attempt to interrogate PPM once able to find to model/ make, pt remains stable although bradycardic in 3rd degree block  now at bedside, states patient confused and weak x several weeks, worse today. Denies fever, chills, dysuria, hematuria. States general decrease in appetite and function over serval weeks. Also reports PPM is medtronic had battery replaced 2 years ago but waspalced over 10 years ago. Discussed with patient results of work up.   expressed understanding and agrees with plan. Called to bedside as cardiac monitor alarming "V TACH". Upon further eval, pt noted not to be in VTACH but rather to have serval episodes of isoventricular rhythm HR in 70's lasting for aprox 30 seconds at times. Patient remains fully alert during these events and denies CP or palpitations, remains on Zoll. Will consult MICU Consult to MICU cancelled as Medtronic team now at bedside. Concern for lead fracture, Medtronic team changing settings on PPM and pt now paced in 80s. Plan to admit to tele for lead revision tomorrow, pending head CT and UA

## 2020-02-17 NOTE — ED PROVIDER NOTE - OBJECTIVE STATEMENT
83 y/o F with PMH chrons, HLD, and PPM presents from home with c/o confusion. pt is poor historian, Dneies pain, just reports feeling "weak". Pt A&O to person and place, noted to be bradycardic on exam and EKG reveals 3rd degree block, pt placed on defib pads and put in critical bay.

## 2020-02-17 NOTE — H&P ADULT - HISTORY OF PRESENT ILLNESS
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  Pt mildly confused.  Weakness has been present for a few days, no clear progression of symptoms.  In ER found to be in high degree AVB with bradycardia.  She is known to be PPM dependent (99% V pacing at time of last interrogation). Patient denies any chest wall trauma, fever, chills, N/V/D, or focal weakness. 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  Pt mildly confused.  Weakness has been present for a few days, no clear progression of symptoms.  In ER found to be in high degree AVB with bradycardia.  She is known to be PPM dependent (99% V pacing at time of last interrogation). Patient denies any chest wall trauma, fever, chills, N/V/D, or focal weakness. No falls. In the ED patient found to be bradycardic and has high grade AVB due to fractured ppm lead. Seen by Cardiology and PPM setting adjusted to increase output and currently with capture and normal vitals. Will be admitted for new lead placement in AM.

## 2020-02-17 NOTE — ED PROVIDER NOTE - EKG ADDITIONAL QUESTION - PERFORMED INDEPENDENT VISUALIZATION
D/C, follow up, and home care discussed. Red, bloody urine noted in ferris bag. No c/o, patient leaves via w/c with wife. Leg strap provided, all questions answered.    Yes

## 2020-02-17 NOTE — ED PROVIDER NOTE - ATTENDING CONTRIBUTION TO CARE
I, Geoffrey Palacios, performed a face to face bedside interview with this patient regarding history of present illness, review of symptoms and relevant past medical, social and family history.  I completed an independent physical examination. I have communicated the patient’s plan of care and disposition with the ACP.  84 year old female with PMh Crohn's disease, PPM presents with generalzied weakness. The pt reports Sx have been present for several days, associated lightheadedness, denies chest pain, SOB, blurry vision  Gen: NAD, well appearing  CV: RRR  Pul: CTA b/l  Abd: Soft, non-distended, non-tender  Neuro: no focal deficits, oriented to person only  Pt with likely leads fracture, settings adjusted by cardio/medtronic and now with capture, will admit for tele and pacer revision tomorrow

## 2020-02-17 NOTE — ED ADULT NURSE REASSESSMENT NOTE - NSIMPLEMENTINTERV_GEN_ALL_ED
Implemented All Fall with Harm Risk Interventions:  [place] to call system. Call bell, personal items and telephone within reach. Instruct patient to call for assistance. Room bathroom lighting operational. Non-slip footwear when patient is off stretcher. Physically safe environment: no spills, clutter or unnecessary equipment. Stretcher in lowest position, wheels locked, appropriate side rails in place. Provide visual cue, wrist band, yellow gown, etc. Monitor gait and stability. Monitor for mental status changes and reorient to person, place, and time. Review medications for side effects contributing to fall risk. Reinforce activity limits and safety measures with patient and family. Provide visual clues: red socks.

## 2020-02-17 NOTE — ED PROVIDER NOTE - CRITICAL CARE PROVIDED
additional history taking/consultation with other physicians/direct patient care (not related to procedure)/interpretation of diagnostic studies/conducted a detailed discussion of DNR status

## 2020-02-17 NOTE — H&P ADULT - NSICDXPASTMEDICALHX_GEN_ALL_CORE_FT
PAST MEDICAL HISTORY:  Cardiac disease pacemaker 2007    Crohn disease     Depression     High cholesterol     Incontinence of urine in female     Pacemaker, artificial For the last 10 years, was changed on April 2018  [facility]    Rheumatoid arthritis

## 2020-02-17 NOTE — H&P ADULT - ASSESSMENT
85 y/o female with hx of PPM dependency now with fx PPM lead and AVB, hx of Crohns, HLD, MCI, Depression, GERD

## 2020-02-17 NOTE — ED ADULT NURSE REASSESSMENT NOTE - NSFALLRSKINDICTYPE_ED_ALL_ED
I have reviewed and agree with nurse's note and vital signs listed above. SUBJECTIVE:  Clayton Mcgill comes in for follow up Hypertension, Hyperlipidemia, Mild Glucose intolerance, GERD, and peripheral neuropathy. Pt continues off Lisinopril as her BP readings have been very good since losing weight. Continues to go through a weight loss program with a chiropracter - Dr. Yas Umana about a year ago. Pt no longer is taking Atorvastatin. No chest pain or edema or SOB. No dizziness or lightheadedness. Reports the Meloxicam works well for osteoarthritis pains. She also has tried CBD oil. Rarely takes an Oxycodone. Allergies and medications reviewed. Current Outpatient Medications   Medication Sig   â¢ cannabidiol (CBD) Take by mouth every other day. â¢ Cyanocobalamin (B-12 PO) Take by mouth daily. â¢ COLLAGEN PO Take by mouth daily. â¢ BIOTIN PO Take by mouth daily. â¢ triamcinolone (ARISTOCORT) 0.1 % cream Apply topically 2 times daily. â¢ meloxicam (MOBIC) 15 MG tablet TAKE 1/2 TABLET BY MOUTH EVERY DAY   â¢ aspirin 81 MG tablet Take 1 tablet by mouth daily. â¢ alclomethasone (ACLOVATE) 0.05 % ointment Apply to affected areas daily prn. â¢ MULTIVITAMINS PO TABS One tablet daily   â¢ CALCIUM + D 600-200 MG-UNIT PO TABS 1 TABLET DAILY   â¢ oxyCODONE/APAP (PERCOCET) 5-325 MG per tablet Take 1 tablet by mouth every 8 hours as needed for Pain. No current facility-administered medications for this visit. OBJECTIVE:  Visit Vitals  /80 (BP Location: [facility], Patient Position: Sitting, Cuff Size: Regular)   Pulse 72   Wt 70.9 kg   SpO2 97%   BMI 27.24 kg/mÂ²       BP by my recheck: 132/74    General: No acute distress, alert, healthy appearing       Assessment-Hypertension - pt no longer needing Lisinopril since weight loss             Hyperlipidemia - pt stopped Atorvastatin              Hx of lumbar stenosis and degenerative disc disease.              GERD Hx of eczema. Peripheral neuropathy - stable             Mild elevated glucose-improved with weight loss. Plan-Recheck 6 months or before prn for Medicare Wellness Visit. Same meds. Declines flu vaccine. Check labs next visit. ADDENDUM:  Pt has noticed two facial lesions. I recommend dermatology eval and pt agrees. Confusion/Disorientation

## 2020-02-17 NOTE — ED PROVIDER NOTE - PMH
Cardiac disease  pacemaker 2007  Crohn disease    Depression    High cholesterol    Incontinence of urine in female    Pacemaker, artificial  For the last 10 years, was changed on April 2018  [facility]  Rheumatoid arthritis

## 2020-02-18 DIAGNOSIS — M06.9 RHEUMATOID ARTHRITIS, UNSPECIFIED: ICD-10-CM

## 2020-02-18 DIAGNOSIS — K50.919 CROHN'S DISEASE, UNSPECIFIED, WITH UNSPECIFIED COMPLICATIONS: ICD-10-CM

## 2020-02-18 DIAGNOSIS — F32.9 MAJOR DEPRESSIVE DISORDER, SINGLE EPISODE, UNSPECIFIED: ICD-10-CM

## 2020-02-18 DIAGNOSIS — T82.111A BREAKDOWN (MECHANICAL) OF CARDIAC PULSE GENERATOR (BATTERY), INITIAL ENCOUNTER: ICD-10-CM

## 2020-02-18 DIAGNOSIS — E78.00 PURE HYPERCHOLESTEROLEMIA, UNSPECIFIED: ICD-10-CM

## 2020-02-18 LAB
ANION GAP SERPL CALC-SCNC: 14 MMOL/L — SIGNIFICANT CHANGE UP (ref 5–17)
BLD GP AB SCN SERPL QL: SIGNIFICANT CHANGE UP
BUN SERPL-MCNC: 14 MG/DL — SIGNIFICANT CHANGE UP (ref 8–20)
CALCIUM SERPL-MCNC: 9.3 MG/DL — SIGNIFICANT CHANGE UP (ref 8.6–10.2)
CHLORIDE SERPL-SCNC: 99 MMOL/L — SIGNIFICANT CHANGE UP (ref 98–107)
CO2 SERPL-SCNC: 24 MMOL/L — SIGNIFICANT CHANGE UP (ref 22–29)
CREAT SERPL-MCNC: 0.74 MG/DL — SIGNIFICANT CHANGE UP (ref 0.5–1.3)
GLUCOSE SERPL-MCNC: 115 MG/DL — HIGH (ref 70–99)
HCT VFR BLD CALC: 43.4 % — SIGNIFICANT CHANGE UP (ref 34.5–45)
HGB BLD-MCNC: 13.5 G/DL — SIGNIFICANT CHANGE UP (ref 11.5–15.5)
MAGNESIUM SERPL-MCNC: 1.9 MG/DL — SIGNIFICANT CHANGE UP (ref 1.6–2.6)
MCHC RBC-ENTMCNC: 29 PG — SIGNIFICANT CHANGE UP (ref 27–34)
MCHC RBC-ENTMCNC: 31.1 GM/DL — LOW (ref 32–36)
MCV RBC AUTO: 93.1 FL — SIGNIFICANT CHANGE UP (ref 80–100)
PLATELET # BLD AUTO: 207 K/UL — SIGNIFICANT CHANGE UP (ref 150–400)
POTASSIUM SERPL-MCNC: 4 MMOL/L — SIGNIFICANT CHANGE UP (ref 3.5–5.3)
POTASSIUM SERPL-SCNC: 4 MMOL/L — SIGNIFICANT CHANGE UP (ref 3.5–5.3)
RBC # BLD: 4.66 M/UL — SIGNIFICANT CHANGE UP (ref 3.8–5.2)
RBC # FLD: 14.9 % — HIGH (ref 10.3–14.5)
SODIUM SERPL-SCNC: 137 MMOL/L — SIGNIFICANT CHANGE UP (ref 135–145)
WBC # BLD: 8.28 K/UL — SIGNIFICANT CHANGE UP (ref 3.8–10.5)
WBC # FLD AUTO: 8.28 K/UL — SIGNIFICANT CHANGE UP (ref 3.8–10.5)

## 2020-02-18 PROCEDURE — 93010 ELECTROCARDIOGRAM REPORT: CPT

## 2020-02-18 PROCEDURE — 99233 SBSQ HOSP IP/OBS HIGH 50: CPT

## 2020-02-18 RX ORDER — HYDRALAZINE HCL 50 MG
10 TABLET ORAL ONCE
Refills: 0 | Status: COMPLETED | OUTPATIENT
Start: 2020-02-18 | End: 2020-02-18

## 2020-02-18 RX ORDER — CEFAZOLIN SODIUM 1 G
2000 VIAL (EA) INJECTION
Refills: 0 | Status: COMPLETED | OUTPATIENT
Start: 2020-02-18 | End: 2020-02-19

## 2020-02-18 RX ADMIN — QUETIAPINE FUMARATE 25 MILLIGRAM(S): 200 TABLET, FILM COATED ORAL at 23:02

## 2020-02-18 RX ADMIN — SIMVASTATIN 20 MILLIGRAM(S): 20 TABLET, FILM COATED ORAL at 23:02

## 2020-02-18 RX ADMIN — Medication 100 MILLIGRAM(S): at 23:02

## 2020-02-18 RX ADMIN — SODIUM CHLORIDE 3 MILLILITER(S): 9 INJECTION INTRAMUSCULAR; INTRAVENOUS; SUBCUTANEOUS at 23:03

## 2020-02-18 RX ADMIN — Medication 5 MILLIGRAM(S): at 05:31

## 2020-02-18 RX ADMIN — AMLODIPINE BESYLATE 5 MILLIGRAM(S): 2.5 TABLET ORAL at 05:30

## 2020-02-18 RX ADMIN — Medication 5 MILLIGRAM(S): at 18:57

## 2020-02-18 RX ADMIN — Medication 10 MILLIGRAM(S): at 16:37

## 2020-02-18 RX ADMIN — SODIUM CHLORIDE 3 MILLILITER(S): 9 INJECTION INTRAMUSCULAR; INTRAVENOUS; SUBCUTANEOUS at 05:29

## 2020-02-18 RX ADMIN — Medication 400 MILLIGRAM(S): at 05:30

## 2020-02-18 RX ADMIN — Medication 400 MILLIGRAM(S): at 23:02

## 2020-02-18 NOTE — PROGRESS NOTE ADULT - SUBJECTIVE AND OBJECTIVE BOX
ELECTROPHYSIOLOGY BRIEF POST-OP NOTE    I have personally seen and examined the patient. I agree with the history and physical which I have reviewed and noted any changes below.     PRE-OP DIAGNOSIS: Impending RA and RV lead fracture    POST-OP DIAGNOSIS: same    PROCEDURE: Micra pacemaker implantation via right femoral vein    Physician: Evans Kaiser MD  Assistant: none     ESTIMATED BLOOD LOSS: nominal    ANESTHESIA TYPE:  [   ]General Anesthesia  [ x ]Sedation  [   ]Local/Regional    CONDITION:  [  ]Critical  [  ]Serious  [ x ]Stable  [  ]Good    SPECIMENS REMOVED (if applicable): none    IMPLANT (if applicable): Medtronic Micra AV leadless pacemaker    Programmed: VDD 50ppm    Old Medtronic pacemaker programmed ODO mode.     EKG: RV paced at 50ppm    Vital Signs Last 24 Hrs  T(C): 36.7 (18 Feb 2020 14:34), Max: 36.8 (18 Feb 2020 04:31)  T(F): 98.1 (18 Feb 2020 14:34), Max: 98.2 (18 Feb 2020 04:31)  HR: 50 (18 Feb 2020 16:02) (38 - 76)  BP: 194/82 (18 Feb 2020 16:02) (129/61 - 199/85)  BP(mean): 96 (17 Feb 2020 23:38) (96 - 96)  RR: 16 (18 Feb 2020 16:02) (16 - 25)  SpO2: 99% (18 Feb 2020 16:02) (94% - 99%)    Physical Exam:  Constitutional: NAD, AAOx2  Cardiovascular: +S1S2 RRR  Pulmonary: CTA b/l, unlabored  GI: soft NTND +BS  Extremities: no pedal edema,   Right Groin: No hematoma. 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 Patient now s/p Micra AV leadless pacemaker implantation via right femoral vein    -Ancef 2gram IV Q8hr x 2 more doses  -Chest X-ray PA and LAT in am to eval lead position  -NO LOVENOX OR HEPARIN INCLUDING SQ UNLESS CLEARED BY EP  -no lifting affected arm over shoulder x 6 weeks  -AM labs and EKG  -CXR in AM  -Medtronic check in AM  -Ok to resume beta blockers if clinically indicated.   -Will follow in AM

## 2020-02-18 NOTE — PROGRESS NOTE ADULT - SUBJECTIVE AND OBJECTIVE BOX
Internal Medicine Hospitalist Progress Note    follow up for PPM dysfunction , arythmia                       VITALS:  Vital Signs Last 24 Hrs  T(C): 36.8 (18 Feb 2020 04:31), Max: 36.9 (17 Feb 2020 15:09)  T(F): 98.2 (18 Feb 2020 04:31), Max: 98.4 (17 Feb 2020 15:09)  HR: 76 (18 Feb 2020 04:31) (35 - 76)  BP: 153/86 (18 Feb 2020 04:31) (129/61 - 199/85)  BP(mean): 96 (17 Feb 2020 23:38) (96 - 96)  RR: 20 (18 Feb 2020 04:31) (17 - 20)  SpO2: 98% (18 Feb 2020 04:31) (95% - 98%) Daily Height in cm: 170.18 (17 Feb 2020 15:09)    Daily   CAPILLARY BLOOD GLUCOSE      POCT Blood Glucose.: 101 mg/dL (17 Feb 2020 15:25)    I&O's Summary      PHYSICAL EXAM:      Constitutional:awake alert     Neck: supple       Respiratory: CTA bilateral     Cardiovascular: regular s1 /s2       Gastrointestinal: soft no tenderness , BS positive       Extremities: no pretibial edema           LABS:                        12.5   6.77  )-----------( 206      ( 17 Feb 2020 16:04 )             40.5     02-17    135  |  98  |  12.0  ----------------------------<  101<H>  4.8   |  28.0  |  0.59    Ca    9.2      17 Feb 2020 16:04  Phos  2.9     02-17  Mg     1.9     02-17    TPro  7.1  /  Alb  3.5  /  TBili  0.6  /  DBili  x   /  AST  53<H>  /  ALT  20  /  AlkPhos  68  02-17    PT/INR - ( 17 Feb 2020 16:04 )   PT: 12.8 sec;   INR: 1.13 ratio         PTT - ( 17 Feb 2020 16:04 )  PTT:30.0 sec    Radiology :    < from: Xray Chest 1 View-PORTABLE IMMEDIATE (02.17.20 @ 20:30) >      IMPRESSION:   No evidence of active chest disease.  Cardiac device wire leads are within right atrium and right ventricle.         < end of copied text > Internal Medicine Hospitalist Progress Note    follow up for PPM dysfunction , arythmia         feels well , no complaints reported , resting in the bed   chart  reviewed               VITALS:  Vital Signs Last 24 Hrs  T(C): 36.8 (18 Feb 2020 04:31), Max: 36.9 (17 Feb 2020 15:09)  T(F): 98.2 (18 Feb 2020 04:31), Max: 98.4 (17 Feb 2020 15:09)  HR: 76 (18 Feb 2020 04:31) (35 - 76)  BP: 153/86 (18 Feb 2020 04:31) (129/61 - 199/85)  BP(mean): 96 (17 Feb 2020 23:38) (96 - 96)  RR: 20 (18 Feb 2020 04:31) (17 - 20)  SpO2: 98% (18 Feb 2020 04:31) (95% - 98%) Daily Height in cm: 170.18 (17 Feb 2020 15:09)    Daily   CAPILLARY BLOOD GLUCOSE      POCT Blood Glucose.: 101 mg/dL (17 Feb 2020 15:25)    I&O's Summary      PHYSICAL EXAM:      Constitutional:awake alert     Neck: supple       Respiratory: CTA bilateral     Cardiovascular: regular s1 /s2       Gastrointestinal: soft no tenderness , BS positive       Extremities: no pretibial edema           LABS:                        12.5   6.77  )-----------( 206      ( 17 Feb 2020 16:04 )             40.5     02-17    135  |  98  |  12.0  ----------------------------<  101<H>  4.8   |  28.0  |  0.59    Ca    9.2      17 Feb 2020 16:04  Phos  2.9     02-17  Mg     1.9     02-17    TPro  7.1  /  Alb  3.5  /  TBili  0.6  /  DBili  x   /  AST  53<H>  /  ALT  20  /  AlkPhos  68  02-17    PT/INR - ( 17 Feb 2020 16:04 )   PT: 12.8 sec;   INR: 1.13 ratio         PTT - ( 17 Feb 2020 16:04 )  PTT:30.0 sec    Radiology :    < from: Xray Chest 1 View-PORTABLE IMMEDIATE (02.17.20 @ 20:30) >      IMPRESSION:   No evidence of active chest disease.  Cardiac device wire leads are within right atrium and right ventricle.         < end of copied text >

## 2020-02-18 NOTE — PROGRESS NOTE ADULT - SUBJECTIVE AND OBJECTIVE BOX
[facility]                                                    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  Pt mildly confused.  Weakness has been present for a few days, no clear progression of symptoms.  In ER found to be in high degree AVB with bradycardia.  She is known to be PPM dependent (99% V pacing at time of last interrogation).  She reports occasional dizziness, none recently. Denies chest pain, palpitations, and syncope.  Denies SOB, DAVIS, orthopnea, PND, and edema.  No fever/chills/cough.  Medtronic device interrogation suggests ventricular lead fracture some time in January.  Settings changed to maximize ventricular output, and no V pacing asynchronously at 75bpm.  No change in symptoms with increased HR.      Overnight events/Subjective Assessment: lethargy. Responds but goes back to sleep    INTERPRETATION OF TELEMETRY (personally reviewed): V paced 75    PAST MEDICAL & SURGICAL HISTORY:  Pacemaker, artificial: For the last 10 years, was changed on April 2018  [facility]  Incontinence of urine in female  Rheumatoid arthritis  Depression  Cardiac disease: pacemaker 2007  High cholesterol  Crohn disease  Pacemaker      sulfa drugs (Anaphylaxis)      MEDICATIONS  (STANDING):  amLODIPine   Tablet 5 milliGRAM(s) Oral daily  mesalamine DR Capsule 400 milliGRAM(s) Oral three times a day  predniSONE   Tablet 5 milliGRAM(s) Oral two times a day  QUEtiapine 25 milliGRAM(s) Oral at bedtime  simvastatin 20 milliGRAM(s) Oral at bedtime  sodium chloride 0.9% lock flush 3 milliLiter(s) IV Push every 8 hours    MEDICATIONS  (PRN):  ondansetron Injectable 4 milliGRAM(s) IV Push every 6 hours PRN Nausea      Vital Signs Last 24 Hrs  T(C): 36.8 (18 Feb 2020 04:31), Max: 36.9 (17 Feb 2020 15:09)  T(F): 98.2 (18 Feb 2020 04:31), Max: 98.4 (17 Feb 2020 15:09)  HR: 76 (18 Feb 2020 04:31) (35 - 76)  BP: 153/86 (18 Feb 2020 04:31) (129/61 - 199/85)  BP(mean): 96 (17 Feb 2020 23:38) (96 - 96)  RR: 20 (18 Feb 2020 04:31) (17 - 20)  SpO2: 98% (18 Feb 2020 04:31) (95% - 98%)    I&O's Detail    I&O's Summary          PHYSICAL EXAM:  General: Appears well developed, well nourished, no acute distress. not in acute pain  HEAD: normal cephalic. Atraumatic  PUPILS: equal and reactive to light  EARS: normal hearing  NECK: supple. no JVD or HJR. no carotid bruits. no visible lymphadenopathy  NOSE: no gross abnormalities  CHEST: symmetric chest wall expansion  CARDIOVASCULAR: Normal rate. Regular rhythm. Normal S1 and S2, no S3/S4,  no murmur, rub, or gallop  LUNGS: Normal effort. Normal respiratory rate. Breath sounds are clear to auscultation bilaterally. No respiratory distress. No stridor.  no rales, rhonchi or wheeze. no decreased Breath sounds  ABDOMEN: Soft, nontender, non-distended, positive bowel sounds, no mass or bruit. no abdominal tenderness. No rebound. no ascites  EXTREMITIES: No clubbing, cyanosis or edema. normal range of motion  PULSES:  distal pulses WNL  SKIN: Warm and dry with normal turgor. no visible rash or cyanosis   NEURO: Alert & oriented x 3, grossly intact with no focal weakness  PSYCH: normal mood and affect. Grossly normal insight and judgement exhibited    FAMILY HISTORY:  Mother MI in her 60's. No hx of CM or SCD. No family hx of ischemic heart disease for father or 1st degree relatvies      SOCIAL HISTORY: former smoking. No ETOH/No IVDA    REVIEW OF SYSTEMS:  Constitutional: no fever, chills or malaise. No weight loss  Head: no trauma  Eyes: no visual deficit. No double vision  Ears: no hearing deficit or ringing in the ears  Nose: no nose bleeds or smell changes or congestion  Throat: no difficult swallowing or painful swallowing  Neck: supple. No lymphadenopathy or swelling  Respiratory: no SOB, wheeze, asthma, COPD. No cough. No blood in the sputum  Cardiovascular: no CP, palpitations, irregular heart beats. No edema. No PND. No orthopnea. No skin/temperature or color changes  Gastrointestinal: no abdominal pain. No constipation. No diarrhea. No melena. No nausea. No vomiting. No bloating  Genitourinary: no frequency or urgency. No hematuria  Lymphatics: no grossly swollen lymph nodes  Musculoskeletal: no limitation of range of motion. Normal strength. No pain  Integumentary: no visible rash. No itching  Neurologic: no HA. No TIA or stroke symptoms. No seizure. No hx of epilepsy. No tingling or numbness. No weakness. No dizziness  Psychiatric: denied. Reports appropriate mood.        LABS:                        12.5   6.77  )-----------( 206      ( 17 Feb 2020 16:04 )             40.5     02-17    135  |  98  |  12.0  ----------------------------<  101<H>  4.8   |  28.0  |  0.59    Ca    9.2      17 Feb 2020 16:04  Phos  2.9     02-17  Mg     1.9     02-17    TPro  7.1  /  Alb  3.5  /  TBili  0.6  /  DBili  x   /  AST  53<H>  /  ALT  20  /  AlkPhos  68  02-17    CARDIAC MARKERS ( 17 Feb 2020 16:04 )  x     / <0.01 ng/mL / x     / x     / x          PT/INR - ( 17 Feb 2020 16:04 )   PT: 12.8 sec;   INR: 1.13 ratio         PTT - ( 17 Feb 2020 16:04 )  PTT:30.0 sec  serum  Lipids:     Thyroid Stimulating Hormone, Serum: 1.87 uIU/mL (02-17 @ 16:04)      RADIOLOGY & ADDITIONAL STUDIES:    ECG: bradycardia. No V pacing 2/17 15:19    < from: Xray Chest 1 View-PORTABLE IMMEDIATE (02.17.20 @ 20:30) >  IMPRESSION:   No evidence of active chest disease.  Cardiac device wire leads are within right atrium and right ventricle.     < from: CT Head No Cont (02.17.20 @ 19:35) >  Impression: No evidence of acute hemorrhage mass or mass effect.    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  She was found to be bradycardic with high degree AVB and evidence of RV lead fracture on Medtronic PPM interrogation.  Settings adjusted to maximize output with resultant ventricular capture.    - PM malfunction. RV lead fracture. Device interrogation suggests that lead fracture occurred some time in January.  With increased HR after setting adjustment, her symptoms did not change, suggesting that this is not an acute change. Now V pacing at 75 on telemetry monitor    - Current device setting is VOO at 75bpm, RV output 8V @ 1ms.     - ECHO: 10/2018 office EF 55-60%, moderate concentric LVH, impaired LV relaxation, mild AI/MR, device wire seen, inappropriate PPM spikes.  Echo from [facility] 10/2018 showed EF 45-50%.  Repeat echo pending    - Continue ASA 81 and Zocor 20 for now    - HTN.  BP mildly elevated.   Hydralazine 25mg PO PRN as needed. Avoid AV ciaran blocking agents for now.  Can give non-AV ciaran agents as needed for BP control    - NPO today for RV lead revision.  This was discussed with pt and she is agreeable.    - There is no indication for TVP at the current time.      - Can be admitted to telemetry.  Tele will show asynchronous V pacing at 75bpm.  An occasional PVC (+/- fusion beat) may be seen.    - Telemetry monitoring personally reviewed by me. HR 75. V paced    - ECG personally reviewed by me    - radiologic imaging reviewed    - Laboratory data reviewed.    - I have personally reviewed all obtainable prior records and data    - I personally d/w Dr. Kaiser of EP service    We will follow with you    Madisyn Reed MD

## 2020-02-18 NOTE — PROGRESS NOTE ADULT - ASSESSMENT
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  She was found to be bradycardic with high degree AVB and evidence of RV lead fracture on Medtronic PPM interrogation.  Settings adjusted to maximize output with resultant ventricular capture.    1- PPM lead dysfunction   need PPM lead replacement  by EP today   cont cardiac monitor     2- HTN - mildly elevated   cont home meds and hydralazine prn

## 2020-02-18 NOTE — PATIENT PROFILE ADULT - NSPROEDALEARNPREF_GEN_A_NUR
individual instruction/skill demonstration/audio/verbal instruction/written material/group instruction/pictorial/computer/internet/video verbal instruction/written material

## 2020-02-19 LAB
ANION GAP SERPL CALC-SCNC: 14 MMOL/L — SIGNIFICANT CHANGE UP (ref 5–17)
BUN SERPL-MCNC: 24 MG/DL — HIGH (ref 8–20)
CALCIUM SERPL-MCNC: 8.9 MG/DL — SIGNIFICANT CHANGE UP (ref 8.6–10.2)
CHLORIDE SERPL-SCNC: 99 MMOL/L — SIGNIFICANT CHANGE UP (ref 98–107)
CO2 SERPL-SCNC: 26 MMOL/L — SIGNIFICANT CHANGE UP (ref 22–29)
CREAT SERPL-MCNC: 1.04 MG/DL — SIGNIFICANT CHANGE UP (ref 0.5–1.3)
GLUCOSE SERPL-MCNC: 89 MG/DL — SIGNIFICANT CHANGE UP (ref 70–99)
HCT VFR BLD CALC: 38.7 % — SIGNIFICANT CHANGE UP (ref 34.5–45)
HGB BLD-MCNC: 11.7 G/DL — SIGNIFICANT CHANGE UP (ref 11.5–15.5)
MAGNESIUM SERPL-MCNC: 1.8 MG/DL — SIGNIFICANT CHANGE UP (ref 1.6–2.6)
MCHC RBC-ENTMCNC: 29.1 PG — SIGNIFICANT CHANGE UP (ref 27–34)
MCHC RBC-ENTMCNC: 30.2 GM/DL — LOW (ref 32–36)
MCV RBC AUTO: 96.3 FL — SIGNIFICANT CHANGE UP (ref 80–100)
PLATELET # BLD AUTO: 195 K/UL — SIGNIFICANT CHANGE UP (ref 150–400)
POTASSIUM SERPL-MCNC: 3.7 MMOL/L — SIGNIFICANT CHANGE UP (ref 3.5–5.3)
POTASSIUM SERPL-SCNC: 3.7 MMOL/L — SIGNIFICANT CHANGE UP (ref 3.5–5.3)
RBC # BLD: 4.02 M/UL — SIGNIFICANT CHANGE UP (ref 3.8–5.2)
RBC # FLD: 15.3 % — HIGH (ref 10.3–14.5)
SODIUM SERPL-SCNC: 139 MMOL/L — SIGNIFICANT CHANGE UP (ref 135–145)
WBC # BLD: 6.98 K/UL — SIGNIFICANT CHANGE UP (ref 3.8–10.5)
WBC # FLD AUTO: 6.98 K/UL — SIGNIFICANT CHANGE UP (ref 3.8–10.5)

## 2020-02-19 PROCEDURE — 93010 ELECTROCARDIOGRAM REPORT: CPT

## 2020-02-19 PROCEDURE — 71046 X-RAY EXAM CHEST 2 VIEWS: CPT | Mod: 26

## 2020-02-19 PROCEDURE — 99232 SBSQ HOSP IP/OBS MODERATE 35: CPT

## 2020-02-19 RX ORDER — MAGNESIUM OXIDE 400 MG ORAL TABLET 241.3 MG
400 TABLET ORAL
Refills: 0 | Status: COMPLETED | OUTPATIENT
Start: 2020-02-19 | End: 2020-02-20

## 2020-02-19 RX ORDER — AMLODIPINE BESYLATE 2.5 MG/1
10 TABLET ORAL DAILY
Refills: 0 | Status: DISCONTINUED | OUTPATIENT
Start: 2020-02-19 | End: 2020-02-20

## 2020-02-19 RX ORDER — MAGNESIUM SULFATE 500 MG/ML
2 VIAL (ML) INJECTION ONCE
Refills: 0 | Status: COMPLETED | OUTPATIENT
Start: 2020-02-19 | End: 2020-02-19

## 2020-02-19 RX ADMIN — Medication 50 GRAM(S): at 08:06

## 2020-02-19 RX ADMIN — Medication 400 MILLIGRAM(S): at 21:26

## 2020-02-19 RX ADMIN — SIMVASTATIN 20 MILLIGRAM(S): 20 TABLET, FILM COATED ORAL at 21:26

## 2020-02-19 RX ADMIN — Medication 5 MILLIGRAM(S): at 17:14

## 2020-02-19 RX ADMIN — Medication 400 MILLIGRAM(S): at 15:06

## 2020-02-19 RX ADMIN — SODIUM CHLORIDE 3 MILLILITER(S): 9 INJECTION INTRAMUSCULAR; INTRAVENOUS; SUBCUTANEOUS at 21:26

## 2020-02-19 RX ADMIN — QUETIAPINE FUMARATE 25 MILLIGRAM(S): 200 TABLET, FILM COATED ORAL at 21:26

## 2020-02-19 RX ADMIN — AMLODIPINE BESYLATE 5 MILLIGRAM(S): 2.5 TABLET ORAL at 05:23

## 2020-02-19 RX ADMIN — Medication 5 MILLIGRAM(S): at 05:23

## 2020-02-19 RX ADMIN — Medication 400 MILLIGRAM(S): at 08:06

## 2020-02-19 RX ADMIN — SODIUM CHLORIDE 3 MILLILITER(S): 9 INJECTION INTRAMUSCULAR; INTRAVENOUS; SUBCUTANEOUS at 05:19

## 2020-02-19 RX ADMIN — Medication 100 MILLIGRAM(S): at 06:50

## 2020-02-19 RX ADMIN — MAGNESIUM OXIDE 400 MG ORAL TABLET 400 MILLIGRAM(S): 241.3 TABLET ORAL at 12:55

## 2020-02-19 RX ADMIN — MAGNESIUM OXIDE 400 MG ORAL TABLET 400 MILLIGRAM(S): 241.3 TABLET ORAL at 17:14

## 2020-02-19 RX ADMIN — SODIUM CHLORIDE 3 MILLILITER(S): 9 INJECTION INTRAMUSCULAR; INTRAVENOUS; SUBCUTANEOUS at 12:10

## 2020-02-19 NOTE — PROGRESS NOTE ADULT - SUBJECTIVE AND OBJECTIVE BOX
[facility]                                                    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  Pt mildly confused.  Weakness has been present for a few days, no clear progression of symptoms.  In ER found to be in high degree AVB with bradycardia.  She is known to be PPM dependent (99% V pacing at time of last interrogation).  She reports occasional dizziness, none recently. Denies chest pain, palpitations, and syncope.  Denies SOB, DAVIS, orthopnea, PND, and edema.  No fever/chills/cough.  Medtronic device interrogation suggests ventricular lead fracture some time in January.  Settings changed to maximize ventricular output, and no V pacing asynchronously at 75bpm.  No change in symptoms with increased HR    Overnight events/Subjective Assessment:    INTERPRETATION OF TELEMETRY (personally reviewed):    PAST MEDICAL & SURGICAL HISTORY:  Pacemaker, artificial: For the last 10 years, was changed on April 2018  [facility]  Incontinence of urine in female  Rheumatoid arthritis  Depression  Cardiac disease: pacemaker 2007  High cholesterol  Crohn disease  Pacemaker      sulfa drugs (Anaphylaxis)      MEDICATIONS  (STANDING):  amLODIPine   Tablet 5 milliGRAM(s) Oral daily  mesalamine DR Capsule 400 milliGRAM(s) Oral three times a day  predniSONE   Tablet 5 milliGRAM(s) Oral two times a day  QUEtiapine 25 milliGRAM(s) Oral at bedtime  simvastatin 20 milliGRAM(s) Oral at bedtime  sodium chloride 0.9% lock flush 3 milliLiter(s) IV Push every 8 hours    MEDICATIONS  (PRN):  ondansetron Injectable 4 milliGRAM(s) IV Push every 6 hours PRN Nausea      Vital Signs Last 24 Hrs  T(C): 36.9 (19 Feb 2020 05:19), Max: 37 (18 Feb 2020 18:03)  T(F): 98.5 (19 Feb 2020 05:19), Max: 98.6 (18 Feb 2020 18:03)  HR: 84 (19 Feb 2020 05:19) (50 - 84)  BP: 144/60 (19 Feb 2020 05:19) (112/60 - 200/84)  BP(mean): --  RR: 16 (19 Feb 2020 05:19) (16 - 25)  SpO2: 94% (19 Feb 2020 05:19) (92% - 100%)    I&O's Detail    18 Feb 2020 07:01  -  19 Feb 2020 07:00  --------------------------------------------------------  IN:    Oral Fluid: 320 mL  Total IN: 320 mL    OUT:    Voided: 200 mL  Total OUT: 200 mL    Total NET: 120 mL        I&O's Summary    18 Feb 2020 07:01  -  19 Feb 2020 07:00  --------------------------------------------------------  IN: 320 mL / OUT: 200 mL / NET: 120 mL            PHYSICAL EXAM:  General: Appears well developed, well nourished, no acute distress. not in acute pain  HEAD: normal cephalic. Atraumatic  PUPILS: equal and reactive to light  EARS: normal hearing  NECK: supple. no JVD or HJR. no carotid bruits. no visible lymphadenopathy  NOSE: no gross abnormalities  CHEST: symmetric chest wall expansion  CARDIOVASCULAR: Normal rate. Regular rhythm. Normal S1 and S2, no S3/S4,  no murmur, rub, or gallop  LUNGS: Normal effort. Normal respiratory rate. Breath sounds are clear to auscultation bilaterally. No respiratory distress. No stridor.  no rales, rhonchi or wheeze. no decreased Breath sounds  ABDOMEN: Soft, nontender, non-distended, positive bowel sounds, no mass or bruit. no abdominal tenderness. No rebound. no ascites  EXTREMITIES: No clubbing, cyanosis or edema. normal range of motion  PULSES:  distal pulses WNL  SKIN: Warm and dry with normal turgor. no visible rash or cyanosis   NEURO: Alert & oriented x 3, grossly intact with no focal weakness  PSYCH: normal mood and affect. Grossly normal insight and judgement exhibited    FAMILY HISTORY:  Mother MI in her 60's. No hx of CM or SCD. No family hx of ischemic heart disease for father or 1st degree relatvies        SOCIAL HISTORY: former smoking. No ETOH/No IVDA    REVIEW OF SYSTEMS:  Constitutional: no fever, chills or malaise. No weight loss  Head: no trauma  Eyes: no visual deficit. No double vision  Ears: no hearing deficit or ringing in the ears  Nose: no nose bleeds or smell changes or congestion  Throat: no difficult swallowing or painful swallowing  Neck: supple. No lymphadenopathy or swelling  Respiratory: no SOB, wheeze, asthma, COPD. No cough. No blood in the sputum  Cardiovascular: no CP, palpitations, irregular heart beats. No edema. No PND. No orthopnea. No skin/temperature or color changes  Gastrointestinal: no abdominal pain. No constipation. No diarrhea. No melena. No nausea. No vomiting. No bloating  Genitourinary: no frequency or urgency. No hematuria  Lymphatics: no grossly swollen lymph nodes  Musculoskeletal: no limitation of range of motion. Normal strength. No pain  Integumentary: no visible rash. No itching  Neurologic: no HA. No TIA or stroke symptoms. No seizure. No hx of epilepsy. No tingling or numbness. No weakness. No dizziness  Psychiatric: denied. Reports appropriate mood.        LABS:                        11.7   6.98  )-----------( 195      ( 19 Feb 2020 07:01 )             38.7     02-18    137  |  99  |  14.0  ----------------------------<  115<H>  4.0   |  24.0  |  0.74    Ca    9.3      18 Feb 2020 11:43  Phos  2.9     02-17  Mg     1.9     02-18    TPro  7.1  /  Alb  3.5  /  TBili  0.6  /  DBili  x   /  AST  53<H>  /  ALT  20  /  AlkPhos  68  02-17    CARDIAC MARKERS ( 17 Feb 2020 16:04 )  x     / <0.01 ng/mL / x     / x     / x          PT/INR - ( 17 Feb 2020 16:04 )   PT: 12.8 sec;   INR: 1.13 ratio         PTT - ( 17 Feb 2020 16:04 )  PTT:30.0 sec  serum  Lipids:     Thyroid Stimulating Hormone, Serum: 1.87 uIU/mL (02-17 @ 16:04)      RADIOLOGY & ADDITIONAL STUDIES:      ECG: bradycardia. No V pacing 2/17 15:19    < from: Xray Chest 1 View-PORTABLE IMMEDIATE (02.17.20 @ 20:30) >  IMPRESSION:   No evidence of active chest disease.  Cardiac device wire leads are within right atrium and right ventricle.     < from: CT Head No Cont (02.17.20 @ 19:35) >  Impression: No evidence of acute hemorrhage mass or mass effect.    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  She was found to be bradycardic with high degree AVB and evidence of RV lead fracture on Medtronic PPM interrogation.  Settings adjusted to maximize output with resultant ventricular capture. Pt now s/p micra AV leadless PM 2/18/20.    - PM malfunction. RV lead fracture. Device interrogation suggests that lead fracture occurred some time in January. Now s/p micra AV leadless PM 2/18/20     - ECHO: 10/2018 office EF 55-60%, moderate concentric LVH, impaired LV relaxation, mild AI/MR, device wire seen, inappropriate PPM spikes.  Echo from [facility] 10/2018 showed EF 45-50%.  Repeat echo pending. If not done prior to DC, may be performed in the office setting    - Continue ASA 81 and Zocor 20 for now    - HTN.  BP is elevated.  now s/p PPM. Will increase amlodipine to 10mg daily.    - Hemodynamics stable.    - Telemetry monitoring personally reviewed by me.  V paced    - radiologic imaging reviewed    - Laboratory data reviewed.    - I have personally reviewed all obtainable prior records and data    - I personally d/w Dr. Kaiser of EP service    - May DC home from the cardiac standpoint with outpt fu with Dr Kaiser in one week        Madisyn Reed MD [facility]                                                    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  Pt mildly confused.  Weakness has been present for a few days, no clear progression of symptoms.  In ER found to be in high degree AVB with bradycardia.  She is known to be PPM dependent (99% V pacing at time of last interrogation).  She reports occasional dizziness, none recently. Denies chest pain, palpitations, and syncope.  Denies SOB, DAVIS, orthopnea, PND, and edema.  No fever/chills/cough.  Medtronic device interrogation suggests ventricular lead fracture some time in January.  Settings changed to maximize ventricular output, and no V pacing asynchronously at 75bpm.  No change in symptoms with increased HR    Overnight events/Subjective Assessment: more alert. no complaints    INTERPRETATION OF TELEMETRY (personally reviewed): atrial sensed V paced at 70. occasional PVC    PAST MEDICAL & SURGICAL HISTORY:  Pacemaker, artificial: For the last 10 years, was changed on April 2018  [facility]  Incontinence of urine in female  Rheumatoid arthritis  Depression  Cardiac disease: pacemaker 2007  High cholesterol  Crohn disease  Pacemaker      sulfa drugs (Anaphylaxis)      MEDICATIONS  (STANDING):  amLODIPine   Tablet 5 milliGRAM(s) Oral daily  mesalamine DR Capsule 400 milliGRAM(s) Oral three times a day  predniSONE   Tablet 5 milliGRAM(s) Oral two times a day  QUEtiapine 25 milliGRAM(s) Oral at bedtime  simvastatin 20 milliGRAM(s) Oral at bedtime  sodium chloride 0.9% lock flush 3 milliLiter(s) IV Push every 8 hours    MEDICATIONS  (PRN):  ondansetron Injectable 4 milliGRAM(s) IV Push every 6 hours PRN Nausea      Vital Signs Last 24 Hrs  T(C): 36.9 (19 Feb 2020 05:19), Max: 37 (18 Feb 2020 18:03)  T(F): 98.5 (19 Feb 2020 05:19), Max: 98.6 (18 Feb 2020 18:03)  HR: 84 (19 Feb 2020 05:19) (50 - 84)  BP: 144/60 (19 Feb 2020 05:19) (112/60 - 200/84)  BP(mean): --  RR: 16 (19 Feb 2020 05:19) (16 - 25)  SpO2: 94% (19 Feb 2020 05:19) (92% - 100%)    I&O's Detail    18 Feb 2020 07:01  -  19 Feb 2020 07:00  --------------------------------------------------------  IN:    Oral Fluid: 320 mL  Total IN: 320 mL    OUT:    Voided: 200 mL  Total OUT: 200 mL    Total NET: 120 mL        I&O's Summary    18 Feb 2020 07:01  -  19 Feb 2020 07:00  --------------------------------------------------------  IN: 320 mL / OUT: 200 mL / NET: 120 mL            PHYSICAL EXAM:  General: Appears well developed, well nourished, no acute distress. not in acute pain  HEAD: normal cephalic. Atraumatic  PUPILS: equal and reactive to light  EARS: normal hearing  NECK: supple. no JVD or HJR. no carotid bruits. no visible lymphadenopathy  NOSE: no gross abnormalities  CHEST: symmetric chest wall expansion  CARDIOVASCULAR: Normal rate. Regular rhythm. Normal S1 and S2, no S3/S4,  no murmur, rub, or gallop. left pacer site stable  LUNGS: Normal effort. Normal respiratory rate. Breath sounds are clear to auscultation bilaterally. No respiratory distress. No stridor.  no rales, rhonchi or wheeze. no decreased Breath sounds  ABDOMEN: Soft, nontender, non-distended, positive bowel sounds, no mass or bruit. no abdominal tenderness. No rebound. no ascites  EXTREMITIES: No clubbing, cyanosis or edema. normal range of motion  PULSES:  distal pulses WNL  SKIN: Warm and dry with normal turgor. no visible rash or cyanosis   NEURO: Alert & oriented x 3, grossly intact with no focal weakness  PSYCH: normal mood and affect. Grossly normal insight and judgement exhibited    FAMILY HISTORY:  Mother MI in her 60's. No hx of CM or SCD. No family hx of ischemic heart disease for father or 1st degree relatvies        SOCIAL HISTORY: former smoking. No ETOH/No IVDA    REVIEW OF SYSTEMS:  Constitutional: no fever, chills or malaise. No weight loss  Head: no trauma  Eyes: no visual deficit. No double vision  Ears: no hearing deficit or ringing in the ears  Nose: no nose bleeds or smell changes or congestion  Throat: no difficult swallowing or painful swallowing  Neck: supple. No lymphadenopathy or swelling  Respiratory: no SOB, wheeze, asthma, COPD. No cough. No blood in the sputum  Cardiovascular: no CP, palpitations, irregular heart beats. No edema. No PND. No orthopnea. No skin/temperature or color changes  Gastrointestinal: no abdominal pain. No constipation. No diarrhea. No melena. No nausea. No vomiting. No bloating  Genitourinary: no frequency or urgency. No hematuria  Lymphatics: no grossly swollen lymph nodes  Musculoskeletal: no limitation of range of motion. Normal strength. No pain  Integumentary: no visible rash. No itching  Neurologic: no HA. No TIA or stroke symptoms. No seizure. No hx of epilepsy. No tingling or numbness. No weakness. No dizziness  Psychiatric: denied. Reports appropriate mood.        LABS:                        11.7   6.98  )-----------( 195      ( 19 Feb 2020 07:01 )             38.7     02-18    137  |  99  |  14.0  ----------------------------<  115<H>  4.0   |  24.0  |  0.74    Ca    9.3      18 Feb 2020 11:43  Phos  2.9     02-17  Mg     1.9     02-18    TPro  7.1  /  Alb  3.5  /  TBili  0.6  /  DBili  x   /  AST  53<H>  /  ALT  20  /  AlkPhos  68  02-17    CARDIAC MARKERS ( 17 Feb 2020 16:04 )  x     / <0.01 ng/mL / x     / x     / x          PT/INR - ( 17 Feb 2020 16:04 )   PT: 12.8 sec;   INR: 1.13 ratio         PTT - ( 17 Feb 2020 16:04 )  PTT:30.0 sec  serum  Lipids:     Thyroid Stimulating Hormone, Serum: 1.87 uIU/mL (02-17 @ 16:04)      RADIOLOGY & ADDITIONAL STUDIES:      ECG: bradycardia. No V pacing 2/17 15:19    < from: Xray Chest 1 View-PORTABLE IMMEDIATE (02.17.20 @ 20:30) >  IMPRESSION:   No evidence of active chest disease.  Cardiac device wire leads are within right atrium and right ventricle.     < from: CT Head No Cont (02.17.20 @ 19:35) >  Impression: No evidence of acute hemorrhage mass or mass effect.    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  She was found to be bradycardic with high degree AVB and evidence of RV lead fracture on Medtronic PPM interrogation.  Settings adjusted to maximize output with resultant ventricular capture. Pt now s/p micra AV leadless PM 2/18/20.    - PM malfunction. RV lead fracture. Device interrogation suggests that lead fracture occurred January 25. Now s/p micra AV leadless PM 2/18/20     - I personally reviewed PM interrogation with PM rep at the bedside. Appropriately atrial sensed and ventricularly paced    - ECHO: 10/2018 office EF 55-60%, moderate concentric LVH, impaired LV relaxation, mild AI/MR, device wire seen, inappropriate PPM spikes.  Echo from [facility] 10/2018 showed EF 45-50%.  Repeat echo pending. If not done prior to DC, may be performed in the office setting    - Continue ASA 81 and Zocor 20 for now    - HTN.  BP is elevated.  now s/p PPM. Will increase amlodipine to 10mg daily.    - Hemodynamics stable.    - Telemetry monitoring personally reviewed by me. appropriate atrial sensed and V paced    - radiologic imaging reviewed    - Laboratory data reviewed.    - I have personally reviewed all obtainable prior records and data    - I personally d/w Dr. Kaiser of EP service    - May DC home from the cardiac standpoint with outpt fu with Dr Kaiser in one week        Madisyn Reed MD

## 2020-02-19 NOTE — PROGRESS NOTE ADULT - SUBJECTIVE AND OBJECTIVE BOX
Patient seen today in bed. Figure 8 suture removed from right groin without complication. Patient offers no complaints    EKG: AS RV paced at 74bpm  TELE: RV paced.     MEDICATIONS  (STANDING):  amLODIPine   Tablet 5 milliGRAM(s) Oral daily  mesalamine DR Capsule 400 milliGRAM(s) Oral three times a day  predniSONE   Tablet 5 milliGRAM(s) Oral two times a day  QUEtiapine 25 milliGRAM(s) Oral at bedtime  simvastatin 20 milliGRAM(s) Oral at bedtime  sodium chloride 0.9% lock flush 3 milliLiter(s) IV Push every 8 hours    MEDICATIONS  (PRN):  ondansetron Injectable 4 milliGRAM(s) IV Push every 6 hours PRN Nausea    Allergies  sulfa drugs (Anaphylaxis)    PAST MEDICAL & SURGICAL HISTORY:  Pacemaker, artificial: For the last 10 years, was changed on April 2018  [facility]  Incontinence of urine in female  Rheumatoid arthritis  Depression  Cardiac disease: pacemaker 2007  High cholesterol  Crohn disease  Pacemaker    Vital Signs Last 24 Hrs  T(C): 36.9 (19 Feb 2020 05:19), Max: 37 (18 Feb 2020 18:03)  T(F): 98.5 (19 Feb 2020 05:19), Max: 98.6 (18 Feb 2020 18:03)  HR: 84 (19 Feb 2020 05:19) (50 - 84)  BP: 144/60 (19 Feb 2020 05:19) (112/60 - 200/84)  RR: 16 (19 Feb 2020 05:19) (16 - 25)  SpO2: 94% (19 Feb 2020 05:19) (92% - 100%)    Physical Exam:  Right Groin: No hematoma      LABS:                        11.7   6.98  )-----------( 195      ( 19 Feb 2020 07:01 )             38.7     139  |  99  |  24.0<H>  ----------------------------<  89  3.7   |  26.0  |  1.04  Ca    8.9      19 Feb 2020 07:01  Phos  2.9     02-17  Mg     1.8     02-19  TPro  7.1  /  Alb  3.5  /  TBili  0.6  /  DBili  x   /  AST  53<H>  /  ALT  20  /  AlkPhos  68  02-17  PT/INR - ( 17 Feb 2020 16:04 )   PT: 12.8 sec;   INR: 1.13 ratio    PTT - ( 17 Feb 2020 16:04 )  PTT:30.0 sec    A/P  84 year old female with HTN, HLD, RA, 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 Patient now s/p Micra AV leadless pacemaker implantation via right femoral vein    -CXR pending  - Discharge ok from EP perspective once above performed.

## 2020-02-19 NOTE — PHYSICAL THERAPY INITIAL EVALUATION ADULT - ADDITIONAL COMMENTS
pt primary mode of locomotion at home was via W/C, pt was able to transfer in/out of W/C independently, ambulated was very limited and always required assist and a RW, 2 steps 1 rail to enter home, 2 steps 1 rail within home

## 2020-02-19 NOTE — PROGRESS NOTE ADULT - SUBJECTIVE AND OBJECTIVE BOX
Internal Medicine Hospitalist Progress Note    follow up for PPM dysfunction , arythmia     s/p PPM lead       VITALS:  Vital Signs Last 24 Hrs  T(C): 36.8 (18 Feb 2020 04:31), Max: 36.9 (17 Feb 2020 15:09)  T(F): 98.2 (18 Feb 2020 04:31), Max: 98.4 (17 Feb 2020 15:09)  HR: 76 (18 Feb 2020 04:31) (35 - 76)  BP: 153/86 (18 Feb 2020 04:31) (129/61 - 199/85)  BP(mean): 96 (17 Feb 2020 23:38) (96 - 96)  RR: 20 (18 Feb 2020 04:31) (17 - 20)  SpO2: 98% (18 Feb 2020 04:31) (95% - 98%) Daily Height in cm: 170.18 (17 Feb 2020 15:09)    Daily   CAPILLARY BLOOD GLUCOSE      POCT Blood Glucose.: 101 mg/dL (17 Feb 2020 15:25)    I&O's Summary      PHYSICAL EXAM:      Constitutional:awake alert     Neck: supple       Respiratory: CTA bilateral     Cardiovascular: regular s1 /s2       Gastrointestinal: soft no tenderness , BS positive       Extremities: no pretibial edema           LABS:                        12.5   6.77  )-----------( 206      ( 17 Feb 2020 16:04 )             40.5     02-17    135  |  98  |  12.0  ----------------------------<  101<H>  4.8   |  28.0  |  0.59    Ca    9.2      17 Feb 2020 16:04  Phos  2.9     02-17  Mg     1.9     02-17    TPro  7.1  /  Alb  3.5  /  TBili  0.6  /  DBili  x   /  AST  53<H>  /  ALT  20  /  AlkPhos  68  02-17    PT/INR - ( 17 Feb 2020 16:04 )   PT: 12.8 sec;   INR: 1.13 ratio         PTT - ( 17 Feb 2020 16:04 )  PTT:30.0 sec    Radiology :    < from: Xray Chest 1 View-PORTABLE IMMEDIATE (02.17.20 @ 20:30) >      IMPRESSION:   No evidence of active chest disease.  Cardiac device wire leads are within right atrium and right ventricle.         < end of copied text >

## 2020-02-19 NOTE — PHYSICAL THERAPY INITIAL EVALUATION ADULT - CRITERIA FOR SKILLED THERAPEUTIC INTERVENTIONS
rehab potential/predicted duration of therapy intervention/functional limitations in following categories/impairments found/therapy frequency/anticipated discharge recommendation

## 2020-02-19 NOTE — PROGRESS NOTE ADULT - ASSESSMENT
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  She was found to be bradycardic with high degree AVB and evidence of RV lead fracture on Medtronic PPM interrogation.  Settings adjusted to maximize output with resultant ventricular capture.Now s/p micra AV leadless PM 2/18/20       1- PPM lead dysfunction   s/p micra leadless PM insertion   cont cardiac monitor   discharge to [facility]   Pt evaluation noted     2- HTN -stable   cont home meds and hydralazine prn

## 2020-02-19 NOTE — PHYSICAL THERAPY INITIAL EVALUATION ADULT - PERTINENT HX OF CURRENT PROBLEM, REHAB EVAL
pt presents to [facility] due to bradycardia, 3degree heart block, AMS, s/p micra AV leadless PPM implant 2/18

## 2020-02-20 ENCOUNTER — TRANSCRIPTION ENCOUNTER (OUTPATIENT)
Age: 85
End: 2020-02-20

## 2020-02-20 VITALS — SYSTOLIC BLOOD PRESSURE: 170 MMHG | DIASTOLIC BLOOD PRESSURE: 74 MMHG | HEART RATE: 84 BPM

## 2020-02-20 PROCEDURE — 99239 HOSP IP/OBS DSCHRG MGMT >30: CPT

## 2020-02-20 PROCEDURE — 71046 X-RAY EXAM CHEST 2 VIEWS: CPT

## 2020-02-20 PROCEDURE — 85730 THROMBOPLASTIN TIME PARTIAL: CPT

## 2020-02-20 PROCEDURE — 80053 COMPREHEN METABOLIC PANEL: CPT

## 2020-02-20 PROCEDURE — 85610 PROTHROMBIN TIME: CPT

## 2020-02-20 PROCEDURE — 86850 RBC ANTIBODY SCREEN: CPT

## 2020-02-20 PROCEDURE — 82962 GLUCOSE BLOOD TEST: CPT

## 2020-02-20 PROCEDURE — 36415 COLL VENOUS BLD VENIPUNCTURE: CPT

## 2020-02-20 PROCEDURE — 86900 BLOOD TYPING SEROLOGIC ABO: CPT

## 2020-02-20 PROCEDURE — 80048 BASIC METABOLIC PNL TOTAL CA: CPT

## 2020-02-20 PROCEDURE — 86901 BLOOD TYPING SEROLOGIC RH(D): CPT

## 2020-02-20 PROCEDURE — 71045 X-RAY EXAM CHEST 1 VIEW: CPT

## 2020-02-20 PROCEDURE — 93005 ELECTROCARDIOGRAM TRACING: CPT

## 2020-02-20 PROCEDURE — 84484 ASSAY OF TROPONIN QUANT: CPT

## 2020-02-20 PROCEDURE — 86923 COMPATIBILITY TEST ELECTRIC: CPT

## 2020-02-20 PROCEDURE — C1769: CPT

## 2020-02-20 PROCEDURE — 84100 ASSAY OF PHOSPHORUS: CPT

## 2020-02-20 PROCEDURE — 85027 COMPLETE CBC AUTOMATED: CPT

## 2020-02-20 PROCEDURE — 70450 CT HEAD/BRAIN W/O DYE: CPT

## 2020-02-20 PROCEDURE — 99285 EMERGENCY DEPT VISIT HI MDM: CPT | Mod: 25

## 2020-02-20 PROCEDURE — 97163 PT EVAL HIGH COMPLEX 45 MIN: CPT

## 2020-02-20 PROCEDURE — C1786: CPT

## 2020-02-20 PROCEDURE — 84443 ASSAY THYROID STIM HORMONE: CPT

## 2020-02-20 PROCEDURE — 83735 ASSAY OF MAGNESIUM: CPT

## 2020-02-20 PROCEDURE — 84436 ASSAY OF TOTAL THYROXINE: CPT

## 2020-02-20 RX ORDER — LOSARTAN POTASSIUM 100 MG/1
1 TABLET, FILM COATED ORAL
Qty: 0 | Refills: 0 | DISCHARGE
Start: 2020-02-20

## 2020-02-20 RX ORDER — AMLODIPINE BESYLATE 2.5 MG/1
1 TABLET ORAL
Qty: 0 | Refills: 0 | DISCHARGE
Start: 2020-02-20

## 2020-02-20 RX ORDER — AMLODIPINE BESYLATE 2.5 MG/1
5 TABLET ORAL ONCE
Refills: 0 | Status: COMPLETED | OUTPATIENT
Start: 2020-02-20 | End: 2020-02-20

## 2020-02-20 RX ORDER — LOSARTAN POTASSIUM 100 MG/1
25 TABLET, FILM COATED ORAL DAILY
Refills: 0 | Status: DISCONTINUED | OUTPATIENT
Start: 2020-02-20 | End: 2020-02-20

## 2020-02-20 RX ADMIN — Medication 5 MILLIGRAM(S): at 18:19

## 2020-02-20 RX ADMIN — AMLODIPINE BESYLATE 5 MILLIGRAM(S): 2.5 TABLET ORAL at 18:19

## 2020-02-20 RX ADMIN — Medication 5 MILLIGRAM(S): at 05:37

## 2020-02-20 RX ADMIN — SODIUM CHLORIDE 3 MILLILITER(S): 9 INJECTION INTRAMUSCULAR; INTRAVENOUS; SUBCUTANEOUS at 05:28

## 2020-02-20 RX ADMIN — LOSARTAN POTASSIUM 25 MILLIGRAM(S): 100 TABLET, FILM COATED ORAL at 16:26

## 2020-02-20 RX ADMIN — AMLODIPINE BESYLATE 10 MILLIGRAM(S): 2.5 TABLET ORAL at 05:37

## 2020-02-20 RX ADMIN — MAGNESIUM OXIDE 400 MG ORAL TABLET 400 MILLIGRAM(S): 241.3 TABLET ORAL at 13:32

## 2020-02-20 NOTE — PROGRESS NOTE ADULT - SUBJECTIVE AND OBJECTIVE BOX
[facility]                                                    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  Pt mildly confused.  Weakness has been present for a few days, no clear progression of symptoms.  In ER found to be in high degree AVB with bradycardia.  She is known to be PPM dependent (99% V pacing at time of last interrogation).  She reports occasional dizziness, none recently. Denies chest pain, palpitations, and syncope.  Denies SOB, DAVIS, orthopnea, PND, and edema.  No fever/chills/cough.  Medtronic device interrogation suggests ventricular lead fracture some time in January.  Settings changed to maximize ventricular output, and no V pacing asynchronously at 75bpm.  No change in symptoms with increased HR    Overnight events/Subjective Assessment: more alert. no complaints    PAST MEDICAL & SURGICAL HISTORY:  Pacemaker, artificial: For the last 10 years, was changed on April 2018  [facility]  Incontinence of urine in female  Rheumatoid arthritis  Depression  Cardiac disease: pacemaker 2007  High cholesterol  Crohn disease  Pacemaker      sulfa drugs (Anaphylaxis)      MEDICATIONS  (STANDING):  amLODIPine   Tablet 10 milliGRAM(s) Oral daily  magnesium oxide 400 milliGRAM(s) Oral three times a day with meals  mesalamine DR Capsule 400 milliGRAM(s) Oral three times a day  predniSONE   Tablet 5 milliGRAM(s) Oral two times a day  QUEtiapine 25 milliGRAM(s) Oral at bedtime  simvastatin 20 milliGRAM(s) Oral at bedtime  sodium chloride 0.9% lock flush 3 milliLiter(s) IV Push every 8 hours    MEDICATIONS  (PRN):  ondansetron Injectable 4 milliGRAM(s) IV Push every 6 hours PRN Nausea      Vital Signs Last 24 Hrs  T(C): 36.6 (20 Feb 2020 07:54), Max: 36.8 (19 Feb 2020 15:01)  T(F): 97.9 (20 Feb 2020 07:54), Max: 98.2 (19 Feb 2020 15:01)  HR: 76 (20 Feb 2020 07:54) (72 - 80)  BP: 167/74 (20 Feb 2020 07:54) (129/58 - 177/73)  BP(mean): --  RR: 17 (20 Feb 2020 07:54) (16 - 18)  SpO2: 98% (20 Feb 2020 07:54) (93% - 98%)    I&O's Detail    19 Feb 2020 07:01  -  20 Feb 2020 07:00  --------------------------------------------------------  IN:    Oral Fluid: 720 mL    Solution: 50 mL  Total IN: 770 mL    OUT:    Voided: 350 mL  Total OUT: 350 mL    Total NET: 420 mL        I&O's Summary    19 Feb 2020 07:01  -  20 Feb 2020 07:00  --------------------------------------------------------  IN: 770 mL / OUT: 350 mL / NET: 420 mL            PHYSICAL EXAM:  General: Appears well developed, well nourished, no acute distress. not in acute pain  HEAD: normal cephalic. Atraumatic  PUPILS: equal and reactive to light  EARS: normal hearing  NECK: supple. no JVD or HJR. no carotid bruits. no visible lymphadenopathy  NOSE: no gross abnormalities  CHEST: symmetric chest wall expansion  CARDIOVASCULAR: Normal rate. Regular rhythm. Normal S1 and S2, no S3/S4,  no murmur, rub, or gallop  LUNGS: Normal effort. Normal respiratory rate. Breath sounds are clear to auscultation bilaterally. No respiratory distress. No stridor.  no rales, rhonchi or wheeze. no decreased Breath sounds  ABDOMEN: Soft, nontender, non-distended, positive bowel sounds, no mass or bruit. no abdominal tenderness. No rebound. no ascites  EXTREMITIES: No clubbing, cyanosis or edema. normal range of motion  PULSES:  distal pulses WNL  SKIN: Warm and dry with normal turgor. no visible rash or cyanosis   NEURO: Alert & oriented x 3, grossly intact with no focal weakness  PSYCH: normal mood and affect. Grossly normal insight and judgement exhibited    FAMILY HISTORY:  Mother MI in her 60's. No hx of CM or SCD. No family hx of ischemic heart disease for father or 1st degree relatives      SOCIAL HISTORY: former smoking. No ETOH/No IVDA    REVIEW OF SYSTEMS:  Constitutional: no fever, chills or malaise. No weight loss  Head: no trauma  Eyes: no visual deficit. No double vision  Ears: no hearing deficit or ringing in the ears  Nose: no nose bleeds or smell changes or congestion  Throat: no difficult swallowing or painful swallowing  Neck: supple. No lymphadenopathy or swelling  Respiratory: no SOB, wheeze, asthma, COPD. No cough. No blood in the sputum  Cardiovascular: no CP, palpitations, irregular heart beats. No edema. No PND. No orthopnea. No skin/temperature or color changes  Gastrointestinal: no abdominal pain. No constipation. No diarrhea. No melena. No nausea. No vomiting. No bloating  Genitourinary: no frequency or urgency. No hematuria  Lymphatics: no grossly swollen lymph nodes  Musculoskeletal: no limitation of range of motion. Normal strength. No pain  Integumentary: no visible rash. No itching  Neurologic: no HA. No TIA or stroke symptoms. No seizure. No hx of epilepsy. No tingling or numbness. No weakness. No dizziness  Psychiatric: denied. Reports appropriate mood.        LABS:                        11.7   6.98  )-----------( 195      ( 19 Feb 2020 07:01 )             38.7     02-19    139  |  99  |  24.0<H>  ----------------------------<  89  3.7   |  26.0  |  1.04    Ca    8.9      19 Feb 2020 07:01  Mg     1.8     02-19            serum  Lipids:     Thyroid Stimulating Hormone, Serum: 1.87 uIU/mL (02-17 @ 16:04)      RADIOLOGY & ADDITIONAL STUDIES:  < from: Xray Chest 1 View-PORTABLE IMMEDIATE (02.17.20 @ 20:30) >  IMPRESSION:   No evidence of active chest disease.  Cardiac device wire leads are within right atrium and right ventricle.     < from: CT Head No Cont (02.17.20 @ 19:35) >  Impression: No evidence of acute hemorrhage mass or mass effect.    < end of copied text >    < end of copied text >    2/18/20  PROCEDURE: Micra pacemaker implantation via right femoral vein    < from: Xray Chest 2 Views PA/Lat (02.19.20 @ 12:23) >  Findings:    Cardiac size appears normal. Aorta within normal limits. Lungs appear normal. No acute pulmonary process seen. CP angles appear normal. No pneumothorax. Bones within normal limits. Left subclavian pacemaker. Intracardiac electronic device.    Impression:    No radiologically active cardiopulmonary process seen.    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  She was found to be bradycardic with high degree AVB and evidence of RV lead fracture on Medtronic PPM interrogation.  Settings adjusted to maximize output with resultant ventricular capture. Pt now s/p micra AV leadless PM 2/18/20.    - PM malfunction. RV lead fracture. Device interrogation suggests that lead fracture occurred January 25. Now s/p micra AV leadless PM 2/18/20.  Appropriately atrial sensed and V paced    - I personally reviewed PM interrogation with PM rep at the bedside 2/19. Normal PPM function    - ECHO: 10/2018 office EF 55-60%, moderate concentric LVH, impaired LV relaxation, mild AI/MR, device wire seen, inappropriate PPM spikes.  Echo from [facility] 10/2018 showed EF 45-50%.  Repeat echo pending. If not done prior to DC, may be performed in the office setting    - Continue ASA 81 and Zocor 20 for now    - HTN.  BP is elevated.  now s/p PPM. amlodipine increased to 10mg daily. Add losartan 25mg daily for better BP control.    - Hemodynamics stable.    - Telemetry monitoring personally reviewed by me.    - radiologic imaging reviewed    - Laboratory data reviewed.    - I have personally reviewed all obtainable prior records and data    - Pt will follow up with Dr. Kaiser of EP service on DC in one week    - Plan to DC to [facility].     - Please reconsult if any new CV issues should arise    Thank you for allowing me to participate in the care of your patient    Madisyn Reed MD

## 2020-02-20 NOTE — DISCHARGE NOTE PROVIDER - HOSPITAL COURSE
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  She was found to be bradycardic with high degree AVB and evidence of RV lead fracture on Medtronic PPM interrogation.  Settings were  adjusted to maximize output with resultant ventricular capture. Pt now s/p micra AV leadless PM 2/18/20.    Device interrogation suggests that lead fracture occurred January 25. Now s/p micra AV leadless PM 2/18/20.  Appropriately atrial sensed and V paced.    Patient followed by cardiology and EP. Had ECHO: 10/2018 office EF 55-60%, moderate concentric LVH, impaired LV relaxation, mild AI/MR, device wire seen, inappropriate PPM spikes. Echo from [facility] 10/2018 showed EF 45-50%. Was going to have repeat TTE here but as per cardiology, if not done prior to DC, may be performed in the office setting.    Patient will continue ASA 81 and Zocor 20. Pt had elevated BP, Amlodipine increased to 10mg daily. Added losartan 25mg daily for better BP control.    Pt will follow up with Dr. Kaiser of EP service on DC in one week    Plan to DC to [facility] today.         Vital Signs Last 24 Hrs    T(C): 36.6 (20 Feb 2020 15:20), Max: 36.8 (19 Feb 2020 21:23)    T(F): 97.9 (20 Feb 2020 15:20), Max: 98.2 (19 Feb 2020 21:23)    HR: 84 (20 Feb 2020 16:02) (72 - 87)    BP: 170/74 (20 Feb 2020 16:02) (145/64 - 177/73)    BP(mean): --    RR: 18 (20 Feb 2020 15:20) (16 - 18)    SpO2: 98% (20 Feb 2020 15:20) (98% - 98%)        General: Appears well developed, well nourished, no acute distress.     HEENT: NC/AT    NECK: supple.     CHEST: symmetric chest wall expansion    CARDIOVASCULAR: Normal rate. Regular rhythm. Normal S1 and S2, no S3/S4,  no murmur, rub, or gallop    LUNGS: Normal effort. Normal respiratory rate. CTA b/l. No rales, rhonchi or wheeze    ABDOMEN: NT/ND    EXTREMITIES: No clubbing, cyanosis or edema. normal range of motion    SKIN: Warm and dry with normal turgor. 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  She was found to be bradycardic with high degree AVB and evidence of RV lead fracture on Medtronic PPM interrogation.  Settings were  adjusted to maximize output with resultant ventricular capture. Pt now s/p micra AV leadless PM 2/18/20.    Device interrogation suggests that lead fracture occurred January 25. Now s/p micra AV leadless PM 2/18/20.  Appropriately atrial sensed and V paced.    Patient followed by cardiology and EP. Had ECHO: 10/2018 office EF 55-60%, moderate concentric LVH, impaired LV relaxation, mild AI/MR, device wire seen, inappropriate PPM spikes. Echo from [facility] 10/2018 showed EF 45-50%. Was going to have repeat TTE here but as per cardiology, if not done prior to DC, may be performed in the office setting.    Patient will continue ASA 81 and Zocor 20. Pt had elevated BP, Amlodipine increased to 10mg daily. Added losartan 25mg daily for better BP control.    Pt will follow up with Dr. Kaiser of EP service on DC in one week    Plan to DC to [facility] today.     she is feeling better . no complaints ,  at the bedside         Vital Signs Last 24 Hrs    T(C): 36.6 (20 Feb 2020 15:20), Max: 36.8 (19 Feb 2020 21:23)    T(F): 97.9 (20 Feb 2020 15:20), Max: 98.2 (19 Feb 2020 21:23)    HR: 84 (20 Feb 2020 16:02) (72 - 87)    BP: 170/74 (20 Feb 2020 16:02) (145/64 - 177/73)    BP(mean): --    RR: 18 (20 Feb 2020 15:20) (16 - 18)    SpO2: 98% (20 Feb 2020 15:20) (98% - 98%)        General: Appears well developed, well nourished, no acute distress.     HEENT: NC/AT    NECK: supple.     CARDIOVASCULAR: Normal rate. Regular rhythm. Normal S1 and S2, no S3/S4,  no murmur, rub, or gallop    LUNGS: Normal effort. Normal respiratory rate. CTA b/l. No rales, rhonchi or wheeze    ABDOMEN: NT/ND    EXTREMITIES: No clubbing, cyanosis or edema. normal range of motion    SKIN: Warm and dry with normal turgor. no visible rash or cyanosis     PSYCH: normal mood and affect         45 minutes spent on discharge summary and plan

## 2020-02-20 NOTE — DISCHARGE NOTE PROVIDER - NSDCCPCAREPLAN_GEN_ALL_CORE_FT
PRINCIPAL DISCHARGE DIAGNOSIS  Diagnosis: Pacemaker malfunction, initial encounter  Assessment and Plan of Treatment: PM replaced- functioning well. Follow up w electrophysiologist within 1 week      SECONDARY DISCHARGE DIAGNOSES  Diagnosis: Hypertension  Assessment and Plan of Treatment: You have one new medication and one at increased dose due to elevated pressure. Take as directed, f/u cardiology for further management.    Diagnosis: Altered mental status, unspecified altered mental status type  Assessment and Plan of Treatment: Resolved- secondary to heartblock due to above    Diagnosis: Third degree heart block  Assessment and Plan of Treatment: Plan as above- follow up with cardiology within 1 week.    Diagnosis: Symptomatic bradycardia  Assessment and Plan of Treatment: PRINCIPAL DISCHARGE DIAGNOSIS  Diagnosis: Pacemaker malfunction, initial encounter  Assessment and Plan of Treatment: PM replaced- functioning well. Follow up w electrophysiologist within 1 week      SECONDARY DISCHARGE DIAGNOSES  Diagnosis: Hypertension  Assessment and Plan of Treatment: You have one new medication and one at increased dose due to elevated pressure. Take as directed, f/u cardiology for further management.    Diagnosis: Third degree heart block  Assessment and Plan of Treatment: Plan as above- follow up with cardiology within 1 week.    Diagnosis: Symptomatic bradycardia  Assessment and Plan of Treatment: stable after PPM replacement

## 2020-02-20 NOTE — PROGRESS NOTE ADULT - REASON FOR ADMISSION
PPM lead fracture  AVB

## 2020-02-20 NOTE — DISCHARGE NOTE NURSING/CASE MANAGEMENT/SOCIAL WORK - PATIENT PORTAL LINK FT
You can access the FollowMyHealth Patient Portal offered by [facility] by registering at the following website: [email or web address]. By joining [employer]’s FollowMyHealth portal, you will also be able to view your health information using other applications (apps) compatible with our system.

## 2020-02-20 NOTE — DISCHARGE NOTE PROVIDER - CARE PROVIDER_API CALL
Madisyn eRed)  Cardiovascular Disease; Internal Medicine  [address], [address]  [place] [address]  Phone: [phone]  Fax: [phone]  Follow Up Time:

## 2020-02-20 NOTE — DISCHARGE NOTE PROVIDER - NSDCMRMEDTOKEN_GEN_ALL_CORE_FT
amLODIPine 10 mg oral tablet: 1 tab(s) orally once a day  DULoxetine 20 mg oral delayed release capsule: 1 cap(s) orally once a day  losartan 25 mg oral tablet: 1 tab(s) orally once a day  meclizine 25 mg oral tablet: 1 tab(s) orally every 8 hours  mesalamine 400 mg oral delayed release capsule: 1 cap(s) orally 3 times a day  Myrbetriq 25 mg oral tablet, extended release: 1 tab(s) orally once a day  pantoprazole 40 mg oral delayed release tablet: 1 tab(s) orally once a day (before a meal)  predniSONE 5 mg oral tablet: 1 tab(s) orally 2 times a day  QUEtiapine 25 mg oral tablet: 1 tab(s) orally once a day (at bedtime)  simvastatin 20 mg oral tablet: 1 tab(s) orally once a day (at bedtime)  Xeljanz:

## 2020-09-17 ENCOUNTER — APPOINTMENT (OUTPATIENT)
Dept: DERMATOLOGY | Facility: CLINIC | Age: 85
End: 2020-09-17

## 2020-12-17 NOTE — ED ADULT TRIAGE NOTE - DIRECT TO ROOM CARE INITIATED:
26-Oct-2018 15:30 MD MUSHTAQ Urias Anna Md ~ Card [facility] Clinical Support Pool  Results are discussed

## 2020-12-28 NOTE — PROVIDER CONTACT NOTE (OTHER) - BACKGROUND
Health Maintenance Due   Topic Date Due    HIV Screening  05/31/1978    Shingles Vaccine (1 of 2) 05/31/2014    Colorectal Cancer Screening  01/06/2020     Scanned in and hyperlinked outside colonoscopy results into .  Chart review completed.      
Pt with complete heart block, 100% pacer dependent. Medtronic interrogated the pacer 2/17, discovered ventricular wire fx. Pacer maximized output and set to 75 BPM.

## 2020-12-29 NOTE — ED PROVIDER NOTE - ST/T WAVE
I was present for and supervised the key and critical aspects of the procedures performed during the care of the patient.
I was present for and supervised the key and critical aspects of the procedures performed during the care of the patient.
402/[id number]

## 2021-02-25 NOTE — PHYSICAL THERAPY INITIAL EVALUATION ADULT - PERTINENT HX OF CURRENT PROBLEM, REHAB EVAL
pt presents to [facility] due to chest pain, pleural effusion, s/p thoracentesis 6/19 Unknown

## 2021-04-13 NOTE — H&P ADULT - PROBLEM SELECTOR PROBLEM 3
No retinal holes, tears, or detachment at this time. Crohn's disease with complication, unspecified gastrointestinal tract location

## 2021-06-05 NOTE — ED ADULT NURSE NOTE - IN THE PAST YEAR, HOW OFTEN HAVE YOU USED TOBACCO PRODUCTS?
Refill Request  Did you contact pharmacy: No  Medication name:   levoFLOXacin (LEVAQUIN) 750 MG tablet   Who prescribed the medication: PCP  Requested Pharmacy: [facility] [address] in [place].  Is patient out of medication: Yes  Patient notified refills processed in 3 business days:  yes  Okay to leave a detailed message: yes      Patient had to reschedule their dental appointments that they had scheduled to address their infection in the mouth that was being treated with this medication due to a misunderstanding with the patient's insurance company, and is in need of a refill of this medication until the patient can be seen the dentist.   Never

## 2021-10-05 NOTE — ED ADULT NURSE NOTE - PERIPHERAL VASCULAR
Airway  Performed by: Mindi Prince CRNA  Authorized by: Aladino de Ranieri, MD     Final Airway Type:  Supraglottic airway  Attempts*:  1  Ventilation Between Attempts:  Bag valve  Number of Other Approaches Attempted:  0   Patient Identified, Procedure confirmed, Emergency equipment available and Safety protocols followed  Location:  OR  Urgency:  Elective  Difficult Airway: No    Indications for Airway Management:  Anesthesia  Spontaneous Ventilation: present    Sedation Level:  Anesthetized  Mask Difficulty Assessment:  0 - not attempted  Performed By:  Anesthesiologist and CRNA  Anesthesiologist:  Aladino de Ranieri, MD  CRNA:  Mindi Prince CRNA        
WDL

## 2022-10-17 NOTE — DISCHARGE NOTE ADULT - CARE PROVIDERS DIRECT ADDRESSES
,DirectAddress_Unknown Purse String (Intermediate) Text: Given the location of the defect and the characteristics of the surrounding skin a purse string intermediate closure was deemed most appropriate.  Undermining was performed circumferentially around the surgical defect.  A purse string suture was then placed and tightened.

## 2024-03-14 NOTE — PROGRESS NOTE BEHAVIORAL HEALTH - HYGIENE
Lower than optimal vitamin B12 level  A prescription for vitamin B12 has been sent to the pharmacy, this could cause some darkening of the urine, we will discuss further at the next office appointment
Fair
Poor

## 2024-03-28 NOTE — ED PROVIDER NOTE - PSYCHIATRIC NEGATIVE STATEMENT, MLM
[facility] GERIATRICS  ACUTE/EPISODIC VISIT    [facility] Medical Record Number: [id number]  Place of Service where encounter took place: [facility] [place] ([facility]) [[id number]]    Chief Complaint   Patient presents with    RECHECK     HPI:    Elly Corrales is a 68 year old (1955), who is being seen today for an episodic care visit. HPI information obtained from: facility chart records, facility staff, patient report, and [facility] chart review.    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 She continues on IV abx. Seen today as she reports swelling and tenderness in right thigh. Reports started about 2-3 days ago. Did have similar incident about 2 weeks ago, negative for DVT at that time. She is able to walk on it, but it is a little sore. Breathing feels at baseline. She does continue to smoke. She has multiple scars on legs from previous angiograms and other procedures.     ALLERGIES:   Allergies   Allergen Reactions    Cephalexin Shortness Of Breath and Rash     And chest pain    Gabapentin Dizziness     Increased sedation even at 100 mg TID    Penicillin G Hives and Rash    Penicillins Hives and Rash      MEDICATIONS:  Post Discharge Medication Reconciliation Status: medication reconcilation previously completed during another office visit.     Current Outpatient Medications   Medication Sig Dispense Refill    acetaminophen (TYLENOL) 500 MG tablet Take 1,000 mg by mouth 4 times daily      albuterol (PROAIR HFA/PROVENTIL HFA/VENTOLIN HFA) 108 (90 Base) MCG/ACT inhaler Inhale 2 puffs into the lungs every 4 hours as needed for shortness of breath, wheezing or cough      albuterol (PROVENTIL) (2.5 MG/3ML) 0.083% neb solution Take 2.5 mg by nebulization 4 times daily as needed for shortness of breath, wheezing or cough      alteplase (CATHFLO ACTIVASE) 2 MG injection Inject 2  mg into the vein as needed      aspirin (ASA) 81 MG chewable tablet Take 81 mg by mouth daily      atorvastatin (LIPITOR) 40 MG tablet Take 40 mg by mouth daily      Calcium Carbonate Antacid 1177 MG CHEW Take 1 tablet by mouth 3 times daily      carvedilol (COREG) 25 MG tablet Take 25 mg by mouth 2 times daily (with meals)      cefTRIAXone (ROCEPHIN) 2 GM vial Inject 2 g into the vein every 12 hours For 28 days then discontinue      colchicine (COLCRYS) 0.6 MG tablet Take 0.6 mg by mouth 2 times daily      cyanocobalamin (VITAMIN B-12) 1000 MCG sublingual tablet Place 1 tablet under the tongue daily      DULoxetine (CYMBALTA) 60 MG capsule Take 60 mg by mouth daily      fluticasone (FLONASE) 50 MCG/ACT nasal spray Spray 1 spray into both nostrils daily as needed for rhinitis or allergies      Fluticasone-Umeclidin-Vilanterol (TRELEGY ELLIPTA) 200-62.5-25 MCG/ACT oral inhaler Inhale 1 puff into the lungs daily      furosemide (LASIX) 40 MG tablet Take 40 mg by mouth daily      guaiFENesin (MUCINEX) 600 MG 12 hr tablet Take 1,200 mg by mouth 2 times daily      hydrOXYzine deanna (VISTARIL) 25 MG capsule Take 25 mg by mouth 3 times daily as needed for itching      ipratropium - albuterol 0.5 mg/2.5 mg/3 mL (DUONEB) 0.5-2.5 (3) MG/3ML neb solution Take 1 vial by nebulization 4 times daily      levothyroxine (SYNTHROID/LEVOTHROID) 150 MCG tablet Take 150 mcg by mouth daily      lidocaine (LIDODERM) 5 % patch Place onto the skin every 24 hours To prevent lidocaine toxicity, patient should be patch free for 12 hrs daily.      loratadine (CLARITIN) 10 MG tablet Take 10 mg by mouth daily      losartan (COZAAR) 50 MG tablet Take 50 mg by mouth daily      magnesium oxide 400 MG tablet Take 400 mg by mouth daily      meclizine (ANTIVERT) 12.5 MG tablet Take 12.5 mg by mouth every morning And 12.5mg Po BID PRN      melatonin 3 MG tablet Take 3 mg by mouth nightly as needed for sleep      miconazole (MICATIN) 200 MG vaginal  suppository Place 1 suppository (200 mg) vaginally at bedtime for 3 days      multivitamin childrens (ANIMAL SHAPES) CHEW chewable tablet Take 2 tablets by mouth daily      nicotine (NICODERM CQ) 7 MG/24HR 24 hr patch Place 1 patch onto the skin daily as needed for nicotine withdrawal symptoms      ondansetron (ZOFRAN ODT) 8 MG ODT tab Take 8 mg by mouth every 8 hours as needed for nausea      oxyCODONE (ROXICODONE) 5 MG tablet Take 1-2 tablets (5-10 mg) by mouth every 6 hours as needed for severe pain 30 tablet 0    pantoprazole (PROTONIX) 40 MG EC tablet Take 40 mg by mouth 2 times daily      polyethylene glycol 3350 POWD Take 17 g by mouth daily as needed (constipation)      potassium chloride jaqui ER (KLOR-CON M20) 20 MEQ CR tablet Take 20 mEq by mouth daily      predniSONE (DELTASONE) 5 MG tablet Take 5 mg by mouth daily      propylene glycol (SYSTANE BALANCE) 0.6 % SOLN ophthalmic solution Place 1 drop into both eyes 4 times daily as needed for dry eyes      senna-docusate (SENOKOT-S/PERICOLACE) 8.6-50 MG tablet Take 2 tablets by mouth 2 times daily      simethicone (MYLICON) 125 MG chewable tablet Take 125 mg by mouth every 6 hours as needed for intestinal gas      Vancomycin HCl 1250 MG/250ML SOLN Inject 1,250 mg into the vein daily For 27 days then discontinue      Vitamin D3 (CHOLECALCIFEROL) 25 mcg (1000 units) tablet Take 1 tablet by mouth daily       Medications reviewed:  Medications reconciled to facility chart and changes were made to reflect current medications as identified as above med list. Below are the changes that were made:   Medications stopped since last EPIC medication reconciliation:   There are no discontinued medications.    Medications started since last [facility] medication reconciliation:  No orders of the defined types were placed in this encounter.        REVIEW OF SYSTEMS:  4 point ROS neg other than the symptoms noted above in the HPI.      PHYSICAL EXAM:  BP (!) 177/80   Pulse 90    "Temp 97.9  F (36.6  C)   Resp 18   Ht 1.702 m (5' 7\")   Wt 104 kg (229 lb 3.2 oz)   SpO2 95%   BMI 35.90 kg/m    Physical Exam  Cardiovascular:      Rate and Rhythm: Regular rhythm.      Heart sounds: Normal heart sounds.   Pulmonary:      Effort: Pulmonary effort is normal.      Breath sounds: Normal breath sounds.   Musculoskeletal:         General: Tenderness (right thigh) present.      Right lower leg: Edema present.   Neurological:      Mental Status: She is alert.   Psychiatric:         Mood and Affect: Mood normal.         Thought Content: Thought content normal.         ASSESSMENT / PLAN:  Pain and swelling of right lower extremity  Swelling, some tenderness in right thigh/calf. No erythema or excessive warmth. Mobility is improved, but is a smoker   - check RLE venous duplex to r/o DVT      Orders:  RLE venous duplex    Electronically signed by:  TAMMIE Cruz CNP  " no known mental health issues.

## 2024-05-24 NOTE — PATIENT PROFILE ADULT - SURGICAL SITE DRAIN
Routed to Dr Gonsales:  Please review MyhChart and advise.    Bala KNIGHT [facility]     no

## 2024-09-17 NOTE — ED ADULT TRIAGE NOTE - NS ED NOTE AC HIGH RISK COUNTRIES
Requested Prescriptions     Pending Prescriptions Disp Refills    fluconazole (DIFLUCAN) 150 mg tablet  0     LOV 8/1/24 F/U 12/31/24 Labs completed   No

## 2024-10-05 NOTE — BEHAVIORAL HEALTH ASSESSMENT NOTE - RISK ASSESSMENT
none
Low to moderate-Given patient's confusion.  Patient has not been aggressive or agitated, with no known h/o suicide attempts, currently denying any S/H I/I/P and has been compliant with treatment.

## 2025-04-07 NOTE — PROVIDER CONTACT NOTE (CRITICAL VALUE NOTIFICATION) - DATE AND TIME:
19-Jun-2018 10:00 Adult  Goal: Free from fall injury  Outcome: Progressing  Flowsheets (Taken 4/7/2025 1037)  Free From Fall Injury:   Instruct family/caregiver on patient safety   Based on caregiver fall risk screen, instruct family/caregiver to ask for assistance with transferring infant if caregiver noted to have fall risk factors     Problem: ABCDS Injury Assessment  Goal: Absence of physical injury  Outcome: Progressing  Flowsheets (Taken 4/7/2025 1037)  Absence of Physical Injury: Implement safety measures based on patient assessment     Problem: Nutrition Deficit:  Goal: Optimize nutritional status  4/7/2025 1037 by Christiane Mejia, RN  Outcome: Progressing  Flowsheets (Taken 4/7/2025 1037)  Nutrient intake appropriate for improving, restoring, or maintaining nutritional needs:   Assess nutritional status and recommend course of action   Monitor oral intake, labs, and treatment plans   Recommend appropriate diets, oral nutritional supplements, and vitamin/mineral supplements   Order, calculate, and assess calorie counts as needed   Recommend, monitor, and adjust tube feedings and TPN/PPN based on assessed needs   Provide specific nutrition education to patient or family as appropriate  4/7/2025 0927 by Desi Amaya RD  Outcome: Progressing  Flowsheets (Taken 4/7/2025 0927)  Nutrient intake appropriate for improving, restoring, or maintaining nutritional needs:   Assess nutritional status and recommend course of action   Monitor oral intake, labs, and treatment plans   Order, calculate, and assess calorie counts as needed   Recommend appropriate diets, oral nutritional supplements, and vitamin/mineral supplements   Provide specific nutrition education to patient or family as appropriate   Recommend, monitor, and adjust tube feedings and TPN/PPN based on assessed needs      19-Jun-2018 10:45

## 2025-05-12 NOTE — PROGRESS NOTE BEHAVIORAL HEALTH - BEHAVIOR
I spoke with Dr Berumen and she stated she was not doing a loading dose for this pts Cimzia.   I called [facility] back with that message. Thank you  
You just prescribed mario Cimzia, [facility] called wanting to know if you are wanting to start pt on loading dose of this medication as it was not called in with loading doses?    Please advise or send in new script to [facility] with the loading dose on the sig:      Thank you  
Cooperative
Cooperative